# Patient Record
Sex: FEMALE | Race: WHITE | NOT HISPANIC OR LATINO | Employment: OTHER | URBAN - METROPOLITAN AREA
[De-identification: names, ages, dates, MRNs, and addresses within clinical notes are randomized per-mention and may not be internally consistent; named-entity substitution may affect disease eponyms.]

---

## 2017-02-16 ENCOUNTER — ALLSCRIPTS OFFICE VISIT (OUTPATIENT)
Dept: OTHER | Facility: OTHER | Age: 42
End: 2017-02-16

## 2017-03-25 ENCOUNTER — HOSPITAL ENCOUNTER (OUTPATIENT)
Facility: HOSPITAL | Age: 42
Setting detail: OBSERVATION
Discharge: HOME | End: 2017-03-27
Attending: EMERGENCY MEDICINE | Admitting: HOSPITALIST
Payer: COMMERCIAL

## 2017-03-25 ENCOUNTER — APPOINTMENT (EMERGENCY)
Dept: CT IMAGING | Facility: HOSPITAL | Age: 42
End: 2017-03-25
Payer: COMMERCIAL

## 2017-03-25 DIAGNOSIS — T48.202A: ICD-10-CM

## 2017-03-25 DIAGNOSIS — R45.850 HOMICIDAL THOUGHTS: ICD-10-CM

## 2017-03-25 DIAGNOSIS — T50.902A SUICIDAL OVERDOSE, INITIAL ENCOUNTER (HCC): ICD-10-CM

## 2017-03-25 DIAGNOSIS — F41.9 ANXIETY: ICD-10-CM

## 2017-03-25 DIAGNOSIS — R45.851 SUICIDAL THOUGHTS: Primary | ICD-10-CM

## 2017-03-25 PROBLEM — S92.309A CLOSED FRACTURE OF TARSAL AND METATARSAL BONES: Status: ACTIVE | Noted: 2017-03-25

## 2017-03-25 PROBLEM — S92.209A CLOSED FRACTURE OF TARSAL AND METATARSAL BONES: Status: ACTIVE | Noted: 2017-03-25

## 2017-03-25 PROBLEM — G40.909 EPILEPSY (HCC): Chronic | Status: ACTIVE | Noted: 2017-03-25

## 2017-03-25 PROBLEM — R40.0 SOMNOLENCE: Status: ACTIVE | Noted: 2017-03-25

## 2017-03-25 PROBLEM — G89.4 CHRONIC PAIN ASSOCIATED WITH SIGNIFICANT PSYCHOSOCIAL DYSFUNCTION: Status: ACTIVE | Noted: 2017-03-25

## 2017-03-25 PROBLEM — Z09 TREATMENT OF HEALED FRACTURE FOLLOW-UP EXAMINATION: Status: ACTIVE | Noted: 2017-03-25

## 2017-03-25 PROBLEM — T48.201A OVERDOSE OF MUSCLE RELAXANT: Status: ACTIVE | Noted: 2017-03-25

## 2017-03-25 PROBLEM — Z86.711 HISTORY OF PULMONARY EMBOLISM: Status: ACTIVE | Noted: 2017-03-25

## 2017-03-25 PROBLEM — S92.919A CLOSED FRACTURE OF PHALANX OF FOOT: Status: ACTIVE | Noted: 2017-03-25

## 2017-03-25 PROBLEM — S22.49XA CLOSED FRACTURE OF MULTIPLE RIBS: Status: ACTIVE | Noted: 2017-03-25

## 2017-03-25 PROBLEM — F17.200 CURRENT SMOKER: Status: ACTIVE | Noted: 2017-03-25

## 2017-03-25 PROBLEM — G57.70 CAUSALGIA OF LOWER LIMB: Status: ACTIVE | Noted: 2017-03-25

## 2017-03-25 PROBLEM — G40.909 EPILEPSY (HCC): Status: ACTIVE | Noted: 2017-03-25

## 2017-03-25 LAB
ALBUMIN SERPL BCP-MCNC: 3.3 G/DL (ref 3.5–5)
ALP SERPL-CCNC: 112 U/L (ref 46–116)
ALT SERPL W P-5'-P-CCNC: 15 U/L (ref 12–78)
AMPHETAMINES SERPL QL SCN: NEGATIVE
ANION GAP SERPL CALCULATED.3IONS-SCNC: 12 MMOL/L (ref 4–13)
APAP SERPL-MCNC: <2 UG/ML (ref 10–30)
AST SERPL W P-5'-P-CCNC: 9 U/L (ref 5–45)
BARBITURATES UR QL: NEGATIVE
BASOPHILS # BLD AUTO: 0.04 THOUSANDS/ΜL (ref 0–0.1)
BASOPHILS NFR BLD AUTO: 1 % (ref 0–1)
BENZODIAZ UR QL: NEGATIVE
BILIRUB SERPL-MCNC: 0.2 MG/DL (ref 0.2–1)
BILIRUB UR QL STRIP: NEGATIVE
BUN SERPL-MCNC: 10 MG/DL (ref 5–25)
CALCIUM SERPL-MCNC: 8.6 MG/DL (ref 8.3–10.1)
CHLORIDE SERPL-SCNC: 108 MMOL/L (ref 100–108)
CLARITY UR: ABNORMAL
CO2 SERPL-SCNC: 26 MMOL/L (ref 21–32)
COCAINE UR QL: NEGATIVE
COLOR UR: YELLOW
CREAT SERPL-MCNC: 0.91 MG/DL (ref 0.6–1.3)
EOSINOPHIL # BLD AUTO: 0.13 THOUSAND/ΜL (ref 0–0.61)
EOSINOPHIL NFR BLD AUTO: 2 % (ref 0–6)
ERYTHROCYTE [DISTWIDTH] IN BLOOD BY AUTOMATED COUNT: 19.2 % (ref 11.6–15.1)
ETHANOL SERPL-MCNC: <3 MG/DL (ref 0–3)
GFR SERPL CREATININE-BSD FRML MDRD: >60 ML/MIN/1.73SQ M
GLUCOSE SERPL-MCNC: 84 MG/DL (ref 65–140)
GLUCOSE UR STRIP-MCNC: NEGATIVE MG/DL
HCT VFR BLD AUTO: 32.9 % (ref 34.8–46.1)
HGB BLD-MCNC: 9.9 G/DL (ref 11.5–15.4)
HGB UR QL STRIP.AUTO: NEGATIVE
KETONES UR STRIP-MCNC: ABNORMAL MG/DL
LEUKOCYTE ESTERASE UR QL STRIP: NEGATIVE
LYMPHOCYTES # BLD AUTO: 2.67 THOUSANDS/ΜL (ref 0.6–4.47)
LYMPHOCYTES NFR BLD AUTO: 37 % (ref 14–44)
MCH RBC QN AUTO: 21.7 PG (ref 26.8–34.3)
MCHC RBC AUTO-ENTMCNC: 30.1 G/DL (ref 31.4–37.4)
MCV RBC AUTO: 72 FL (ref 82–98)
METHADONE UR QL: NEGATIVE
MONOCYTES # BLD AUTO: 0.51 THOUSAND/ΜL (ref 0.17–1.22)
MONOCYTES NFR BLD AUTO: 7 % (ref 4–12)
NEUTROPHILS # BLD AUTO: 3.84 THOUSANDS/ΜL (ref 1.85–7.62)
NEUTS SEG NFR BLD AUTO: 53 % (ref 43–75)
NITRITE UR QL STRIP: NEGATIVE
OPIATES UR QL SCN: POSITIVE
PCP UR QL: NEGATIVE
PH UR STRIP.AUTO: 6 [PH] (ref 4.5–8)
PLATELET # BLD AUTO: 290 THOUSANDS/UL (ref 149–390)
PMV BLD AUTO: 9.9 FL (ref 8.9–12.7)
POTASSIUM SERPL-SCNC: 3.4 MMOL/L (ref 3.5–5.3)
PROT SERPL-MCNC: 6.8 G/DL (ref 6.4–8.2)
PROT UR STRIP-MCNC: NEGATIVE MG/DL
RBC # BLD AUTO: 4.57 MILLION/UL (ref 3.81–5.12)
SALICYLATES SERPL-MCNC: 4.3 MG/DL (ref 3–20)
SODIUM SERPL-SCNC: 146 MMOL/L (ref 136–145)
SP GR UR STRIP.AUTO: 1.02 (ref 1–1.03)
THC UR QL: NEGATIVE
UROBILINOGEN UR QL STRIP.AUTO: 0.2 E.U./DL
WBC # BLD AUTO: 7.19 THOUSAND/UL (ref 4.31–10.16)

## 2017-03-25 PROCEDURE — 80053 COMPREHEN METABOLIC PANEL: CPT | Performed by: PHYSICIAN ASSISTANT

## 2017-03-25 PROCEDURE — 80307 DRUG TEST PRSMV CHEM ANLYZR: CPT | Performed by: PHYSICIAN ASSISTANT

## 2017-03-25 PROCEDURE — 80320 DRUG SCREEN QUANTALCOHOLS: CPT | Performed by: PHYSICIAN ASSISTANT

## 2017-03-25 PROCEDURE — 36415 COLL VENOUS BLD VENIPUNCTURE: CPT | Performed by: PHYSICIAN ASSISTANT

## 2017-03-25 PROCEDURE — 81003 URINALYSIS AUTO W/O SCOPE: CPT | Performed by: EMERGENCY MEDICINE

## 2017-03-25 PROCEDURE — 80329 ANALGESICS NON-OPIOID 1 OR 2: CPT | Performed by: PHYSICIAN ASSISTANT

## 2017-03-25 PROCEDURE — 96361 HYDRATE IV INFUSION ADD-ON: CPT

## 2017-03-25 PROCEDURE — 70450 CT HEAD/BRAIN W/O DYE: CPT

## 2017-03-25 PROCEDURE — 96374 THER/PROPH/DIAG INJ IV PUSH: CPT

## 2017-03-25 PROCEDURE — 85025 COMPLETE CBC W/AUTO DIFF WBC: CPT | Performed by: PHYSICIAN ASSISTANT

## 2017-03-25 PROCEDURE — 99285 EMERGENCY DEPT VISIT HI MDM: CPT

## 2017-03-25 PROCEDURE — 93005 ELECTROCARDIOGRAM TRACING: CPT | Performed by: PHYSICIAN ASSISTANT

## 2017-03-25 RX ORDER — LORAZEPAM 2 MG/ML
INJECTION INTRAMUSCULAR
Status: COMPLETED
Start: 2017-03-25 | End: 2017-03-25

## 2017-03-25 RX ORDER — CARISOPRODOL 350 MG/1
350 TABLET ORAL 4 TIMES DAILY PRN
COMMUNITY
End: 2017-03-27 | Stop reason: HOSPADM

## 2017-03-25 RX ORDER — LORAZEPAM 2 MG/ML
2 INJECTION INTRAMUSCULAR ONCE
Status: COMPLETED | OUTPATIENT
Start: 2017-03-25 | End: 2017-03-25

## 2017-03-25 RX ADMIN — SODIUM CHLORIDE 500 ML: 0.9 INJECTION, SOLUTION INTRAVENOUS at 16:07

## 2017-03-25 RX ADMIN — LORAZEPAM 2 MG: 2 INJECTION, SOLUTION INTRAMUSCULAR; INTRAVENOUS at 16:27

## 2017-03-25 RX ADMIN — LORAZEPAM 2 MG: 2 INJECTION INTRAMUSCULAR at 16:27

## 2017-03-25 RX ADMIN — SODIUM CHLORIDE 500 ML: 0.9 INJECTION, SOLUTION INTRAVENOUS at 18:49

## 2017-03-26 ENCOUNTER — APPOINTMENT (OUTPATIENT)
Dept: RADIOLOGY | Facility: HOSPITAL | Age: 42
End: 2017-03-26
Payer: COMMERCIAL

## 2017-03-26 LAB
ALBUMIN SERPL BCP-MCNC: 2.4 G/DL (ref 3.5–5)
ALP SERPL-CCNC: 90 U/L (ref 46–116)
ALT SERPL W P-5'-P-CCNC: 12 U/L (ref 12–78)
AMMONIA PLAS-SCNC: 13 UMOL/L (ref 11–35)
ANION GAP SERPL CALCULATED.3IONS-SCNC: 10 MMOL/L (ref 4–13)
ANION GAP SERPL CALCULATED.3IONS-SCNC: 8 MMOL/L (ref 4–13)
AST SERPL W P-5'-P-CCNC: 7 U/L (ref 5–45)
BILIRUB SERPL-MCNC: 0.2 MG/DL (ref 0.2–1)
BUN SERPL-MCNC: 8 MG/DL (ref 5–25)
BUN SERPL-MCNC: 9 MG/DL (ref 5–25)
CALCIUM SERPL-MCNC: 7.9 MG/DL (ref 8.3–10.1)
CALCIUM SERPL-MCNC: 8 MG/DL (ref 8.3–10.1)
CHLORIDE SERPL-SCNC: 111 MMOL/L (ref 100–108)
CHLORIDE SERPL-SCNC: 111 MMOL/L (ref 100–108)
CO2 SERPL-SCNC: 24 MMOL/L (ref 21–32)
CO2 SERPL-SCNC: 25 MMOL/L (ref 21–32)
CREAT SERPL-MCNC: 0.72 MG/DL (ref 0.6–1.3)
CREAT SERPL-MCNC: 0.84 MG/DL (ref 0.6–1.3)
ERYTHROCYTE [DISTWIDTH] IN BLOOD BY AUTOMATED COUNT: 19.2 % (ref 11.6–15.1)
FERRITIN SERPL-MCNC: 51 NG/ML (ref 8–388)
GFR SERPL CREATININE-BSD FRML MDRD: >60 ML/MIN/1.73SQ M
GFR SERPL CREATININE-BSD FRML MDRD: >60 ML/MIN/1.73SQ M
GLUCOSE SERPL-MCNC: 86 MG/DL (ref 65–140)
GLUCOSE SERPL-MCNC: 94 MG/DL (ref 65–140)
HCG SERPL QL: NEGATIVE
HCT VFR BLD AUTO: 28.6 % (ref 34.8–46.1)
HCT VFR BLD AUTO: 31 % (ref 34.8–46.1)
HGB BLD-MCNC: 8.7 G/DL (ref 11.5–15.4)
HGB BLD-MCNC: 9 G/DL (ref 11.5–15.4)
IRON SERPL-MCNC: 28 UG/DL (ref 50–170)
MAGNESIUM SERPL-MCNC: 2.3 MG/DL (ref 1.6–2.6)
MCH RBC QN AUTO: 22 PG (ref 26.8–34.3)
MCHC RBC AUTO-ENTMCNC: 30.4 G/DL (ref 31.4–37.4)
MCV RBC AUTO: 72 FL (ref 82–98)
PLATELET # BLD AUTO: 243 THOUSANDS/UL (ref 149–390)
PMV BLD AUTO: 9.7 FL (ref 8.9–12.7)
POTASSIUM SERPL-SCNC: 3.2 MMOL/L (ref 3.5–5.3)
POTASSIUM SERPL-SCNC: 3.4 MMOL/L (ref 3.5–5.3)
PROT SERPL-MCNC: 5.7 G/DL (ref 6.4–8.2)
RBC # BLD AUTO: 3.95 MILLION/UL (ref 3.81–5.12)
RETICS # AUTO: NORMAL 10*3/UL (ref 14097–95744)
RETICS # CALC: 1.01 % (ref 0.37–1.87)
SODIUM SERPL-SCNC: 144 MMOL/L (ref 136–145)
SODIUM SERPL-SCNC: 145 MMOL/L (ref 136–145)
TIBC SERPL-MCNC: 413 UG/DL (ref 250–450)
WBC # BLD AUTO: 5.38 THOUSAND/UL (ref 4.31–10.16)

## 2017-03-26 PROCEDURE — 80048 BASIC METABOLIC PNL TOTAL CA: CPT | Performed by: NURSE PRACTITIONER

## 2017-03-26 PROCEDURE — 80053 COMPREHEN METABOLIC PANEL: CPT | Performed by: NURSE PRACTITIONER

## 2017-03-26 PROCEDURE — 83735 ASSAY OF MAGNESIUM: CPT | Performed by: NURSE PRACTITIONER

## 2017-03-26 PROCEDURE — 71010 HB CHEST X-RAY 1 VIEW FRONTAL (PORTABLE): CPT

## 2017-03-26 PROCEDURE — 84703 CHORIONIC GONADOTROPIN ASSAY: CPT | Performed by: HOSPITALIST

## 2017-03-26 PROCEDURE — 82728 ASSAY OF FERRITIN: CPT | Performed by: HOSPITALIST

## 2017-03-26 PROCEDURE — 85045 AUTOMATED RETICULOCYTE COUNT: CPT | Performed by: HOSPITALIST

## 2017-03-26 PROCEDURE — 93005 ELECTROCARDIOGRAM TRACING: CPT | Performed by: NURSE PRACTITIONER

## 2017-03-26 PROCEDURE — 83550 IRON BINDING TEST: CPT | Performed by: HOSPITALIST

## 2017-03-26 PROCEDURE — 82140 ASSAY OF AMMONIA: CPT | Performed by: HOSPITALIST

## 2017-03-26 PROCEDURE — 85014 HEMATOCRIT: CPT | Performed by: HOSPITALIST

## 2017-03-26 PROCEDURE — 85027 COMPLETE CBC AUTOMATED: CPT | Performed by: NURSE PRACTITIONER

## 2017-03-26 PROCEDURE — 85018 HEMOGLOBIN: CPT | Performed by: HOSPITALIST

## 2017-03-26 PROCEDURE — 83540 ASSAY OF IRON: CPT | Performed by: HOSPITALIST

## 2017-03-26 RX ORDER — POTASSIUM CHLORIDE 14.9 MG/ML
20 INJECTION INTRAVENOUS
Status: DISCONTINUED | OUTPATIENT
Start: 2017-03-26 | End: 2017-03-26

## 2017-03-26 RX ORDER — ZOLPIDEM TARTRATE 5 MG/1
5 TABLET ORAL
Status: DISCONTINUED | OUTPATIENT
Start: 2017-03-26 | End: 2017-03-27 | Stop reason: HOSPADM

## 2017-03-26 RX ORDER — OXYCODONE HYDROCHLORIDE AND ACETAMINOPHEN 5; 325 MG/1; MG/1
1 TABLET ORAL EVERY 6 HOURS PRN
COMMUNITY
End: 2017-06-09

## 2017-03-26 RX ORDER — MORPHINE SULFATE 15 MG/1
15 TABLET, FILM COATED, EXTENDED RELEASE ORAL 2 TIMES DAILY
Status: DISCONTINUED | OUTPATIENT
Start: 2017-03-26 | End: 2017-03-26

## 2017-03-26 RX ORDER — MORPHINE SULFATE 15 MG/1
15 TABLET, FILM COATED, EXTENDED RELEASE ORAL EVERY 12 HOURS SCHEDULED
Status: DISCONTINUED | OUTPATIENT
Start: 2017-03-27 | End: 2017-03-27 | Stop reason: HOSPADM

## 2017-03-26 RX ORDER — OXYCODONE HYDROCHLORIDE AND ACETAMINOPHEN 5; 325 MG/1; MG/1
2 TABLET ORAL EVERY 4 HOURS PRN
Status: DISCONTINUED | OUTPATIENT
Start: 2017-03-26 | End: 2017-03-27 | Stop reason: HOSPADM

## 2017-03-26 RX ORDER — BUSPIRONE HYDROCHLORIDE 10 MG/1
10 TABLET ORAL 2 TIMES DAILY
Status: DISCONTINUED | OUTPATIENT
Start: 2017-03-26 | End: 2017-03-27 | Stop reason: HOSPADM

## 2017-03-26 RX ORDER — NICOTINE 21 MG/24HR
1 PATCH, TRANSDERMAL 24 HOURS TRANSDERMAL DAILY
Status: DISCONTINUED | OUTPATIENT
Start: 2017-03-26 | End: 2017-03-27 | Stop reason: HOSPADM

## 2017-03-26 RX ORDER — DULOXETIN HYDROCHLORIDE 30 MG/1
30 CAPSULE, DELAYED RELEASE ORAL DAILY
Status: DISCONTINUED | OUTPATIENT
Start: 2017-03-26 | End: 2017-03-27

## 2017-03-26 RX ORDER — TEMAZEPAM 15 MG/1
30 CAPSULE ORAL
Status: DISCONTINUED | OUTPATIENT
Start: 2017-03-26 | End: 2017-03-27 | Stop reason: HOSPADM

## 2017-03-26 RX ORDER — ACETAMINOPHEN 325 MG/1
650 TABLET ORAL EVERY 6 HOURS PRN
Status: DISCONTINUED | OUTPATIENT
Start: 2017-03-26 | End: 2017-03-27 | Stop reason: HOSPADM

## 2017-03-26 RX ORDER — HALOPERIDOL 5 MG/ML
5 INJECTION INTRAMUSCULAR ONCE
Status: COMPLETED | OUTPATIENT
Start: 2017-03-26 | End: 2017-03-26

## 2017-03-26 RX ORDER — OXYCODONE HYDROCHLORIDE AND ACETAMINOPHEN 5; 325 MG/1; MG/1
1 TABLET ORAL EVERY 6 HOURS PRN
Status: DISCONTINUED | OUTPATIENT
Start: 2017-03-26 | End: 2017-03-26

## 2017-03-26 RX ORDER — GABAPENTIN 400 MG/1
800 CAPSULE ORAL 3 TIMES DAILY
Status: DISCONTINUED | OUTPATIENT
Start: 2017-03-26 | End: 2017-03-26

## 2017-03-26 RX ORDER — SENNOSIDES 8.6 MG
1 TABLET ORAL DAILY
Status: DISCONTINUED | OUTPATIENT
Start: 2017-03-26 | End: 2017-03-26

## 2017-03-26 RX ORDER — ROPINIROLE 2 MG/1
4 TABLET, FILM COATED ORAL
Status: DISCONTINUED | OUTPATIENT
Start: 2017-03-26 | End: 2017-03-26

## 2017-03-26 RX ORDER — MORPHINE SULFATE 15 MG/1
15 TABLET, FILM COATED, EXTENDED RELEASE ORAL ONCE
Status: COMPLETED | OUTPATIENT
Start: 2017-03-26 | End: 2017-03-26

## 2017-03-26 RX ORDER — ROPINIROLE 2 MG/1
8 TABLET, FILM COATED ORAL
Status: DISCONTINUED | OUTPATIENT
Start: 2017-03-26 | End: 2017-03-27 | Stop reason: HOSPADM

## 2017-03-26 RX ORDER — TEMAZEPAM 15 MG/1
30 CAPSULE ORAL
Status: DISCONTINUED | OUTPATIENT
Start: 2017-03-26 | End: 2017-03-26

## 2017-03-26 RX ORDER — NALOXONE HYDROCHLORIDE 1 MG/ML
0.4 INJECTION INTRAMUSCULAR; INTRAVENOUS; SUBCUTANEOUS ONCE
Status: DISCONTINUED | OUTPATIENT
Start: 2017-03-26 | End: 2017-03-26

## 2017-03-26 RX ORDER — DEXTROSE, SODIUM CHLORIDE, AND POTASSIUM CHLORIDE 5; .9; .15 G/100ML; G/100ML; G/100ML
100 INJECTION INTRAVENOUS CONTINUOUS
Status: DISCONTINUED | OUTPATIENT
Start: 2017-03-26 | End: 2017-03-26

## 2017-03-26 RX ORDER — MORPHINE SULFATE 15 MG/1
15 TABLET ORAL
Status: ON HOLD | COMMUNITY
End: 2017-03-26 | Stop reason: SDUPTHER

## 2017-03-26 RX ORDER — LORAZEPAM 2 MG/ML
2 INJECTION INTRAMUSCULAR EVERY 4 HOURS PRN
Status: DISCONTINUED | OUTPATIENT
Start: 2017-03-26 | End: 2017-03-26

## 2017-03-26 RX ORDER — OXYCODONE HYDROCHLORIDE AND ACETAMINOPHEN 5; 325 MG/1; MG/1
1 TABLET ORAL EVERY 4 HOURS PRN
Status: DISCONTINUED | OUTPATIENT
Start: 2017-03-26 | End: 2017-03-26

## 2017-03-26 RX ORDER — DOCUSATE SODIUM 100 MG/1
100 CAPSULE, LIQUID FILLED ORAL 2 TIMES DAILY
Status: DISCONTINUED | OUTPATIENT
Start: 2017-03-26 | End: 2017-03-26

## 2017-03-26 RX ORDER — POTASSIUM CHLORIDE 20 MEQ/1
40 TABLET, EXTENDED RELEASE ORAL ONCE
Status: COMPLETED | OUTPATIENT
Start: 2017-03-26 | End: 2017-03-26

## 2017-03-26 RX ORDER — HYDROXYZINE HYDROCHLORIDE 25 MG/1
50 TABLET, FILM COATED ORAL
Status: DISCONTINUED | OUTPATIENT
Start: 2017-03-26 | End: 2017-03-27 | Stop reason: HOSPADM

## 2017-03-26 RX ORDER — OXYCODONE HYDROCHLORIDE AND ACETAMINOPHEN 5; 325 MG/1; MG/1
1 TABLET ORAL ONCE
Status: COMPLETED | OUTPATIENT
Start: 2017-03-26 | End: 2017-03-26

## 2017-03-26 RX ORDER — MORPHINE SULFATE 15 MG/1
15 TABLET, FILM COATED, EXTENDED RELEASE ORAL 2 TIMES DAILY
COMMUNITY
End: 2017-06-09

## 2017-03-26 RX ORDER — LEVETIRACETAM 500 MG/1
1 TABLET ORAL 2 TIMES DAILY
COMMUNITY
Start: 2016-09-30 | End: 2017-06-09

## 2017-03-26 RX ORDER — GABAPENTIN 300 MG/1
900 CAPSULE ORAL 3 TIMES DAILY
Status: DISCONTINUED | OUTPATIENT
Start: 2017-03-26 | End: 2017-03-27 | Stop reason: HOSPADM

## 2017-03-26 RX ADMIN — MORPHINE SULFATE 15 MG: 15 TABLET, EXTENDED RELEASE ORAL at 01:17

## 2017-03-26 RX ADMIN — POTASSIUM CHLORIDE 40 MEQ: 1500 TABLET, EXTENDED RELEASE ORAL at 14:58

## 2017-03-26 RX ADMIN — MORPHINE SULFATE 15 MG: 15 TABLET, EXTENDED RELEASE ORAL at 20:23

## 2017-03-26 RX ADMIN — LORAZEPAM 2 MG: 2 INJECTION, SOLUTION INTRAMUSCULAR; INTRAVENOUS at 01:15

## 2017-03-26 RX ADMIN — GABAPENTIN 800 MG: 400 CAPSULE ORAL at 01:17

## 2017-03-26 RX ADMIN — OXYCODONE HYDROCHLORIDE AND ACETAMINOPHEN 1 TABLET: 5; 325 TABLET ORAL at 01:15

## 2017-03-26 RX ADMIN — ENOXAPARIN SODIUM 40 MG: 40 INJECTION SUBCUTANEOUS at 09:16

## 2017-03-26 RX ADMIN — NICOTINE 1 PATCH: 21 PATCH, EXTENDED RELEASE TRANSDERMAL at 09:15

## 2017-03-26 RX ADMIN — HALOPERIDOL LACTATE 5 MG: 5 INJECTION, SOLUTION INTRAMUSCULAR at 02:09

## 2017-03-26 RX ADMIN — OXYCODONE HYDROCHLORIDE AND ACETAMINOPHEN 1 TABLET: 5; 325 TABLET ORAL at 20:23

## 2017-03-27 VITALS
RESPIRATION RATE: 18 BRPM | TEMPERATURE: 97.4 F | OXYGEN SATURATION: 100 % | HEIGHT: 65 IN | HEART RATE: 76 BPM | BODY MASS INDEX: 36.29 KG/M2 | WEIGHT: 217.81 LBS | DIASTOLIC BLOOD PRESSURE: 53 MMHG | SYSTOLIC BLOOD PRESSURE: 101 MMHG

## 2017-03-27 PROBLEM — Z86.711 HISTORY OF PULMONARY EMBOLISM: Status: RESOLVED | Noted: 2017-03-25 | Resolved: 2017-03-27

## 2017-03-27 PROBLEM — R40.0 SOMNOLENCE: Status: RESOLVED | Noted: 2017-03-25 | Resolved: 2017-03-27

## 2017-03-27 PROBLEM — T48.201A OVERDOSE OF MUSCLE RELAXANT: Status: RESOLVED | Noted: 2017-03-25 | Resolved: 2017-03-27

## 2017-03-27 PROBLEM — S92.209A CLOSED FRACTURE OF TARSAL AND METATARSAL BONES: Status: RESOLVED | Noted: 2017-03-25 | Resolved: 2017-03-27

## 2017-03-27 PROBLEM — Z09 TREATMENT OF HEALED FRACTURE FOLLOW-UP EXAMINATION: Status: RESOLVED | Noted: 2017-03-25 | Resolved: 2017-03-27

## 2017-03-27 PROBLEM — S92.309A CLOSED FRACTURE OF TARSAL AND METATARSAL BONES: Status: RESOLVED | Noted: 2017-03-25 | Resolved: 2017-03-27

## 2017-03-27 PROBLEM — D64.9 ANEMIA: Status: ACTIVE | Noted: 2017-03-27

## 2017-03-27 PROBLEM — S92.919A CLOSED FRACTURE OF PHALANX OF FOOT: Status: RESOLVED | Noted: 2017-03-25 | Resolved: 2017-03-27

## 2017-03-27 PROBLEM — S22.49XA CLOSED FRACTURE OF MULTIPLE RIBS: Status: RESOLVED | Noted: 2017-03-25 | Resolved: 2017-03-27

## 2017-03-27 PROBLEM — G57.70 CAUSALGIA OF LOWER LIMB: Status: RESOLVED | Noted: 2017-03-25 | Resolved: 2017-03-27

## 2017-03-27 LAB
ALBUMIN SERPL BCP-MCNC: 2.5 G/DL (ref 3.5–5)
ALP SERPL-CCNC: 100 U/L (ref 46–116)
ALT SERPL W P-5'-P-CCNC: 12 U/L (ref 12–78)
ANION GAP SERPL CALCULATED.3IONS-SCNC: 9 MMOL/L (ref 4–13)
AST SERPL W P-5'-P-CCNC: 10 U/L (ref 5–45)
ATRIAL RATE: 106 BPM
ATRIAL RATE: 67 BPM
BILIRUB SERPL-MCNC: 0.2 MG/DL (ref 0.2–1)
BUN SERPL-MCNC: 10 MG/DL (ref 5–25)
CALCIUM SERPL-MCNC: 8.2 MG/DL (ref 8.3–10.1)
CHLORIDE SERPL-SCNC: 110 MMOL/L (ref 100–108)
CO2 SERPL-SCNC: 26 MMOL/L (ref 21–32)
CREAT SERPL-MCNC: 0.76 MG/DL (ref 0.6–1.3)
ERYTHROCYTE [DISTWIDTH] IN BLOOD BY AUTOMATED COUNT: 19.4 % (ref 11.6–15.1)
GFR SERPL CREATININE-BSD FRML MDRD: >60 ML/MIN/1.73SQ M
GLUCOSE P FAST SERPL-MCNC: 81 MG/DL (ref 65–99)
GLUCOSE SERPL-MCNC: 81 MG/DL (ref 65–140)
HCT VFR BLD AUTO: 30.7 % (ref 34.8–46.1)
HGB BLD-MCNC: 9 G/DL (ref 11.5–15.4)
MCH RBC QN AUTO: 21.4 PG (ref 26.8–34.3)
MCHC RBC AUTO-ENTMCNC: 29.3 G/DL (ref 31.4–37.4)
MCV RBC AUTO: 73 FL (ref 82–98)
P AXIS: 43 DEGREES
P AXIS: 56 DEGREES
PLATELET # BLD AUTO: 241 THOUSANDS/UL (ref 149–390)
PMV BLD AUTO: 10 FL (ref 8.9–12.7)
POTASSIUM SERPL-SCNC: 4 MMOL/L (ref 3.5–5.3)
PR INTERVAL: 148 MS
PR INTERVAL: 166 MS
PROT SERPL-MCNC: 6.1 G/DL (ref 6.4–8.2)
QRS AXIS: 49 DEGREES
QRS AXIS: 65 DEGREES
QRSD INTERVAL: 84 MS
QRSD INTERVAL: 86 MS
QT INTERVAL: 352 MS
QT INTERVAL: 410 MS
QTC INTERVAL: 433 MS
QTC INTERVAL: 467 MS
RBC # BLD AUTO: 4.21 MILLION/UL (ref 3.81–5.12)
SODIUM SERPL-SCNC: 145 MMOL/L (ref 136–145)
T WAVE AXIS: 35 DEGREES
T WAVE AXIS: 58 DEGREES
VENTRICULAR RATE: 106 BPM
VENTRICULAR RATE: 67 BPM
WBC # BLD AUTO: 5.31 THOUSAND/UL (ref 4.31–10.16)

## 2017-03-27 PROCEDURE — 80053 COMPREHEN METABOLIC PANEL: CPT | Performed by: HOSPITALIST

## 2017-03-27 PROCEDURE — 85027 COMPLETE CBC AUTOMATED: CPT | Performed by: HOSPITALIST

## 2017-03-27 RX ORDER — VENLAFAXINE HYDROCHLORIDE 37.5 MG/1
37.5 CAPSULE, EXTENDED RELEASE ORAL DAILY
Qty: 30 CAPSULE | Refills: 0 | Status: SHIPPED | OUTPATIENT
Start: 2017-03-27 | End: 2017-06-09

## 2017-03-27 RX ORDER — VENLAFAXINE HYDROCHLORIDE 37.5 MG/1
37.5 CAPSULE, EXTENDED RELEASE ORAL DAILY
Status: DISCONTINUED | OUTPATIENT
Start: 2017-03-27 | End: 2017-03-27 | Stop reason: HOSPADM

## 2017-03-27 RX ADMIN — OXYCODONE HYDROCHLORIDE AND ACETAMINOPHEN 2 TABLET: 5; 325 TABLET ORAL at 10:04

## 2017-03-27 RX ADMIN — HYDROXYZINE HYDROCHLORIDE 50 MG: 25 TABLET, FILM COATED ORAL at 00:06

## 2017-03-27 RX ADMIN — DULOXETINE HYDROCHLORIDE 30 MG: 30 CAPSULE, DELAYED RELEASE ORAL at 09:51

## 2017-03-27 RX ADMIN — GABAPENTIN 900 MG: 300 CAPSULE ORAL at 09:50

## 2017-03-27 RX ADMIN — ZOLPIDEM TARTRATE 5 MG: 5 TABLET, FILM COATED ORAL at 00:06

## 2017-03-27 RX ADMIN — OXYCODONE HYDROCHLORIDE AND ACETAMINOPHEN 2 TABLET: 5; 325 TABLET ORAL at 00:49

## 2017-03-27 RX ADMIN — DULOXETINE HYDROCHLORIDE 30 MG: 30 CAPSULE, DELAYED RELEASE ORAL at 00:06

## 2017-03-27 RX ADMIN — NICOTINE 1 PATCH: 21 PATCH, EXTENDED RELEASE TRANSDERMAL at 09:52

## 2017-03-27 RX ADMIN — BUSPIRONE HYDROCHLORIDE 10 MG: 10 TABLET ORAL at 09:50

## 2017-03-27 RX ADMIN — RIVAROXABAN 20 MG: 20 TABLET, FILM COATED ORAL at 09:50

## 2017-03-27 RX ADMIN — TEMAZEPAM 30 MG: 15 CAPSULE ORAL at 00:06

## 2017-03-27 RX ADMIN — VENLAFAXINE HYDROCHLORIDE 37.5 MG: 37.5 CAPSULE, EXTENDED RELEASE ORAL at 14:33

## 2017-03-27 RX ADMIN — GABAPENTIN 900 MG: 300 CAPSULE ORAL at 00:06

## 2017-03-27 RX ADMIN — BUSPIRONE HYDROCHLORIDE 10 MG: 10 TABLET ORAL at 01:28

## 2017-03-27 RX ADMIN — MORPHINE SULFATE 15 MG: 15 TABLET, EXTENDED RELEASE ORAL at 09:50

## 2017-04-03 ENCOUNTER — HOSPITAL ENCOUNTER (EMERGENCY)
Facility: HOSPITAL | Age: 42
Discharge: HOME | End: 2017-04-03
Attending: EMERGENCY MEDICINE | Admitting: EMERGENCY MEDICINE
Payer: COMMERCIAL

## 2017-04-03 ENCOUNTER — APPOINTMENT (EMERGENCY)
Dept: CT IMAGING | Facility: HOSPITAL | Age: 42
End: 2017-04-03
Payer: COMMERCIAL

## 2017-04-03 VITALS
SYSTOLIC BLOOD PRESSURE: 118 MMHG | HEIGHT: 65 IN | RESPIRATION RATE: 18 BRPM | BODY MASS INDEX: 33.32 KG/M2 | HEART RATE: 69 BPM | DIASTOLIC BLOOD PRESSURE: 75 MMHG | OXYGEN SATURATION: 100 % | WEIGHT: 200 LBS

## 2017-04-03 DIAGNOSIS — R11.10 VOMITING: ICD-10-CM

## 2017-04-03 DIAGNOSIS — R19.7 DIARRHEA: ICD-10-CM

## 2017-04-03 DIAGNOSIS — R10.9 ABDOMINAL PAIN: Primary | ICD-10-CM

## 2017-04-03 LAB
ALBUMIN SERPL BCP-MCNC: 3.3 G/DL (ref 3.5–5)
ALP SERPL-CCNC: 140 U/L (ref 46–116)
ALT SERPL W P-5'-P-CCNC: 18 U/L (ref 12–78)
ANION GAP SERPL CALCULATED.3IONS-SCNC: 11 MMOL/L (ref 4–13)
APTT PPP: 26 SECONDS (ref 24–36)
AST SERPL W P-5'-P-CCNC: 9 U/L (ref 5–45)
BASOPHILS # BLD AUTO: 0.04 THOUSANDS/ΜL (ref 0–0.1)
BASOPHILS NFR BLD AUTO: 1 % (ref 0–1)
BILIRUB SERPL-MCNC: 0.2 MG/DL (ref 0.2–1)
BUN SERPL-MCNC: 6 MG/DL (ref 5–25)
CALCIUM SERPL-MCNC: 9 MG/DL (ref 8.3–10.1)
CHLORIDE SERPL-SCNC: 105 MMOL/L (ref 100–108)
CO2 SERPL-SCNC: 26 MMOL/L (ref 21–32)
CREAT SERPL-MCNC: 0.84 MG/DL (ref 0.6–1.3)
EOSINOPHIL # BLD AUTO: 0.18 THOUSAND/ΜL (ref 0–0.61)
EOSINOPHIL NFR BLD AUTO: 2 % (ref 0–6)
ERYTHROCYTE [DISTWIDTH] IN BLOOD BY AUTOMATED COUNT: 21 % (ref 11.6–15.1)
GFR SERPL CREATININE-BSD FRML MDRD: >60 ML/MIN/1.73SQ M
GLUCOSE SERPL-MCNC: 105 MG/DL (ref 65–140)
HCT VFR BLD AUTO: 36 % (ref 34.8–46.1)
HGB BLD-MCNC: 10.7 G/DL (ref 11.5–15.4)
INR PPP: 0.97 (ref 0.86–1.16)
LIPASE SERPL-CCNC: 154 U/L (ref 73–393)
LYMPHOCYTES # BLD AUTO: 2.16 THOUSANDS/ΜL (ref 0.6–4.47)
LYMPHOCYTES NFR BLD AUTO: 25 % (ref 14–44)
MCH RBC QN AUTO: 21.7 PG (ref 26.8–34.3)
MCHC RBC AUTO-ENTMCNC: 29.7 G/DL (ref 31.4–37.4)
MCV RBC AUTO: 73 FL (ref 82–98)
MONOCYTES # BLD AUTO: 0.57 THOUSAND/ΜL (ref 0.17–1.22)
MONOCYTES NFR BLD AUTO: 7 % (ref 4–12)
NEUTROPHILS # BLD AUTO: 5.64 THOUSANDS/ΜL (ref 1.85–7.62)
NEUTS SEG NFR BLD AUTO: 65 % (ref 43–75)
PLATELET # BLD AUTO: 366 THOUSANDS/UL (ref 149–390)
PMV BLD AUTO: 10 FL (ref 8.9–12.7)
POTASSIUM SERPL-SCNC: 3.5 MMOL/L (ref 3.5–5.3)
PROT SERPL-MCNC: 7.6 G/DL (ref 6.4–8.2)
PROTHROMBIN TIME: 12.8 SECONDS (ref 12–14.3)
RBC # BLD AUTO: 4.94 MILLION/UL (ref 3.81–5.12)
SODIUM SERPL-SCNC: 142 MMOL/L (ref 136–145)
WBC # BLD AUTO: 8.59 THOUSAND/UL (ref 4.31–10.16)

## 2017-04-03 PROCEDURE — 80053 COMPREHEN METABOLIC PANEL: CPT | Performed by: EMERGENCY MEDICINE

## 2017-04-03 PROCEDURE — 96361 HYDRATE IV INFUSION ADD-ON: CPT

## 2017-04-03 PROCEDURE — 85610 PROTHROMBIN TIME: CPT | Performed by: EMERGENCY MEDICINE

## 2017-04-03 PROCEDURE — 83690 ASSAY OF LIPASE: CPT | Performed by: EMERGENCY MEDICINE

## 2017-04-03 PROCEDURE — 85025 COMPLETE CBC W/AUTO DIFF WBC: CPT | Performed by: EMERGENCY MEDICINE

## 2017-04-03 PROCEDURE — 36415 COLL VENOUS BLD VENIPUNCTURE: CPT | Performed by: EMERGENCY MEDICINE

## 2017-04-03 PROCEDURE — 85730 THROMBOPLASTIN TIME PARTIAL: CPT | Performed by: EMERGENCY MEDICINE

## 2017-04-03 PROCEDURE — 96375 TX/PRO/DX INJ NEW DRUG ADDON: CPT

## 2017-04-03 PROCEDURE — 99284 EMERGENCY DEPT VISIT MOD MDM: CPT

## 2017-04-03 PROCEDURE — 74176 CT ABD & PELVIS W/O CONTRAST: CPT

## 2017-04-03 PROCEDURE — 96374 THER/PROPH/DIAG INJ IV PUSH: CPT

## 2017-04-03 RX ORDER — PROMETHAZINE HYDROCHLORIDE 25 MG/ML
12.5 INJECTION, SOLUTION INTRAMUSCULAR; INTRAVENOUS ONCE
Status: COMPLETED | OUTPATIENT
Start: 2017-04-03 | End: 2017-04-03

## 2017-04-03 RX ORDER — ONDANSETRON 2 MG/ML
4 INJECTION INTRAMUSCULAR; INTRAVENOUS ONCE
Status: COMPLETED | OUTPATIENT
Start: 2017-04-03 | End: 2017-04-03

## 2017-04-03 RX ADMIN — ONDANSETRON 4 MG: 2 INJECTION INTRAMUSCULAR; INTRAVENOUS at 02:37

## 2017-04-03 RX ADMIN — PROMETHAZINE HYDROCHLORIDE 12.5 MG: 25 INJECTION INTRAMUSCULAR; INTRAVENOUS at 03:17

## 2017-04-03 RX ADMIN — SODIUM CHLORIDE 1000 ML: 0.9 INJECTION, SOLUTION INTRAVENOUS at 02:38

## 2017-05-18 ENCOUNTER — ALLSCRIPTS OFFICE VISIT (OUTPATIENT)
Dept: OTHER | Facility: OTHER | Age: 42
End: 2017-05-18

## 2017-06-09 ENCOUNTER — HOSPITAL ENCOUNTER (EMERGENCY)
Facility: HOSPITAL | Age: 42
Discharge: HOME | End: 2017-06-10
Admitting: EMERGENCY MEDICINE
Payer: COMMERCIAL

## 2017-06-09 DIAGNOSIS — K02.9 INFECTED DENTAL CARIES: Primary | ICD-10-CM

## 2017-06-09 DIAGNOSIS — K04.7 INFECTED DENTAL CARIES: Primary | ICD-10-CM

## 2017-06-09 RX ORDER — BACLOFEN 20 MG/1
20 TABLET ORAL 2 TIMES DAILY
COMMUNITY

## 2017-06-09 RX ORDER — BUSPIRONE HYDROCHLORIDE 15 MG/1
15 TABLET ORAL 3 TIMES DAILY
COMMUNITY

## 2017-06-10 VITALS
BODY MASS INDEX: 36.65 KG/M2 | WEIGHT: 220 LBS | RESPIRATION RATE: 20 BRPM | OXYGEN SATURATION: 99 % | TEMPERATURE: 98.9 F | SYSTOLIC BLOOD PRESSURE: 107 MMHG | HEIGHT: 65 IN | HEART RATE: 66 BPM | DIASTOLIC BLOOD PRESSURE: 58 MMHG

## 2017-06-10 PROBLEM — K02.9 INFECTED DENTAL CARIES: Status: ACTIVE | Noted: 2017-06-10

## 2017-06-10 PROBLEM — K04.7 INFECTED DENTAL CARIES: Status: ACTIVE | Noted: 2017-06-10

## 2017-06-10 PROCEDURE — 99282 EMERGENCY DEPT VISIT SF MDM: CPT

## 2017-06-10 RX ORDER — PENICILLIN V POTASSIUM 500 MG/1
500 TABLET ORAL 4 TIMES DAILY
Qty: 40 TABLET | Refills: 0 | Status: SHIPPED | OUTPATIENT
Start: 2017-06-10 | End: 2017-06-15

## 2017-06-10 RX ORDER — OXYCODONE HYDROCHLORIDE AND ACETAMINOPHEN 5; 325 MG/1; MG/1
1 TABLET ORAL ONCE
Status: COMPLETED | OUTPATIENT
Start: 2017-06-10 | End: 2017-06-10

## 2017-06-10 RX ORDER — OXYCODONE HYDROCHLORIDE AND ACETAMINOPHEN 5; 325 MG/1; MG/1
1 TABLET ORAL EVERY 4 HOURS PRN
Qty: 12 TABLET | Refills: 0 | Status: SHIPPED | OUTPATIENT
Start: 2017-06-10 | End: 2017-06-15

## 2017-06-10 RX ORDER — PENICILLIN V POTASSIUM 250 MG/1
500 TABLET ORAL ONCE
Status: COMPLETED | OUTPATIENT
Start: 2017-06-10 | End: 2017-06-10

## 2017-06-10 RX ADMIN — PENICILLIN V POTASIUM 500 MG: 250 TABLET ORAL at 00:40

## 2017-06-10 RX ADMIN — OXYCODONE HYDROCHLORIDE AND ACETAMINOPHEN 1 TABLET: 5; 325 TABLET ORAL at 00:39

## 2017-06-15 ENCOUNTER — HOSPITAL ENCOUNTER (EMERGENCY)
Facility: HOSPITAL | Age: 42
Discharge: HOME | End: 2017-06-15
Admitting: EMERGENCY MEDICINE
Payer: COMMERCIAL

## 2017-06-15 VITALS
WEIGHT: 217 LBS | TEMPERATURE: 98.6 F | HEIGHT: 65 IN | HEART RATE: 67 BPM | BODY MASS INDEX: 36.15 KG/M2 | DIASTOLIC BLOOD PRESSURE: 66 MMHG | SYSTOLIC BLOOD PRESSURE: 115 MMHG | RESPIRATION RATE: 20 BRPM | OXYGEN SATURATION: 99 %

## 2017-06-15 DIAGNOSIS — K08.89 PAIN, DENTAL: Primary | ICD-10-CM

## 2017-06-15 PROCEDURE — 99283 EMERGENCY DEPT VISIT LOW MDM: CPT

## 2017-06-15 RX ORDER — MIRTAZAPINE 30 MG/1
8 TABLET, FILM COATED ORAL
COMMUNITY
End: 2018-05-29 | Stop reason: ALTCHOICE

## 2017-06-15 RX ORDER — HYDROCODONE BITARTRATE AND ACETAMINOPHEN 5; 325 MG/1; MG/1
1 TABLET ORAL ONCE
Status: COMPLETED | OUTPATIENT
Start: 2017-06-15 | End: 2017-06-15

## 2017-06-15 RX ADMIN — HYDROCODONE BITARTRATE AND ACETAMINOPHEN 1 TABLET: 5; 325 TABLET ORAL at 20:07

## 2018-01-14 VITALS
SYSTOLIC BLOOD PRESSURE: 138 MMHG | HEART RATE: 88 BPM | RESPIRATION RATE: 16 BRPM | WEIGHT: 223.13 LBS | OXYGEN SATURATION: 98 % | BODY MASS INDEX: 37.18 KG/M2 | HEIGHT: 65 IN | DIASTOLIC BLOOD PRESSURE: 80 MMHG

## 2018-01-15 VITALS
SYSTOLIC BLOOD PRESSURE: 128 MMHG | HEART RATE: 88 BPM | WEIGHT: 216 LBS | DIASTOLIC BLOOD PRESSURE: 68 MMHG | RESPIRATION RATE: 16 BRPM | BODY MASS INDEX: 35.99 KG/M2 | HEIGHT: 65 IN

## 2018-01-15 NOTE — RESULT NOTES
Verified Results  * MRI BRAIN SEIZURE WO AND W CONTRAST [id number] 11:42AM Shan Ivan Order Number: [id number]   Performing Comments: 48 yo woman with history of epilepsy and prior CT scan reportedly showing a right basal ganglia hypodenisty  Please do MRI brain to evaluate for source of seizure or prior infarction    - Patient Instructions: To schedule this    appointment, please contact Central Scheduling at [phone]  Test Name Result Flag Reference   MRI BRAIN SEIZURE WO AND W CONTRAST (Report)     This is a summary report  The complete report is available in the patient's medical record  If you cannot access the medical record, please contact the sending organization for a detailed fax or copy  MRI BRAIN - WITH AND WITHOUT CONTRAST     INDICATION: Seizure disorder  COMPARISON: CT 8/4/2016     TECHNIQUE: Sagittal 3D SPGR, axial T2, axial FLAIR, axial T1, axial Marion, coronal T2 and FLAIR  Post-contrast axial T1 , Sagittal 3D SPGR with coronal recons  11 mL of Gadavist was injected intravenously without immediate consequence  IMAGE QUALITY:  Intermittent motion related artifact  FINDINGS:     BRAIN PARENCHYMA: There is no discrete mass, mass effect or midline shift  No abnormal white matter signal identified  Brainstem and cerebellum demonstrate normal signal  Diffusion imaging is unremarkable  Symmetric hippocampal formations with regard to size and signal       Postcontrast imaging of the brain demonstrates no abnormal enhancement  VENTRICLES: Normal      SELLA AND PITUITARY GLAND: Normal      ORBITS: Normal      PARANASAL SINUSES: Normal      VASCULATURE: Evaluation of the major intracranial vasculature demonstrates appropriate flow voids  CALVARIUM AND SKULL BASE: Normal      EXTRACRANIAL SOFT TISSUES: Normal        IMPRESSION:     Normal enhanced MR of the brain, with special attention to the temporal lobes  No temporal lobe pathology       Intermittent motion related artifact  Workstation performed: [id number]     Signed by:    Abel Figueroa MD   9/22/16

## 2018-03-06 ENCOUNTER — APPOINTMENT (INPATIENT)
Dept: RADIOLOGY | Facility: HOSPITAL | Age: 43
DRG: 134 | End: 2018-03-06
Payer: COMMERCIAL

## 2018-03-06 ENCOUNTER — HOSPITAL ENCOUNTER (INPATIENT)
Facility: HOSPITAL | Age: 43
LOS: 4 days | Discharge: LEFT BEFORE DISCHARGE | DRG: 134 | End: 2018-03-10
Attending: EMERGENCY MEDICINE | Admitting: HOSPITALIST
Payer: COMMERCIAL

## 2018-03-06 ENCOUNTER — APPOINTMENT (EMERGENCY)
Dept: CT IMAGING | Facility: HOSPITAL | Age: 43
DRG: 134 | End: 2018-03-06
Payer: COMMERCIAL

## 2018-03-06 ENCOUNTER — APPOINTMENT (EMERGENCY)
Dept: RADIOLOGY | Facility: HOSPITAL | Age: 43
DRG: 134 | End: 2018-03-06
Payer: COMMERCIAL

## 2018-03-06 ENCOUNTER — APPOINTMENT (INPATIENT)
Dept: NON INVASIVE DIAGNOSTICS | Facility: HOSPITAL | Age: 43
DRG: 134 | End: 2018-03-06
Payer: COMMERCIAL

## 2018-03-06 DIAGNOSIS — F41.9 ANXIETY: ICD-10-CM

## 2018-03-06 DIAGNOSIS — I26.99 PULMONARY EMBOLISM (HCC): Primary | ICD-10-CM

## 2018-03-06 DIAGNOSIS — J44.1 CHRONIC OBSTRUCTIVE PULMONARY DISEASE WITH ACUTE EXACERBATION (HCC): ICD-10-CM

## 2018-03-06 DIAGNOSIS — I26.99 OTHER ACUTE PULMONARY EMBOLISM WITHOUT ACUTE COR PULMONALE (HCC): ICD-10-CM

## 2018-03-06 PROBLEM — J96.01 ACUTE RESPIRATORY FAILURE WITH HYPOXIA (HCC): Status: ACTIVE | Noted: 2018-03-06

## 2018-03-06 LAB
ALBUMIN SERPL BCP-MCNC: 2.5 G/DL (ref 3.5–5)
ALP SERPL-CCNC: 137 U/L (ref 46–116)
ALT SERPL W P-5'-P-CCNC: 19 U/L (ref 12–78)
ANION GAP SERPL CALCULATED.3IONS-SCNC: 13 MMOL/L (ref 4–13)
APTT PPP: 125 SECONDS (ref 23–35)
APTT PPP: 28 SECONDS (ref 23–35)
APTT PPP: 33 SECONDS (ref 23–35)
APTT PPP: 55 SECONDS (ref 23–35)
AST SERPL W P-5'-P-CCNC: 18 U/L (ref 5–45)
ATRIAL RATE: 109 BPM
BASOPHILS # BLD AUTO: 0.04 THOUSANDS/ΜL (ref 0–0.1)
BASOPHILS NFR BLD AUTO: 0 % (ref 0–1)
BILIRUB SERPL-MCNC: 0.4 MG/DL (ref 0.2–1)
BUN SERPL-MCNC: 9 MG/DL (ref 5–25)
CALCIUM SERPL-MCNC: 7.7 MG/DL (ref 8.3–10.1)
CHLORIDE SERPL-SCNC: 101 MMOL/L (ref 100–108)
CO2 SERPL-SCNC: 24 MMOL/L (ref 21–32)
CREAT SERPL-MCNC: 0.76 MG/DL (ref 0.6–1.3)
EOSINOPHIL # BLD AUTO: 0.13 THOUSAND/ΜL (ref 0–0.61)
EOSINOPHIL NFR BLD AUTO: 1 % (ref 0–6)
ERYTHROCYTE [DISTWIDTH] IN BLOOD BY AUTOMATED COUNT: 18.8 % (ref 11.6–15.1)
GFR SERPL CREATININE-BSD FRML MDRD: 98 ML/MIN/1.73SQ M
GLUCOSE SERPL-MCNC: 102 MG/DL (ref 65–140)
HCT VFR BLD AUTO: 33.6 % (ref 34.8–46.1)
HGB BLD-MCNC: 10.3 G/DL (ref 11.5–15.4)
INR PPP: 1.04 (ref 0.86–1.16)
LYMPHOCYTES # BLD AUTO: 3.06 THOUSANDS/ΜL (ref 0.6–4.47)
LYMPHOCYTES NFR BLD AUTO: 15 % (ref 14–44)
MCH RBC QN AUTO: 22.5 PG (ref 26.8–34.3)
MCHC RBC AUTO-ENTMCNC: 30.7 G/DL (ref 31.4–37.4)
MCV RBC AUTO: 74 FL (ref 82–98)
MONOCYTES # BLD AUTO: 1.18 THOUSAND/ΜL (ref 0.17–1.22)
MONOCYTES NFR BLD AUTO: 6 % (ref 4–12)
NEUTROPHILS # BLD AUTO: 16.39 THOUSANDS/ΜL (ref 1.85–7.62)
NEUTS SEG NFR BLD AUTO: 78 % (ref 43–75)
P AXIS: 61 DEGREES
PLATELET # BLD AUTO: 341 THOUSANDS/UL (ref 149–390)
PMV BLD AUTO: 9.3 FL (ref 8.9–12.7)
POTASSIUM SERPL-SCNC: 3.6 MMOL/L (ref 3.5–5.3)
PR INTERVAL: 126 MS
PROT SERPL-MCNC: 6.6 G/DL (ref 6.4–8.2)
PROTHROMBIN TIME: 13.4 SECONDS (ref 12.1–14.4)
QRS AXIS: 44 DEGREES
QRSD INTERVAL: 82 MS
QT INTERVAL: 310 MS
QTC INTERVAL: 417 MS
RBC # BLD AUTO: 4.57 MILLION/UL (ref 3.81–5.12)
SODIUM SERPL-SCNC: 138 MMOL/L (ref 136–145)
T WAVE AXIS: 59 DEGREES
TROPONIN I SERPL-MCNC: <0.02 NG/ML
VENTRICULAR RATE: 109 BPM
WBC # BLD AUTO: 20.8 THOUSAND/UL (ref 4.31–10.16)

## 2018-03-06 PROCEDURE — 99285 EMERGENCY DEPT VISIT HI MDM: CPT

## 2018-03-06 PROCEDURE — 36415 COLL VENOUS BLD VENIPUNCTURE: CPT | Performed by: EMERGENCY MEDICINE

## 2018-03-06 PROCEDURE — 85730 THROMBOPLASTIN TIME PARTIAL: CPT | Performed by: EMERGENCY MEDICINE

## 2018-03-06 PROCEDURE — 93005 ELECTROCARDIOGRAM TRACING: CPT | Performed by: EMERGENCY MEDICINE

## 2018-03-06 PROCEDURE — 93306 TTE W/DOPPLER COMPLETE: CPT | Performed by: INTERNAL MEDICINE

## 2018-03-06 PROCEDURE — 85025 COMPLETE CBC W/AUTO DIFF WBC: CPT | Performed by: EMERGENCY MEDICINE

## 2018-03-06 PROCEDURE — 84484 ASSAY OF TROPONIN QUANT: CPT | Performed by: EMERGENCY MEDICINE

## 2018-03-06 PROCEDURE — 94660 CPAP INITIATION&MGMT: CPT

## 2018-03-06 PROCEDURE — 85730 THROMBOPLASTIN TIME PARTIAL: CPT | Performed by: HOSPITALIST

## 2018-03-06 PROCEDURE — 94640 AIRWAY INHALATION TREATMENT: CPT

## 2018-03-06 PROCEDURE — 99255 IP/OBS CONSLTJ NEW/EST HI 80: CPT | Performed by: INTERNAL MEDICINE

## 2018-03-06 PROCEDURE — 80053 COMPREHEN METABOLIC PANEL: CPT | Performed by: EMERGENCY MEDICINE

## 2018-03-06 PROCEDURE — 99223 1ST HOSP IP/OBS HIGH 75: CPT | Performed by: HOSPITALIST

## 2018-03-06 PROCEDURE — 87798 DETECT AGENT NOS DNA AMP: CPT | Performed by: INTERNAL MEDICINE

## 2018-03-06 PROCEDURE — 96374 THER/PROPH/DIAG INJ IV PUSH: CPT

## 2018-03-06 PROCEDURE — 93306 TTE W/DOPPLER COMPLETE: CPT

## 2018-03-06 PROCEDURE — 96375 TX/PRO/DX INJ NEW DRUG ADDON: CPT

## 2018-03-06 PROCEDURE — 71045 X-RAY EXAM CHEST 1 VIEW: CPT

## 2018-03-06 PROCEDURE — 94760 N-INVAS EAR/PLS OXIMETRY 1: CPT

## 2018-03-06 PROCEDURE — 85610 PROTHROMBIN TIME: CPT | Performed by: EMERGENCY MEDICINE

## 2018-03-06 PROCEDURE — 71275 CT ANGIOGRAPHY CHEST: CPT

## 2018-03-06 RX ORDER — QUETIAPINE FUMARATE 25 MG/1
50 TABLET, FILM COATED ORAL
Status: DISCONTINUED | OUTPATIENT
Start: 2018-03-06 | End: 2018-03-10 | Stop reason: HOSPADM

## 2018-03-06 RX ORDER — HEPARIN SODIUM 1000 [USP'U]/ML
7200 INJECTION, SOLUTION INTRAVENOUS; SUBCUTANEOUS AS NEEDED
Status: DISCONTINUED | OUTPATIENT
Start: 2018-03-06 | End: 2018-03-07

## 2018-03-06 RX ORDER — ALBUTEROL SULFATE 2.5 MG/3ML
2.5 SOLUTION RESPIRATORY (INHALATION) EVERY 4 HOURS PRN
Status: DISCONTINUED | OUTPATIENT
Start: 2018-03-06 | End: 2018-03-10 | Stop reason: HOSPADM

## 2018-03-06 RX ORDER — HEPARIN SODIUM 1000 [USP'U]/ML
7200 INJECTION, SOLUTION INTRAVENOUS; SUBCUTANEOUS ONCE
Status: COMPLETED | OUTPATIENT
Start: 2018-03-06 | End: 2018-03-06

## 2018-03-06 RX ORDER — BACLOFEN 10 MG/1
20 TABLET ORAL 2 TIMES DAILY
Status: DISCONTINUED | OUTPATIENT
Start: 2018-03-06 | End: 2018-03-10 | Stop reason: HOSPADM

## 2018-03-06 RX ORDER — HEPARIN SODIUM 1000 [USP'U]/ML
3600 INJECTION, SOLUTION INTRAVENOUS; SUBCUTANEOUS AS NEEDED
Status: DISCONTINUED | OUTPATIENT
Start: 2018-03-06 | End: 2018-03-07

## 2018-03-06 RX ORDER — PANTOPRAZOLE SODIUM 40 MG/1
40 TABLET, DELAYED RELEASE ORAL DAILY
Status: DISCONTINUED | OUTPATIENT
Start: 2018-03-06 | End: 2018-03-10 | Stop reason: HOSPADM

## 2018-03-06 RX ORDER — DULOXETIN HYDROCHLORIDE 30 MG/1
30 CAPSULE, DELAYED RELEASE ORAL DAILY
Status: DISCONTINUED | OUTPATIENT
Start: 2018-03-06 | End: 2018-03-07

## 2018-03-06 RX ORDER — GABAPENTIN 400 MG/1
800 CAPSULE ORAL 4 TIMES DAILY
Status: DISCONTINUED | OUTPATIENT
Start: 2018-03-06 | End: 2018-03-10 | Stop reason: HOSPADM

## 2018-03-06 RX ORDER — DOCUSATE SODIUM 100 MG/1
100 CAPSULE, LIQUID FILLED ORAL 2 TIMES DAILY
Status: DISCONTINUED | OUTPATIENT
Start: 2018-03-06 | End: 2018-03-10 | Stop reason: HOSPADM

## 2018-03-06 RX ORDER — QUETIAPINE FUMARATE 50 MG/1
50 TABLET, FILM COATED ORAL
COMMUNITY
End: 2018-05-29 | Stop reason: ALTCHOICE

## 2018-03-06 RX ORDER — VENLAFAXINE 37.5 MG/1
150 TABLET ORAL DAILY
Status: DISCONTINUED | OUTPATIENT
Start: 2018-03-06 | End: 2018-03-06 | Stop reason: SDUPTHER

## 2018-03-06 RX ORDER — ROPINIROLE 2 MG/1
8 TABLET, FILM COATED ORAL
Status: DISCONTINUED | OUTPATIENT
Start: 2018-03-06 | End: 2018-03-10 | Stop reason: HOSPADM

## 2018-03-06 RX ORDER — HEPARIN SODIUM 10000 [USP'U]/100ML
3-30 INJECTION, SOLUTION INTRAVENOUS
Status: DISCONTINUED | OUTPATIENT
Start: 2018-03-06 | End: 2018-03-07

## 2018-03-06 RX ORDER — HYDROXYZINE HYDROCHLORIDE 25 MG/1
50 TABLET, FILM COATED ORAL
Status: DISCONTINUED | OUTPATIENT
Start: 2018-03-06 | End: 2018-03-07

## 2018-03-06 RX ORDER — FUROSEMIDE 10 MG/ML
40 INJECTION INTRAMUSCULAR; INTRAVENOUS ONCE
Status: COMPLETED | OUTPATIENT
Start: 2018-03-06 | End: 2018-03-06

## 2018-03-06 RX ORDER — HYDROXYZINE PAMOATE 50 MG/1
1 CAPSULE ORAL 3 TIMES DAILY
COMMUNITY
Start: 2016-09-01

## 2018-03-06 RX ORDER — METHYLPREDNISOLONE SODIUM SUCCINATE 40 MG/ML
40 INJECTION, POWDER, LYOPHILIZED, FOR SOLUTION INTRAMUSCULAR; INTRAVENOUS EVERY 6 HOURS SCHEDULED
Status: DISCONTINUED | OUTPATIENT
Start: 2018-03-06 | End: 2018-03-07

## 2018-03-06 RX ORDER — VENLAFAXINE 37.5 MG/1
37.5 TABLET ORAL DAILY
COMMUNITY
End: 2018-05-29 | Stop reason: ALTCHOICE

## 2018-03-06 RX ORDER — CALCIUM CARBONATE 200(500)MG
1000 TABLET,CHEWABLE ORAL DAILY PRN
Status: DISCONTINUED | OUTPATIENT
Start: 2018-03-06 | End: 2018-03-10 | Stop reason: HOSPADM

## 2018-03-06 RX ORDER — TEMAZEPAM 15 MG/1
30 CAPSULE ORAL
Status: DISCONTINUED | OUTPATIENT
Start: 2018-03-06 | End: 2018-03-10 | Stop reason: HOSPADM

## 2018-03-06 RX ORDER — GABAPENTIN 800 MG/1
800 TABLET ORAL 3 TIMES DAILY
Status: DISCONTINUED | OUTPATIENT
Start: 2018-03-06 | End: 2018-03-06

## 2018-03-06 RX ORDER — MIRTAZAPINE 15 MG/1
30 TABLET, FILM COATED ORAL
Status: DISCONTINUED | OUTPATIENT
Start: 2018-03-06 | End: 2018-03-10 | Stop reason: HOSPADM

## 2018-03-06 RX ORDER — NICOTINE 21 MG/24HR
1 PATCH, TRANSDERMAL 24 HOURS TRANSDERMAL DAILY
Status: DISCONTINUED | OUTPATIENT
Start: 2018-03-06 | End: 2018-03-10 | Stop reason: HOSPADM

## 2018-03-06 RX ORDER — BUSPIRONE HYDROCHLORIDE 15 MG/1
15 TABLET ORAL 3 TIMES DAILY
Status: DISCONTINUED | OUTPATIENT
Start: 2018-03-06 | End: 2018-03-07

## 2018-03-06 RX ORDER — GABAPENTIN 800 MG/1
1 TABLET ORAL 4 TIMES DAILY
COMMUNITY

## 2018-03-06 RX ORDER — LOPERAMIDE HYDROCHLORIDE 2 MG/1
2 CAPSULE ORAL EVERY 4 HOURS PRN
Status: DISCONTINUED | OUTPATIENT
Start: 2018-03-06 | End: 2018-03-10 | Stop reason: HOSPADM

## 2018-03-06 RX ORDER — PANTOPRAZOLE SODIUM 40 MG/1
40 TABLET, DELAYED RELEASE ORAL DAILY
COMMUNITY
End: 2018-05-29 | Stop reason: ALTCHOICE

## 2018-03-06 RX ORDER — VENLAFAXINE HYDROCHLORIDE 150 MG/1
150 CAPSULE, EXTENDED RELEASE ORAL DAILY
Status: DISCONTINUED | OUTPATIENT
Start: 2018-03-06 | End: 2018-03-07

## 2018-03-06 RX ORDER — DULOXETIN HYDROCHLORIDE 30 MG/1
1 CAPSULE, DELAYED RELEASE ORAL DAILY
COMMUNITY
Start: 2014-01-16 | End: 2018-05-29 | Stop reason: ALTCHOICE

## 2018-03-06 RX ORDER — TEMAZEPAM 30 MG/1
30 CAPSULE ORAL
COMMUNITY
End: 2018-05-29 | Stop reason: ALTCHOICE

## 2018-03-06 RX ORDER — SODIUM CHLORIDE FOR INHALATION 0.9 %
3 VIAL, NEBULIZER (ML) INHALATION EVERY 4 HOURS PRN
Status: DISCONTINUED | OUTPATIENT
Start: 2018-03-06 | End: 2018-03-10 | Stop reason: HOSPADM

## 2018-03-06 RX ADMIN — HEPARIN SODIUM 7200 UNITS: 1000 INJECTION, SOLUTION INTRAVENOUS; SUBCUTANEOUS at 08:19

## 2018-03-06 RX ADMIN — LEVOFLOXACIN 750 MG: 500 TABLET, FILM COATED ORAL at 12:58

## 2018-03-06 RX ADMIN — BACLOFEN 20 MG: 10 TABLET ORAL at 17:37

## 2018-03-06 RX ADMIN — ROPINIROLE 8 MG: 2 TABLET, FILM COATED ORAL at 22:23

## 2018-03-06 RX ADMIN — BACLOFEN 20 MG: 10 TABLET ORAL at 12:58

## 2018-03-06 RX ADMIN — METHYLPREDNISOLONE SODIUM SUCCINATE 40 MG: 40 INJECTION, POWDER, FOR SOLUTION INTRAMUSCULAR; INTRAVENOUS at 17:37

## 2018-03-06 RX ADMIN — HEPARIN SODIUM AND DEXTROSE 18 UNITS/KG/HR: 10000; 5 INJECTION INTRAVENOUS at 08:19

## 2018-03-06 RX ADMIN — BUSPIRONE HYDROCHLORIDE 15 MG: 15 TABLET ORAL at 21:48

## 2018-03-06 RX ADMIN — METHYLPREDNISOLONE SODIUM SUCCINATE 40 MG: 40 INJECTION, POWDER, FOR SOLUTION INTRAMUSCULAR; INTRAVENOUS at 12:58

## 2018-03-06 RX ADMIN — GABAPENTIN 800 MG: 400 CAPSULE ORAL at 17:37

## 2018-03-06 RX ADMIN — DOCUSATE SODIUM 100 MG: 100 CAPSULE, LIQUID FILLED ORAL at 17:37

## 2018-03-06 RX ADMIN — PANTOPRAZOLE SODIUM 40 MG: 40 TABLET, DELAYED RELEASE ORAL at 12:58

## 2018-03-06 RX ADMIN — ALBUTEROL SULFATE 2.5 MG: 2.5 SOLUTION RESPIRATORY (INHALATION) at 14:30

## 2018-03-06 RX ADMIN — MIRTAZAPINE 30 MG: 15 TABLET, FILM COATED ORAL at 21:49

## 2018-03-06 RX ADMIN — VENLAFAXINE HYDROCHLORIDE 150 MG: 150 CAPSULE, EXTENDED RELEASE ORAL at 12:59

## 2018-03-06 RX ADMIN — DOCUSATE SODIUM 100 MG: 100 CAPSULE, LIQUID FILLED ORAL at 12:57

## 2018-03-06 RX ADMIN — IOHEXOL 85 ML: 350 INJECTION, SOLUTION INTRAVENOUS at 07:29

## 2018-03-06 RX ADMIN — NICOTINE 1 PATCH: 14 PATCH, EXTENDED RELEASE TRANSDERMAL at 12:58

## 2018-03-06 RX ADMIN — QUETIAPINE FUMARATE 50 MG: 25 TABLET ORAL at 21:49

## 2018-03-06 RX ADMIN — FUROSEMIDE 40 MG: 10 INJECTION, SOLUTION INTRAMUSCULAR; INTRAVENOUS at 15:52

## 2018-03-06 RX ADMIN — GABAPENTIN 800 MG: 400 CAPSULE ORAL at 21:49

## 2018-03-06 RX ADMIN — HYDROXYZINE HYDROCHLORIDE 50 MG: 25 TABLET, FILM COATED ORAL at 21:49

## 2018-03-06 RX ADMIN — DULOXETINE HYDROCHLORIDE 30 MG: 30 CAPSULE, DELAYED RELEASE ORAL at 12:57

## 2018-03-06 RX ADMIN — BUSPIRONE HYDROCHLORIDE 15 MG: 15 TABLET ORAL at 12:57

## 2018-03-06 NOTE — H&P
[address] [facility]    H&P- Precious Diez 1975, 38 y o  female MRN: [id number]    Unit/Bed#: [address] [place] [address] Encounter: [id number]    Primary Care Provider: Evelyne Lopez MD   Date and time admitted to hospital: 3/6/2018  6:10 AM      * Acute pulmonary embolism [facility])   Assessment & Plan    CT:  Acute right lower lobe segmental and subsegmental pulmonary emboli  No evidence of RV strain  Heparin drip  Patient reportedly stopped her NOAC because she did not want to take so many medications  Start Eliquis tomorrow        Acute respiratory failure with hypoxia ([facility] )   Assessment & Plan    Likely secondary to pulmonary embolism and COPD exacerbation  3 L NC O2  Patient complains shortness of breath x2 days, chronic smoker  Patient checked O2 sat at home and was at 72% yesterday on Ra, 85% in ED, now 93% with nasal cannula  Solu-Medrol 40 mg IV q 6h  Levaquin 750 mg p o  Albuterol inhaler p r n  Chronic obstructive pulmonary disease with acute exacerbation [facility])   Assessment & Plan    Patient complains shortness of breath x2 days, chronic smoker  Patient checked O2 sat at home and was at 72% yesterday, 85% in ED, 93% on 3 L nasal cannula  Solu-Medrol 40 mg IV q 6h  Levaquin 750 mg p o  Albuterol inhaler p r n  Current smoker   Assessment & Plan    Smoking cessation discussed  Dictator placement therapy        Epilepsy ([facility] )   Assessment & Plan    No seizure activity noted, continue home meds        Anxiety   Assessment & Plan    Continue home meds          VTE Prophylaxis: Heparin Drip  / sequential compression device   Code Status:  Full  POLST: There is no POLST form on file for this patient (pre-hospital)  Discussion with family:   at bedside    Anticipated Length of Stay:  Patient will be admitted on an Inpatient basis with an anticipated length of stay of  greater than 2 midnights     Justification for Hospital Stay:  Acute pulmonary embolism, acute respiratory failure with hypoxia, COPD with acute exacerbation    Chief Complaint:   Shortness of breath x2 days    History of Present Illness:    Ana Church is a 38 y o  female the past medical history for the pulmonary embolism, smoking who presents with shortness of breath x2 days  Patient was reportedly short of breath yesterday while walking on the sidewalk  She reportedly checked her O2 sat at home with her daughters pulse oximeter and was found to be 72% on room air  She then proceeded to take a a 16 hour car ride home from [place] to [place]  Patient reportedly went to the ED in [place] due to lower extremity swelling, however she stated that she would not receive any treatment at that time  While in the ED she was found to be hypoxic with an O2 sat of 76% on room air  A nasal cannula was placed to 3 L which brought her O2 sat up to 93%  A CT scan showed an acute right lower lobe segmental and subsegmental pulmonary emboli  The patient was reportedly on Xarelto and she stopped it on her own accord last year because she did want to be taking so many medications      Summary of recommendations:    PE:  Heparin drip started in the ED, switch to Eliquis tomorrow  COPD exacerbation:  3 Medrol 40 mg IV q 6, Levaquin 750 mg p o , 3 L nasal cannula O2      Review of Systems:    Review of Systems   Constitutional: Negative for activity change, chills, diaphoresis, fatigue and fever  HENT: Negative for congestion, drooling, mouth sores, nosebleeds, sore throat and trouble swallowing  Eyes: Negative for photophobia and visual disturbance  Respiratory: Positive for cough, shortness of breath and wheezing  Negative for apnea, choking, chest tightness and stridor  Cardiovascular: Positive for leg swelling  Negative for chest pain and palpitations  Gastrointestinal: Negative for abdominal distention, abdominal pain, anal bleeding, constipation, diarrhea, nausea and vomiting     Endocrine: Negative for polydipsia and polyphagia  Genitourinary: Negative for dysuria, flank pain and hematuria  Musculoskeletal: Negative for back pain, gait problem and joint swelling  Skin: Negative for color change, pallor, rash and wound  Neurological: Positive for headaches  Negative for dizziness, tremors, seizures, syncope, facial asymmetry, weakness, light-headedness and numbness  All other systems reviewed and are negative  Past Medical and Surgical History:     Past Medical History:   Diagnosis Date    Arthritis 10/23/2015    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    Chronic pain     Closed dislocation of tarsometatarsal joint 2/3/2015    Closed fracture of multiple ribs 3/25/2017    Closed fracture of phalanx of foot 3/25/2017    Closed fracture of tarsal and metatarsal bones 3/25/2017    Complex regional pain syndrome type 1 of lower extremity 4/12/2016    Depression     History of pulmonary embolism 3/25/2017    Iron deficiency anemia     Pulmonary embolism (HCC)     Seizures ([facility] )     Treatment of healed fracture follow-up examination 3/25/2017       Past Surgical History:   Procedure Laterality Date    FOOT SURGERY      GASTRECTOMY  2006    GASTRIC BYPASS      HERNIA REPAIR      HYSTERECTOMY         Meds/Allergies:    Prior to Admission medications    Medication Sig Start Date End Date Taking?  Authorizing Provider   DULoxetine (CYMBALTA) 30 mg delayed release capsule Take 1 capsule by mouth daily 1/16/14  Yes Historical Provider, MD   hydrOXYzine pamoate (VISTARIL) 50 mg capsule Take 1 capsule by mouth 3 (three) times a day 9/1/16  Yes Historical Provider, MD   pantoprazole (PROTONIX) 40 mg tablet Take 40 mg by mouth daily   Yes Historical Provider, MD   baclofen 20 mg tablet Take 20 mg by mouth 2 (two) times a day    Historical Provider, MD   busPIRone (BUSPAR) 15 mg tablet Take 15 mg by mouth 3 (three) times a day    Historical Provider, MD   gabapentin (NEURONTIN) 800 mg tablet Take 1 tablet by mouth 3 (three) times a day    Historical Provider, MD   mirtazapine (REMERON) 30 mg tablet Take 30 mg by mouth daily at bedtime    Historical Provider, MD   QUEtiapine (SEROquel) 50 mg tablet Take 50 mg by mouth daily at bedtime    Historical Provider, MD   rOPINIRole (REQUIP) 4 mg tablet Take 8 mg by mouth daily at bedtime      Historical Provider, MD   temazepam (RESTORIL) 30 mg capsule Take 30 mg by mouth daily at bedtime as needed    Historical Provider, MD   venlafaxine (EFFEXOR) 37 5 mg tablet Take 150 mg by mouth daily    Historical Provider, MD WORKMAN have reviewed home medications with patient personally  Allergies: Allergies   Allergen Reactions    Asa [Aspirin] Swelling    Nsaids      Due to gastric bypass surgery    Ceclor [Cefaclor] Rash       Social History:     Marital Status: Single   Occupation:  Noncontributory  Patient Pre-hospital Living Situation:    Patient Pre-hospital Level of Mobility:  Full  Patient Pre-hospital Diet Restrictions: Nnone  Substance Use History:   History   Alcohol Use No     History   Smoking Status    Current Every Day Smoker    Packs/day: 0 50    Years: 0 00   Smokeless Tobacco    Never Used     History   Drug Use No       Family History:    History reviewed  No pertinent family history  Physical Exam:     Vitals:   Blood Pressure: 125/57 (03/06/18 0921)  Pulse: (!) 108 (03/06/18 0921)  Temperature: 99 1 °F (37 3 °C) (03/06/18 0921)  Temp Source: Oral (03/06/18 0921)  Respirations: 14 (03/06/18 0921)  Height: 5' 6" (167 6 cm) (03/06/18 0921)  Weight - Scale: 93 4 kg (205 lb 14 6 oz) (03/06/18 0921)  SpO2: 93 % (03/06/18 0928)    Physical Exam   Constitutional: She is oriented to person, place, and time  Vital signs are normal  She appears well-developed and well-nourished  Non-toxic appearance  No distress  HENT:   Head: Normocephalic and atraumatic     Mouth/Throat: Oropharynx is clear and moist and mucous membranes are normal  Abnormal dentition  No oropharyngeal exudate  Eyes: Conjunctivae are normal  Pupils are equal, round, and reactive to light  Right eye exhibits no discharge  Left eye exhibits no discharge  No scleral icterus  Neck: No JVD present  No tracheal deviation and no erythema present  Cardiovascular: Normal rate, regular rhythm, normal heart sounds, intact distal pulses and normal pulses  Exam reveals no gallop and no friction rub  No murmur heard  Pulmonary/Chest: No accessory muscle usage or stridor  Tachypnea noted  She is in respiratory distress  She has no decreased breath sounds  She has wheezes in the right lower field and the left lower field  She has no rales  Abdominal: Soft  Bowel sounds are normal  She exhibits no distension and no mass  There is no tenderness  There is no rebound  Musculoskeletal: She exhibits no edema, tenderness or deformity  B/L lower extremity swelling with no edema up to the knees, warm to the touch   Neurological: She is alert and oriented to person, place, and time  GCS eye subscore is 4  GCS verbal subscore is 5  GCS motor subscore is 6  Skin: Skin is warm and dry  No rash noted  She is not diaphoretic  No erythema  No pallor  Psychiatric: She has a normal mood and affect  Her behavior is normal        Additional Data:     Lab Results: I have personally reviewed pertinent reports          Results from last 7 days  Lab Units 03/06/18  0620   WBC Thousand/uL 20 80*   HEMOGLOBIN g/dL 10 3*   HEMATOCRIT % 33 6*   PLATELETS Thousands/uL 341   NEUTROS PCT % 78*   LYMPHS PCT % 15   MONOS PCT % 6   EOS PCT % 1       Results from last 7 days  Lab Units 03/06/18  0620   SODIUM mmol/L 138   POTASSIUM mmol/L 3 6   CHLORIDE mmol/L 101   CO2 mmol/L 24   BUN mg/dL 9   CREATININE mg/dL 0 76   CALCIUM mg/dL 7 7*   TOTAL PROTEIN g/dL 6 6   BILIRUBIN TOTAL mg/dL 0 40   ALK PHOS U/L 137*   ALT U/L 19   AST U/L 18   GLUCOSE RANDOM mg/dL 102       Results from last 7 days  Lab Units 03/06/18  0620   INR  1 04       Imaging: I have personally reviewed pertinent reports  CTA ED chest PE study   Final Result by Isela Bowles DO (03/06 8568)      Acute right lower lobe segmental and subsegmental pulmonary emboli  Measured RV/LV ratio is within normal limits at less than 0 9  Diffuse groundglass opacity in the lungs with mosaic attenuation, appearance of which is nonspecific with differential considerations including hypersensitivity pneumonitis, acute interstitial pneumonitis (AIP), sequela of small airway disease, early    alveolar edema or potentially atypical infection in an immunocompromised patient (if relevant)  Follow-up chest films advised  I personally discussed this study with ADAM PORTILLO on 3/6/2018 at 7:38 AM                Workstation performed: [id number]             EKG, Pathology, and Other Studies Reviewed on Admission:   · EKG:  Sinus tachycardia   Vent  rate 109 BPM   MD interval 126 ms   QRS duration 82 ms   QT/QTc 310/417 ms   P-R-T axes 61 44 59   /68 mmHg    Allscripts / Epic Records Reviewed: Yes     ** Please Note: This note has been constructed using a voice recognition system   **

## 2018-03-06 NOTE — CONSULTS
Pulmonary Consultation   Tio Kohler 38 y o  female MRN: [id number]  Unit/Bed#: -02 Encounter: [id number]      Reason for consultation: Acute hypoxic respiratory failure, PE    Requesting physician: Dr Jan Moreno    Impressions:   1  Acute hypoxic respiratory failure 2-4   2  Bilateral ground glass opacities on CT - possible pulmonary edema, viral or atypical infection, ARDS or inflammatory pneumonitis such as AIP, hypersensitivity pneumonitis, RB- ILD  3  RLL PE no RV strain on Echo, however ? Possible CTEPH given previous history of PE on Xarelto which she stopped 1 year ago   4  Current everyday smoker/ likely COPD    Recommendations:  1  Due to respiratory distress and given history of COPD will switch to BiPAP 12/8  I explained that there is a high likelihood that she could end up intubated  She understands and is agreeable to it if necessary  2  Agree with Lasix 40mg IV Now  3  Stat echocardiogram to rule out RV strain or CTEPH  4  Continue heparin gtt, Follow PTT goal 60-90  5  Check sputum culture,  Continue Levaquin 750 mg for presumed atypical infection/CAP  6  Follow Daily CXR  7  Continue Solumedrol 40mg IV q6  8  Start xopenex and Atrovent every 6 hrs  9  Minimize sedative medications as possible    History of Present Illness   HPI:  Tio Kohler is a 38 y o  female with PMHx of PE on Xarelto and tobacco abuse with presumed COPD comes who comes in with acute hypoxic respiratory failure  She states that she had been down in [place]  She had been doing fine from a respiratory standpoint  In [place] she did not have any respiratory exposures that she knows of  The approximately 2 weeks ago she developed swelling of the R lower extremity  She had a previous PE and was on chronic Xarelto that she decided to stop on her own  The swelling grew progressively worse and she went to the ER to be evaluated    She did not receive an ultrasound or any treatment  Approximately 1 week ago she developed dyspnea on exertion which grew progressively worse over the past week  She denied fever, chills, night sweats, cough, recent URI, or sick contacts  Then approximately 3 days ago the dyspnea grew significantly worse all of a sudden  She admitted to chest tightness, lightheadedness and dizziness  She states that even on her way to the hospital she even had episodes of hallucinations  Review of systems:  ROS:   Review of Systems   Constitutional: Positive for chills and fatigue  Negative for fever  HENT: Negative for congestion, nosebleeds, sinus pressure and sore throat  Respiratory: Negative for chest tightness and shortness of breath  Cardiovascular: Negative for chest pain and palpitations  Gastrointestinal: Negative for abdominal distention, blood in stool and vomiting  Endocrine: Negative for cold intolerance and polyphagia  Genitourinary: Negative for decreased urine volume, difficulty urinating, menstrual problem and vaginal pain  Musculoskeletal: Positive for back pain  Foot pain   Skin: Negative  Allergic/Immunologic: Negative for environmental allergies and immunocompromised state  Neurological: Negative for dizziness and seizures  Psychiatric/Behavioral: Negative for agitation, dysphoric mood and suicidal ideas         Historical Information   Past Medical History:   Diagnosis Date    Arthritis 10/23/2015    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    Chronic pain     Closed dislocation of tarsometatarsal joint 2/3/2015    Closed fracture of multiple ribs 3/25/2017    Closed fracture of phalanx of foot 3/25/2017    Closed fracture of tarsal and metatarsal bones 3/25/2017    Complex regional pain syndrome type 1 of lower extremity 4/12/2016    Depression     History of pulmonary embolism 3/25/2017    Iron deficiency anemia     Pulmonary embolism (HCC)     Seizures ([facility] [address] )     Treatment of healed fracture follow-up examination 3/25/2017     Past Surgical History:   Procedure Laterality Date    FOOT SURGERY      GASTRECTOMY  2006    GASTRIC BYPASS      HERNIA REPAIR      HYSTERECTOMY       Family History   Problem Relation Age of Onset    Heart disease Mother     Cancer Mother     Lung disease Maternal Grandmother        Occupational History: on disability, homemaker    Social History     Social History    Marital status: Single     Spouse name: N/A    Number of children: N/A    Years of education: N/A     Social History Main Topics    Smoking status: Current Every Day Smoker     Packs/day: 0 50     Years: 25 00    Smokeless tobacco: Never Used    Alcohol use No    Drug use: No    Sexual activity: Not Asked     Other Topics Concern    None     Social History Narrative    None         Meds/Allergies   Current Facility-Administered Medications   Medication Dose Route Frequency    albuterol inhalation solution 2 5 mg  2 5 mg Nebulization Q4H PRN    And    sodium chloride 0 9 % inhalation solution 3 mL  3 mL Nebulization Q4H PRN    baclofen tablet 20 mg  20 mg Oral BID    busPIRone (BUSPAR) tablet 15 mg  15 mg Oral TID    calcium carbonate (TUMS) chewable tablet 1,000 mg  1,000 mg Oral Daily PRN    docusate sodium (COLACE) capsule 100 mg  100 mg Oral BID    DULoxetine (CYMBALTA) delayed release capsule 30 mg  30 mg Oral Daily    gabapentin (NEURONTIN) capsule 800 mg  800 mg Oral 4x Daily    heparin (porcine) 25,000 units in 250 mL infusion (premix)  3-30 Units/kg/hr (Order-Specific) Intravenous Titrated    heparin (porcine) injection 3,600 Units  3,600 Units Intravenous PRN    heparin (porcine) injection 7,200 Units  7,200 Units Intravenous PRN    hydrOXYzine HCL (ATARAX) tablet 50 mg  50 mg Oral HS    iohexol (OMNIPAQUE) 350 MG/ML injection (MULTI-DOSE) 100 mL  100 mL Intravenous Once in imaging    levofloxacin (LEVAQUIN) tablet 750 mg  750 mg Oral Q24H    loperamide (IMODIUM) capsule 2 mg  2 mg Oral Q4H PRN    methylPREDNISolone sodium succinate (Solu-MEDROL) injection 40 mg  40 mg Intravenous Q6H [address]    mirtazapine (REMERON) tablet 30 mg  30 mg Oral HS    nicotine (NICODERM CQ) 14 mg/24hr TD 24 hr patch 1 patch  1 patch Transdermal Daily    pantoprazole (PROTONIX) EC tablet 40 mg  40 mg Oral Daily    QUEtiapine (SEROquel) tablet 50 mg  50 mg Oral HS    rOPINIRole (REQUIP) tablet 8 mg  8 mg Oral HS    temazepam (RESTORIL) capsule 30 mg  30 mg Oral HS PRN    venlafaxine (EFFEXOR-XR) 24 hr capsule 150 mg  150 mg Oral Daily     Prescriptions Prior to Admission   Medication    DULoxetine (CYMBALTA) 30 mg delayed release capsule    hydrOXYzine pamoate (VISTARIL) 50 mg capsule    pantoprazole (PROTONIX) 40 mg tablet    baclofen 20 mg tablet    busPIRone (BUSPAR) 15 mg tablet    gabapentin (NEURONTIN) 800 mg tablet    mirtazapine (REMERON) 30 mg tablet    QUEtiapine (SEROquel) 50 mg tablet    rOPINIRole (REQUIP) 4 mg tablet    temazepam (RESTORIL) 30 mg capsule    venlafaxine (EFFEXOR) 37 5 mg tablet     Allergies   Allergen Reactions    Asa [Aspirin] Swelling    Nsaids      Due to gastric bypass surgery    Ceclor [Cefaclor] Rash       Home medications:  Prior to Admission medications    Medication Sig Start Date End Date Taking?  Authorizing Provider   DULoxetine (CYMBALTA) 30 mg delayed release capsule Take 1 capsule by mouth daily 1/16/14  Yes Historical Provider, MD   hydrOXYzine pamoate (VISTARIL) 50 mg capsule Take 1 capsule by mouth 3 (three) times a day 9/1/16  Yes Historical Provider, MD   pantoprazole (PROTONIX) 40 mg tablet Take 40 mg by mouth daily   Yes Historical Provider, MD   baclofen 20 mg tablet Take 20 mg by mouth 2 (two) times a day    Historical Provider, MD   busPIRone (BUSPAR) 15 mg tablet Take 15 mg by mouth 3 (three) times a day    Historical Provider, MD   gabapentin (NEURONTIN) 800 mg tablet Take 1 tablet by mouth 4 (four) times a day Historical Provider, MD   mirtazapine (REMERON) 30 mg tablet Take 30 mg by mouth daily at bedtime    Historical Provider, MD   QUEtiapine (SEROquel) 50 mg tablet Take 50 mg by mouth daily at bedtime    Historical Provider, MD   rOPINIRole (REQUIP) 4 mg tablet Take 8 mg by mouth daily at bedtime      Historical Provider, MD   temazepam (RESTORIL) 30 mg capsule Take 30 mg by mouth daily at bedtime as needed    Historical Provider, MD   venlafaxine (EFFEXOR) 37 5 mg tablet Take 37 5 mg by mouth daily      Historical Provider, MD       Vitals: Tmax Temp (24hrs), Av 3 °F (37 4 °C), Min:99 1 °F (37 3 °C), Max:99 5 °F (37 5 °C)    Blood pressure 125/57, pulse (!) 108, temperature 99 1 °F (37 3 °C), temperature source Oral, resp  rate 14, height 5' 6" (1 676 m), weight 93 4 kg (205 lb 14 6 oz), SpO2 95 %  , Nonrebreather, Body mass index is 33 23 kg/m²  No intake or output data in the 24 hours ending 18 1608    Physical Exam  General: Awake alert and oriented x 3, conversant without conversational dyspnea, NAD, normal affect, mild somnolence  HEENT:  PERRL, Sclera noninjected, nonicteric OU, Nares patent, no nasal flaring, no nasal drainage, Mucous membranes, moist, no oral lesions, normal dentition  NECK: Trachea midline, no accessory muscle use, no stridor, no cervical or supraclavicular adenopathy, JVP not elevated  CARDIAC: Reg, single s1/S2, no m/r/g  PULM: crackles bilaterally, diffusely  No rhonchi or rales  CHEST: No gross deformities, equal chest expansion on inspiration bilaterally  ABD: Normoactive bowel sounds, soft nontender, nondistended, no rebound, no rigidity, no guarding  EXT: No cyanosis, no clubbing,1+ lower extremity edema, normal capillary refill  SKIN:  No rashes, no lesions  NEURO: no focal neurologic deficits, AAOx3, moving all extremities appropriately    Labs: I have personally reviewed pertinent lab results      Results from last 7 days  Lab Units 18  0620   WBC Thousand/uL 20 80*   HEMOGLOBIN g/dL 10 3*   HEMATOCRIT % 33 6*   PLATELETS Thousands/uL 341   NEUTROS PCT % 78*   MONOS PCT % 6        Results from last 7 days  Lab Units 03/06/18  0620   SODIUM mmol/L 138   POTASSIUM mmol/L 3 6   CHLORIDE mmol/L 101   CO2 mmol/L 24   BUN mg/dL 9   CREATININE mg/dL 0 76   CALCIUM mg/dL 7 7*   TOTAL PROTEIN g/dL 6 6   BILIRUBIN TOTAL mg/dL 0 40   ALK PHOS U/L 137*   ALT U/L 19   AST U/L 18   GLUCOSE RANDOM mg/dL 102                Results from last 7 days  Lab Units 03/06/18  1424 03/06/18  0810 03/06/18  0620   INR   --   --  1 04   PTT seconds 125* 28 33           0  Lab Value Date/Time   TROPONINI <0 02 03/06/2018 3366       Micro:  No results found for: Kirti Kraft, Antoni Spence, SPUTUMCULTUR    Imaging and other studies: I have personally reviewed pertinent reports  CTA chest - IMPRESSION:  Acute right lower lobe segmental and subsegmental pulmonary emboli  Measured RV/LV ratio is within normal limits at less than 0 9        Diffuse groundglass opacity in the lungs with mosaic attenuation, appearance of which is nonspecific with differential considerations including hypersensitivity pneumonitis, acute interstitial pneumonitis (AIP), sequela of small airway disease, early   alveolar edema or potentially atypical infection in an immunocompromised patient (if relevant)  Follow-up chest films advised      Pulmonary function testing: None    EKG, Pathology, and Other Studies: I have personally reviewed pertinent reports      Stat Echo pending    Code Status: Level 1 - Full Code      DO [facility]

## 2018-03-06 NOTE — PROGRESS NOTES
Pt sob at rest  o2 sats on 3l 85%  [employer] PA  Notified, sat incrase to 4l 89%  Orders to follow  [facility]

## 2018-03-06 NOTE — ASSESSMENT & PLAN NOTE
CT:  Acute right lower lobe segmental and subsegmental pulmonary emboli     No evidence of RV strain  Heparin drip  Patient reportedly stopped her NOAC because she did not want to take so many [employer] tomorrow

## 2018-03-06 NOTE — PROGRESS NOTES
Came to reassess the patient twice  Originally she was still on nonrebreather and with mild dyspnea  Saturation was 97 with nonrebreather on  She then took nonrebreather off and saturation would be from 89 - 90%  She stated at that point that she was feeling better from a breathing standpoint  However, due to some mild work of breathing and tachycardia, I elected to use BiPAP to support respiratory status  I came to see her again on BiPAP 12/8, respiratory rate was 18, she was breathing comfortably, saturation was 100%  Pulling TV of 450 - 500  She stated that she was feeling much better  The plan for tonight is to continue BiPAP, attempt trial off BiPAP later tonight on nasal cannula  Then will use BiPAP nocturnally  Repeat CXR and ABG in AM then trial off again tomorrow if doing well  Also discussed echocardiogram   I explained even if there is evidence of pulmonary hypertension, the clot size does not explain that  I would imagine it would be related to alveolar opacities and/or CTEPH  I do not feel that thrombolysis would be indicated unless she develops hemodynamic instability  Plan was discussed with patient and Dr Chico Maza

## 2018-03-06 NOTE — ED PROVIDER NOTES
History  Chief Complaint   Patient presents with    Shortness of Breath     Pt presents to ED with SOB  Pt felt SOB for a day  Pt did not report to ER sooner because she was traveling from [place]   states her sats on the way home were at 85%  Chest pain at 7  Hx of PE  History provided by:  Patient   used: No        Patient is a 68-year-old female presenting to emergency department chest pain shortness of breath  So again sure breath yesterday  Will driving from [place] but not want stop  No cough  No fevers or chills  No nausea or vomiting  Feels like previous blood clots  She has had multiple blood clots in the past   Was on blood thinner year ago, stopped taking it  MDM  Concern for PE, will do cardiac workup with CT PE study, likely admission to hospital as patient is hypoxic at baseline at this time      Prior to Admission Medications   Prescriptions Last Dose Informant Patient Reported? Taking?    DULoxetine (CYMBALTA) 30 mg delayed release capsule   Yes Yes   Sig: Take 1 capsule by mouth daily   QUEtiapine (SEROquel) 50 mg tablet   Yes No   Sig: Take 50 mg by mouth daily at bedtime   baclofen 20 mg tablet   Yes No   Sig: Take 20 mg by mouth 2 (two) times a day   busPIRone (BUSPAR) 15 mg tablet   Yes No   Sig: Take 15 mg by mouth 3 (three) times a day   gabapentin (NEURONTIN) 800 mg tablet   Yes No   Sig: Take 1 tablet by mouth 4 (four) times a day     hydrOXYzine pamoate (VISTARIL) 50 mg capsule   Yes Yes   Sig: Take 1 capsule by mouth 3 (three) times a day   mirtazapine (REMERON) 30 mg tablet   Yes No   Sig: Take 30 mg by mouth daily at bedtime   pantoprazole (PROTONIX) 40 mg tablet   Yes Yes   Sig: Take 40 mg by mouth daily   rOPINIRole (REQUIP) 4 mg tablet   Yes No   Sig: Take 8 mg by mouth daily at bedtime     temazepam (RESTORIL) 30 mg capsule   Yes No   Sig: Take 30 mg by mouth daily at bedtime as needed   venlafaxine (EFFEXOR) 37 5 mg tablet   Yes No Sig: Take 37 5 mg by mouth daily        Facility-Administered Medications: None       Past Medical History:   Diagnosis Date    Arthritis 10/23/2015    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    Chronic pain     Closed dislocation of tarsometatarsal joint 2/3/2015    Closed fracture of multiple ribs 3/25/2017    Closed fracture of phalanx of foot 3/25/2017    Closed fracture of tarsal and metatarsal bones 3/25/2017    Complex regional pain syndrome type 1 of lower extremity 4/12/2016    Depression     History of pulmonary embolism 3/25/2017    Iron deficiency anemia     Pulmonary embolism (HCC)     Seizures ([facility] [address] )     Treatment of healed fracture follow-up examination 3/25/2017       Past Surgical History:   Procedure Laterality Date    FOOT SURGERY      GASTRECTOMY  2006    GASTRIC BYPASS      HERNIA REPAIR      HYSTERECTOMY         History reviewed  No pertinent family history  I have reviewed and agree with the history as documented  Social History   Substance Use Topics    Smoking status: Current Every Day Smoker     Packs/day: 0 50     Years: 0 00    Smokeless tobacco: Never Used    Alcohol use No        Review of Systems   Constitutional: Negative for chills, diaphoresis and fever  HENT: Negative for congestion and sore throat  Respiratory: Positive for shortness of breath  Negative for cough, wheezing and stridor  Cardiovascular: Positive for chest pain  Negative for palpitations and leg swelling  Gastrointestinal: Negative for abdominal pain, blood in stool, diarrhea, nausea and vomiting  Genitourinary: Negative for dysuria, frequency and urgency  Musculoskeletal: Negative for neck stiffness  Skin: Negative for pallor and rash  Neurological: Negative for dizziness, syncope, weakness, light-headedness and headaches  All other systems reviewed and are negative        Physical Exam  ED Triage Vitals   Temperature Pulse Respirations Blood Pressure SpO2   03/06/18 0616 03/06/18 0615 03/06/18 0616 03/06/18 0615 03/06/18 0615   99 5 °F (37 5 °C) (!) 116 (!) 24 125/68 (!) 76 %      Temp Source Heart Rate Source Patient Position - Orthostatic VS BP Location FiO2 (%)   03/06/18 0616 03/06/18 0616 03/06/18 0616 03/06/18 0616 03/06/18 0928   Temporal Monitor Sitting Left arm 95      Pain Score       03/06/18 0616       7           Orthostatic Vital Signs  Vitals:    03/06/18 0630 03/06/18 0645 03/06/18 0700 03/06/18 0921   BP: 119/63 118/65 125/65 125/57   Pulse: 102 100 102 (!) 108   Patient Position - Orthostatic VS: Lying Lying  Lying       Physical Exam   Constitutional: She is oriented to person, place, and time  She appears well-developed and well-nourished  HENT:   Head: Normocephalic and atraumatic  Eyes: Pupils are equal, round, and reactive to light  Neck: Neck supple  Cardiovascular: Regular rhythm, normal heart sounds and intact distal pulses  Tachycardic   Pulmonary/Chest: Effort normal and breath sounds normal  No respiratory distress  Abdominal: Soft  Bowel sounds are normal  There is no tenderness  Musculoskeletal: Normal range of motion  She exhibits no edema or tenderness  Neurological: She is alert and oriented to person, place, and time  Skin: Skin is warm and dry  Capillary refill takes less than 2 seconds  No rash noted  No erythema  Vitals reviewed        ED Medications  Medications   iohexol (OMNIPAQUE) 350 MG/ML injection (MULTI-DOSE) 100 mL (not administered)   heparin (porcine) 25,000 units in 250 mL infusion (premix) (18 Units/kg/hr × 90 kg (Order-Specific) Intravenous Continue to Inpatient Floor 3/6/18 0932)   heparin (porcine) injection 7,200 Units (not administered)   heparin (porcine) injection 3,600 Units (not administered)   baclofen tablet 20 mg (not administered)   busPIRone (BUSPAR) tablet 15 mg (not administered)   DULoxetine (CYMBALTA) delayed release capsule 30 mg (not administered)   hydrOXYzine HCL (ATARAX) tablet 50 mg (not administered)   mirtazapine (REMERON) tablet 30 mg (not administered)   pantoprazole (PROTONIX) EC tablet 40 mg (not administered)   QUEtiapine (SEROquel) tablet 50 mg (not administered)   rOPINIRole (REQUIP) tablet 8 mg (not administered)   temazepam (RESTORIL) capsule 30 mg (not administered)   docusate sodium (COLACE) capsule 100 mg (not administered)   calcium carbonate (TUMS) chewable tablet 1,000 mg (not administered)   nicotine (NICODERM CQ) 14 mg/24hr TD 24 hr patch 1 patch (not administered)   albuterol inhalation solution 2 5 mg (not administered)     And   sodium chloride 0 9 % inhalation solution 3 mL (not administered)   methylPREDNISolone sodium succinate (Solu-MEDROL) injection 40 mg (not administered)   levofloxacin (LEVAQUIN) tablet 750 mg (not administered)   loperamide (IMODIUM) capsule 2 mg (not administered)   gabapentin (NEURONTIN) capsule 800 mg (not administered)   venlafaxine (EFFEXOR-XR) 24 hr capsule 150 mg (not administered)   iohexol (OMNIPAQUE) 350 MG/ML injection (MULTI-DOSE) 85 mL (85 mL Intravenous Given 3/6/18 0729)   heparin (porcine) injection 7,200 Units (7,200 Units Intravenous Given 3/6/18 0819)       Diagnostic Studies  Results Reviewed     Procedure Component Value Units Date/Time    APTT [[id number]]  (Normal) Collected:  03/06/18 0810    Lab Status:  Final result Specimen:  Blood from Arm, Right Updated:  03/06/18 0850     PTT 28 seconds     Narrative:          Therapeutic Heparin Range = 60-90 seconds    Troponin I [[id number]]  (Normal) Collected:  03/06/18 0620    Lab Status:  Final result Specimen:  Blood from Arm, Right Updated:  03/06/18 0656     Troponin I <0 02 ng/mL     Narrative:         [employer] Chemistry analyzer 99% cutoff is > 0 04 ng/mL in network labs    o cTnI 99% cutoff is useful only when applied to patients in the clinical setting of myocardial ischemia  o cTnI 99% cutoff should be interpreted in the context of clinical history, ECG findings and possibly cardiac imaging to establish correct diagnosis  o cTnI 99% cutoff may be suggestive but clearly not indicative of a coronary event without the clinical setting of myocardial ischemia  Comprehensive metabolic panel [[id number]]  (Abnormal) Collected:  03/06/18 0620    Lab Status:  Final result Specimen:  Blood from Line, Venous Updated:  03/06/18 0020     Sodium 138 mmol/L      Potassium 3 6 mmol/L      Chloride 101 mmol/L      CO2 24 mmol/L      Anion Gap 13 mmol/L      BUN 9 mg/dL      Creatinine 0 76 mg/dL      Glucose 102 mg/dL      Calcium 7 7 (L) mg/dL      AST 18 U/L      ALT 19 U/L      Alkaline Phosphatase 137 (H) U/L      Total Protein 6 6 g/dL      Albumin 2 5 (L) g/dL      Total Bilirubin 0 40 mg/dL      eGFR 98 ml/min/1 73sq m     Narrative:         National Kidney Disease Education Program recommendations are as follows:  GFR calculation is accurate only with a steady state creatinine  Chronic Kidney disease less than 60 ml/min/1 73 sq  meters  Kidney failure less than 15 ml/min/1 73 sq  meters  APTT [[id number]]  (Normal) Collected:  03/06/18 0620    Lab Status:  Final result Specimen:  Blood from Arm, Right Updated:  03/06/18 0651     PTT 33 seconds     Narrative:          Therapeutic Heparin Range = 60-90 seconds    Protime-INR [[id number]]  (Normal) Collected:  03/06/18 0620    Lab Status:  Final result Specimen:  Blood from Arm, Right Updated:  03/06/18 0651     Protime 13 4 seconds      INR 1 04    CBC and differential [[id number]]  (Abnormal) Collected:  03/06/18 0620    Lab Status:  Final result Specimen:  Blood from Line, Venous Updated:  03/06/18 0642     WBC 20 80 (H) Thousand/uL      RBC 4 57 Million/uL      Hemoglobin 10 3 (L) g/dL      Hematocrit 33 6 (L) %      MCV 74 (L) fL      MCH 22 5 (L) pg      MCHC 30 7 (L) g/dL      RDW 18 8 (H) %      MPV 9 3 fL      Platelets 759 Thousands/uL      Neutrophils Relative 78 (H) %      Lymphocytes Relative 15 %      Monocytes Relative 6 % Eosinophils Relative 1 %      Basophils Relative 0 %      Neutrophils Absolute 16 39 (H) Thousands/µL      Lymphocytes Absolute 3 06 Thousands/µL      Monocytes Absolute 1 18 Thousand/µL      Eosinophils Absolute 0 13 Thousand/µL      Basophils Absolute 0 04 Thousands/µL                  CTA ED chest PE study   Final Result by Ruy Cabrera DO (03/06 1708)      Acute right lower lobe segmental and subsegmental pulmonary emboli  Measured RV/LV ratio is within normal limits at less than 0 9  Diffuse groundglass opacity in the lungs with mosaic attenuation, appearance of which is nonspecific with differential considerations including hypersensitivity pneumonitis, acute interstitial pneumonitis (AIP), sequela of small airway disease, early    alveolar edema or potentially atypical infection in an immunocompromised patient (if relevant)  Follow-up chest films advised  I personally discussed this study with ADAM PORTILLO on 3/6/2018 at 7:38 AM                Workstation performed: [id number]                    Procedures  Procedures       Phone Contacts  ED Phone Contact    ED Course  ED Course as of Mar 06 1246   Tue Mar 06, 2018   0716  ECG shows rate of 109, sinus, normal axis, normal QRS, no significant ST or T-wave changes,  normal intervals, independently interpreted by me    0808  Patient admitting doctor 10 minutes ago  Waiting for call back  MDM  CritCare Time    Disposition  Final diagnoses:   Pulmonary embolism ([address] )     Time reflects when diagnosis was documented in both MDM as applicable and the Disposition within this note     Time User Action Codes Description Comment    3/6/2018  8:24 AM Adam Portillo Add [I26 99] Pulmonary embolism [facility])       ED Disposition     ED Disposition Condition Comment    Admit  Case was discussed with medicine and the patient's admission status was agreed to be Admission Status: inpatient status to the service of Dr Dang Parr   Follow-up Information    None       Current Discharge Medication List      CONTINUE these medications which have NOT CHANGED    Details   DULoxetine (CYMBALTA) 30 mg delayed release capsule Take 1 capsule by mouth daily      hydrOXYzine pamoate (VISTARIL) 50 mg capsule Take 1 capsule by mouth 3 (three) times a day      pantoprazole (PROTONIX) 40 mg tablet Take 40 mg by mouth daily      baclofen 20 mg tablet Take 20 mg by mouth 2 (two) times a day      busPIRone (BUSPAR) 15 mg tablet Take 15 mg by mouth 3 (three) times a day      gabapentin (NEURONTIN) 800 mg tablet Take 1 tablet by mouth 4 (four) times a day        mirtazapine (REMERON) 30 mg tablet Take 30 mg by mouth daily at bedtime      QUEtiapine (SEROquel) 50 mg tablet Take 50 mg by mouth daily at bedtime      rOPINIRole (REQUIP) 4 mg tablet Take 8 mg by mouth daily at bedtime        temazepam (RESTORIL) 30 mg capsule Take 30 mg by mouth daily at bedtime as needed      venlafaxine (EFFEXOR) 37 5 mg tablet Take 37 5 mg by mouth daily             No discharge procedures on file      ED Provider  Electronically Signed by           Carol Gutiérrez MD  03/06/18 4375

## 2018-03-06 NOTE — ASSESSMENT & PLAN NOTE
Likely secondary to pulmonary embolism and COPD exacerbation  3 L NC O2  Patient complains shortness of breath x2 days, chronic smoker  Patient checked O2 sat at home and was at 72% yesterday on Ra, 85% in ED, now 93% with nasal cannula  Solu-Medrol 40 mg IV q 6h  Levaquin 750 mg p o  Albuterol inhaler p r n

## 2018-03-06 NOTE — CASE MANAGEMENT
Initial Clinical Review    Admission: Date/Time/Statement: 3/6/18 @ 0825     Orders Placed This Encounter   Procedures    Inpatient Admission (expected length of stay for this patient is greater than two midnights)     Standing Status:   Standing     Number of Occurrences:   1     Order Specific Question:   Admitting Physician     Answer:   Brice Song [[id number]]     Order Specific Question:   Level of Care     Answer:   Med Surg [16]     Order Specific Question:   Estimated length of stay     Answer:   More than 2 Midnights     Order Specific Question:   Certification     Answer:   I certify that inpatient services are medically necessary for this patient for a duration of greater than two midnights  See H&P and MD Progress Notes for additional information about the patient's course of treatment  ED: Date/Time/Mode of Arrival:   ED Arrival Information     Expected Arrival Acuity Means of Arrival Escorted By Service Admission Type    - 3/6/2018 06:03 Emergent [address]  [facility]    Arrival Complaint    SHORTNESS OF BREATH          Chief Complaint:   Chief Complaint   Patient presents with    Shortness of Breath     Pt presents to ED with SOB  Pt felt SOB for a day  Pt did not report to ER sooner because she was traveling from [place]   states her sats on the way home were at 85%  Chest pain at 7  Hx of PE  History of Illness: 38 y o  female the past medical history for the pulmonary embolism, smoking who presents with shortness of breath x2 days  Patient was reportedly short of breath yesterday while walking on the sidewalk  She reportedly checked her O2 sat at home with her daughters pulse oximeter and was found to be 72% on room air  She then proceeded to take a a 16 hour car ride home from [place] to [place]    Patient reportedly went to the ED in [place] due to lower extremity swelling, however she stated that she would not receive any treatment at that time  While in the ED she was found to be hypoxic with an O2 sat of 76% on room air  A nasal cannula was placed to 3 L which brought her O2 sat up to 93%  A CT scan showed an acute right lower lobe segmental and subsegmental pulmonary emboli  The patient was reportedly on Xarelto and she stopped it on her own accord last year because she did want to be taking so many medications      ED Vital Signs:   ED Triage Vitals   Temperature Pulse Respirations Blood Pressure SpO2   03/06/18 0616 03/06/18 0615 03/06/18 0616 03/06/18 0615 03/06/18 0615   99 5 °F (37 5 °C) (!) 116 (!) 24 125/68 (!) 76 %      Temp Source Heart Rate Source Patient Position - Orthostatic VS BP Location FiO2 (%)   03/06/18 0616 03/06/18 0616 03/06/18 0616 03/06/18 0616 03/06/18 0928   Temporal Monitor Sitting Left arm 95      Pain Score       03/06/18 0616       7        Wt Readings from Last 1 Encounters:   03/06/18 93 4 kg (205 lb 14 6 oz)       Vital Signs (abnormal):   03/06/18 0921  99 1 °F (37 3 °C)   108  14  125/57  93 %  Nasal cannula  Lying   03/06/18 0700  --  102   26  125/65  98 %  --  --   03/06/18 0645  --  100   24  118/65  97 %  Nasal cannula  Lying   03/06/18 0630  --  102   24  119/63  96 %  Nasal cannula  Lying   03/06/18 0622  --  102  22  125/68  93 %  None (Room air)  Sitting   03/06/18 0618  --  --  --  --  --  Nasal cannula  --   03/06/18 0616  99 5 °F (37 5 °C)   112   24  125/68   85 %  Nasal cannula  Sitting   03/06/18 0615  --   116  --  125/68   76 %  None (Room air)  --       Abnormal Labs/Diagnostic Test Results: ca 7 7--alk phos 137--alb 2 5  Wbc 20 80--hgb 10 3/33 6  Cta of chest-  Impression:      Acute right lower lobe segmental and subsegmental pulmonary emboli   Measured RV/LV ratio is within normal limits at less than 0 9       Diffuse groundglass opacity in the lungs with mosaic attenuation, appearance of which is nonspecific with differential considerations including hypersensitivity pneumonitis, acute interstitial pneumonitis (AIP), sequela of small airway disease, early   alveolar edema or potentially atypical infection in an immunocompromised patient (if relevant)   Follow-up chest films advised  ED Treatment:   Medication Administration from 03/06/2018 0603 to 03/06/2018 0915       Date/Time Order Dose Route Action Action by Comments     03/06/2018 0792 iohexol (OMNIPAQUE) 350 MG/ML injection (MULTI-DOSE) 85 mL 85 mL Intravenous Given Fabienne Garcia      03/06/2018 0819 heparin (porcine) injection 7,200 Units 7,200 Units Intravenous Given Cam Maxwell RN      03/06/2018 6780 heparin (porcine) 25,000 units in 250 mL infusion (premix) 18 Units/kg/hr Intravenous New Bag Cam Maxwell RN           Past Medical/Surgical History:    Active Ambulatory Problems     Diagnosis Date Noted    Anxiety 03/25/2017    Chronic pain associated with significant psychosocial dysfunction 03/25/2017    Current smoker 03/25/2017    Epilepsy ([facility] [address] ) 03/25/2017    Suicidal overdose ([facility] [address] ) 03/25/2017    Anemia 03/27/2017    Infected dental caries 06/10/2017     Resolved Ambulatory Problems     Diagnosis Date Noted    Treatment of healed fracture follow-up examination 03/25/2017    Closed fracture of tarsal and metatarsal bones 03/25/2017    Complex regional pain syndrome type 1 of lower extremity 04/12/2016    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    Closed dislocation of tarsometatarsal joint 02/03/2015    Closed fracture of multiple ribs 03/25/2017    Closed fracture of phalanx of foot 03/25/2017    History of pulmonary embolism 03/25/2017    Arthritis 10/23/2015    Somnolence 03/25/2017    Overdose of muscle relaxant 03/25/2017     Past Medical History:   Diagnosis Date    Arthritis 10/23/2015    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    Chronic pain     Closed dislocation of tarsometatarsal joint 2/3/2015    Closed fracture of multiple ribs 3/25/2017    Closed fracture of phalanx of foot 3/25/2017    Closed fracture of tarsal and metatarsal bones 3/25/2017    Complex regional pain syndrome type 1 of lower extremity 4/12/2016    Depression     History of pulmonary embolism 3/25/2017    Iron deficiency anemia     Pulmonary embolism (HCC)     Seizures (HCC)     Treatment of healed fracture follow-up examination 3/25/2017       Admitting Diagnosis: Pulmonary embolism ([facility] [address] ) [I26 99]  Shortness of breath [R06 02]    Age/Sex: 38 y o  female    Assessment/Plan:   * Acute pulmonary embolism (HCC)   Assessment & Plan     CT:  Acute right lower lobe segmental and subsegmental pulmonary emboli  No evidence of RV strain  Heparin drip  Patient reportedly stopped her NOAC because she did not want to take so many [place] Eliquis tomorrow          Acute respiratory failure with hypoxia ([facility] )   Assessment & Plan     Likely secondary to pulmonary embolism and COPD exacerbation  3 L NC O2  Patient complains shortness of breath x2 days, chronic smoker  Patient checked O2 sat at home and was at 72% yesterday on Ra, 85% in ED, now 93% with nasal cannula  Solu-Medrol 40 mg IV q 6h  Levaquin 750 mg p o  Albuterol inhaler p r n           Chronic obstructive pulmonary disease with acute exacerbation (HCC)   Assessment & Plan     Patient complains shortness of breath x2 days, chronic smoker  Patient checked O2 sat at home and was at 72% yesterday, 85% in ED, 93% on 3 L nasal cannula  Solu-Medrol 40 mg IV q 6h  Levaquin 750 mg p o    Albuterol inhaler p r n           Current smoker   Assessment & Plan     Smoking cessation discussed  Dictator placement therapy          Epilepsy ([facility] )   Assessment & Plan     No seizure activity noted, continue home meds          Anxiety   Assessment & Plan     Continue home meds             VTE Prophylaxis: Heparin Drip  / sequential compression device   Code Status:  Full  POLST: There is no POLST form on file for this patient (pre-hospital)  Discussion with family:  at bedside     Anticipated Length of Stay:  Patient will be admitted on an Inpatient basis with an anticipated length of stay of  greater than 2 midnights           Admission Orders:  Scheduled Meds:   Current Facility-Administered Medications:  albuterol 2 5 mg Nebulization Q4H PRN Paolaield Lesches, PA-C    And        sodium chloride 3 mL Nebulization Q4H PRN Paola Lesches, PA-C    baclofen 20 mg Oral BID Troy Lesches, PA-C    busPIRone 15 mg Oral TID Troy Lesches, PA-C    calcium carbonate 1,000 mg Oral Daily PRN Troy Lesches, PA-C    docusate sodium 100 mg Oral BID Troy Lesches, PA-C    DULoxetine 30 mg Oral Daily Troy Lesadan, PA-C    gabapentin 800 mg Oral TID Troy Lesches, PA-C    heparin (porcine) 3-30 Units/kg/hr (Order-Specific) Intravenous Titrated Xiang Gates MD Last Rate: 18 Units/kg/hr (03/06/18 0819)   heparin (porcine) 3,600 Units Intravenous PRN Verónica Major MD    heparin (porcine) 7,200 Units Intravenous PRN Verónica Major MD    hydrOXYzine HCL 50 mg Oral HS Troy Lesches, PA-C    iohexol 100 mL Intravenous Once in imaging Xiang Gates MD    levofloxacin 750 mg Oral Q24H Troy Lesches, PA-C    methylPREDNISolone sodium succinate 40 mg Intravenous Q6H [address] Paola Lesches, PA-C    mirtazapine 30 mg Oral HS Troy Lesches, PA-C    nicotine 1 patch Transdermal Daily Troy Lesches, PA-C    pantoprazole 40 mg Oral Daily Troy Lesches, PA-C    QUEtiapine 50 mg Oral HS Troy Lesches, PA-C    rOPINIRole 8 mg Oral HS Troy Lesches, PA-C    temazepam 30 mg Oral HS PRN Troy Lesches, PA-C    venlafaxine 150 mg Oral Daily Troy Lesches, PA-C      Continuous Infusions:   heparin (porcine) 3-30 Units/kg/hr (Order-Specific) Last Rate: 18 Units/kg/hr (03/06/18 0819)     PRN Meds:   albuterol **AND** sodium chloride    calcium carbonate    heparin (porcine)    heparin (porcine)    iohexol    temazepam     venodynes  Nasal 02 3l   telm  Bariatric maintiance diet

## 2018-03-06 NOTE — ASSESSMENT & PLAN NOTE
Patient complains shortness of breath x2 days, chronic smoker  Patient checked O2 sat at home and was at 72% yesterday, 85% in ED, 93% on 3 L nasal cannula  Solu-Medrol 40 mg IV q 6h  Levaquin 750 mg p o  Albuterol inhaler p r n

## 2018-03-07 ENCOUNTER — APPOINTMENT (INPATIENT)
Dept: RADIOLOGY | Facility: HOSPITAL | Age: 43
DRG: 134 | End: 2018-03-07
Payer: COMMERCIAL

## 2018-03-07 LAB
ALBUMIN SERPL BCP-MCNC: 2.7 G/DL (ref 3.5–5)
ALP SERPL-CCNC: 169 U/L (ref 46–116)
ALT SERPL W P-5'-P-CCNC: 17 U/L (ref 12–78)
ANION GAP SERPL CALCULATED.3IONS-SCNC: 13 MMOL/L (ref 4–13)
ANISOCYTOSIS BLD QL SMEAR: PRESENT
APTT PPP: 53 SECONDS (ref 23–35)
APTT PPP: 87 SECONDS (ref 23–35)
AST SERPL W P-5'-P-CCNC: 12 U/L (ref 5–45)
BASOPHILS # BLD MANUAL: 0 THOUSAND/UL (ref 0–0.1)
BASOPHILS NFR MAR MANUAL: 0 % (ref 0–1)
BILIRUB SERPL-MCNC: 0.4 MG/DL (ref 0.2–1)
BUN SERPL-MCNC: 8 MG/DL (ref 5–25)
CALCIUM SERPL-MCNC: 9.6 MG/DL (ref 8.3–10.1)
CHLORIDE SERPL-SCNC: 101 MMOL/L (ref 100–108)
CO2 SERPL-SCNC: 26 MMOL/L (ref 21–32)
CREAT SERPL-MCNC: 0.69 MG/DL (ref 0.6–1.3)
DACRYOCYTES BLD QL SMEAR: PRESENT
EOSINOPHIL # BLD MANUAL: 0 THOUSAND/UL (ref 0–0.4)
EOSINOPHIL NFR BLD MANUAL: 0 % (ref 0–6)
ERYTHROCYTE [DISTWIDTH] IN BLOOD BY AUTOMATED COUNT: 19 % (ref 11.6–15.1)
FLUAV AG SPEC QL: NORMAL
FLUBV AG SPEC QL: NORMAL
GFR SERPL CREATININE-BSD FRML MDRD: 109 ML/MIN/1.73SQ M
GLUCOSE SERPL-MCNC: 121 MG/DL (ref 65–140)
HCT VFR BLD AUTO: 37.2 % (ref 34.8–46.1)
HGB BLD-MCNC: 11.7 G/DL (ref 11.5–15.4)
HYPERCHROMIA BLD QL SMEAR: PRESENT
LYMPHOCYTES # BLD AUTO: 1.62 THOUSAND/UL (ref 0.6–4.47)
LYMPHOCYTES # BLD AUTO: 13 % (ref 14–44)
MCH RBC QN AUTO: 22.9 PG (ref 26.8–34.3)
MCHC RBC AUTO-ENTMCNC: 31.5 G/DL (ref 31.4–37.4)
MCV RBC AUTO: 73 FL (ref 82–98)
MICROCYTES BLD QL AUTO: PRESENT
MONOCYTES # BLD AUTO: 0.12 THOUSAND/UL (ref 0–1.22)
MONOCYTES NFR BLD: 1 % (ref 4–12)
NEUTROPHILS # BLD MANUAL: 10.71 THOUSAND/UL (ref 1.85–7.62)
NEUTS SEG NFR BLD AUTO: 86 % (ref 43–75)
PLATELET # BLD AUTO: 377 THOUSANDS/UL (ref 149–390)
PLATELET BLD QL SMEAR: ADEQUATE
PMV BLD AUTO: 9.3 FL (ref 8.9–12.7)
POLYCHROMASIA BLD QL SMEAR: PRESENT
POTASSIUM SERPL-SCNC: 2.9 MMOL/L (ref 3.5–5.3)
PROT SERPL-MCNC: 7.9 G/DL (ref 6.4–8.2)
RBC # BLD AUTO: 5.11 MILLION/UL (ref 3.81–5.12)
RBC MORPH BLD: PRESENT
RSV B RNA SPEC QL NAA+PROBE: NORMAL
SODIUM SERPL-SCNC: 140 MMOL/L (ref 136–145)
TARGETS BLD QL SMEAR: PRESENT
TOTAL CELLS COUNTED SPEC: 100
WBC # BLD AUTO: 12.45 THOUSAND/UL (ref 4.31–10.16)

## 2018-03-07 PROCEDURE — 85027 COMPLETE CBC AUTOMATED: CPT | Performed by: HOSPITALIST

## 2018-03-07 PROCEDURE — 94660 CPAP INITIATION&MGMT: CPT

## 2018-03-07 PROCEDURE — 99232 SBSQ HOSP IP/OBS MODERATE 35: CPT | Performed by: INTERNAL MEDICINE

## 2018-03-07 PROCEDURE — 71045 X-RAY EXAM CHEST 1 VIEW: CPT

## 2018-03-07 PROCEDURE — 80053 COMPREHEN METABOLIC PANEL: CPT | Performed by: HOSPITALIST

## 2018-03-07 PROCEDURE — 85730 THROMBOPLASTIN TIME PARTIAL: CPT | Performed by: NURSE PRACTITIONER

## 2018-03-07 PROCEDURE — 85007 BL SMEAR W/DIFF WBC COUNT: CPT | Performed by: HOSPITALIST

## 2018-03-07 PROCEDURE — 94760 N-INVAS EAR/PLS OXIMETRY 1: CPT

## 2018-03-07 PROCEDURE — 99233 SBSQ HOSP IP/OBS HIGH 50: CPT | Performed by: HOSPITALIST

## 2018-03-07 PROCEDURE — 99254 IP/OBS CNSLTJ NEW/EST MOD 60: CPT | Performed by: PSYCHIATRY & NEUROLOGY

## 2018-03-07 RX ORDER — POTASSIUM CHLORIDE 20 MEQ/1
40 TABLET, EXTENDED RELEASE ORAL EVERY 4 HOURS
Status: COMPLETED | OUTPATIENT
Start: 2018-03-07 | End: 2018-03-07

## 2018-03-07 RX ORDER — HYDROXYZINE HYDROCHLORIDE 25 MG/1
50 TABLET, FILM COATED ORAL EVERY 6 HOURS PRN
Status: DISCONTINUED | OUTPATIENT
Start: 2018-03-07 | End: 2018-03-10 | Stop reason: HOSPADM

## 2018-03-07 RX ORDER — METHYLPREDNISOLONE SODIUM SUCCINATE 40 MG/ML
40 INJECTION, POWDER, LYOPHILIZED, FOR SOLUTION INTRAMUSCULAR; INTRAVENOUS EVERY 8 HOURS SCHEDULED
Status: DISCONTINUED | OUTPATIENT
Start: 2018-03-07 | End: 2018-03-09

## 2018-03-07 RX ORDER — LORAZEPAM 2 MG/ML
0.5 INJECTION INTRAMUSCULAR EVERY 6 HOURS PRN
Status: DISCONTINUED | OUTPATIENT
Start: 2018-03-07 | End: 2018-03-09

## 2018-03-07 RX ORDER — LORAZEPAM 2 MG/ML
0.5 INJECTION INTRAMUSCULAR EVERY 8 HOURS PRN
Status: DISCONTINUED | OUTPATIENT
Start: 2018-03-07 | End: 2018-03-07

## 2018-03-07 RX ADMIN — LORAZEPAM 0.5 MG: 2 INJECTION INTRAMUSCULAR; INTRAVENOUS at 20:24

## 2018-03-07 RX ADMIN — BACLOFEN 20 MG: 10 TABLET ORAL at 09:40

## 2018-03-07 RX ADMIN — BUSPIRONE HYDROCHLORIDE 15 MG: 15 TABLET ORAL at 10:06

## 2018-03-07 RX ADMIN — GABAPENTIN 800 MG: 400 CAPSULE ORAL at 23:01

## 2018-03-07 RX ADMIN — METHYLPREDNISOLONE SODIUM SUCCINATE 40 MG: 40 INJECTION, POWDER, FOR SOLUTION INTRAMUSCULAR; INTRAVENOUS at 00:58

## 2018-03-07 RX ADMIN — POTASSIUM CHLORIDE 40 MEQ: 1500 TABLET, EXTENDED RELEASE ORAL at 12:33

## 2018-03-07 RX ADMIN — DULOXETINE HYDROCHLORIDE 30 MG: 30 CAPSULE, DELAYED RELEASE ORAL at 09:40

## 2018-03-07 RX ADMIN — MIRTAZAPINE 30 MG: 15 TABLET, FILM COATED ORAL at 23:01

## 2018-03-07 RX ADMIN — APIXABAN 10 MG: 5 TABLET, FILM COATED ORAL at 09:40

## 2018-03-07 RX ADMIN — LEVOFLOXACIN 750 MG: 500 TABLET, FILM COATED ORAL at 12:30

## 2018-03-07 RX ADMIN — GABAPENTIN 800 MG: 400 CAPSULE ORAL at 09:40

## 2018-03-07 RX ADMIN — HEPARIN SODIUM 3600 UNITS: 1000 INJECTION, SOLUTION INTRAVENOUS; SUBCUTANEOUS at 01:05

## 2018-03-07 RX ADMIN — METHYLPREDNISOLONE SODIUM SUCCINATE 40 MG: 40 INJECTION, POWDER, FOR SOLUTION INTRAMUSCULAR; INTRAVENOUS at 17:19

## 2018-03-07 RX ADMIN — GABAPENTIN 800 MG: 400 CAPSULE ORAL at 12:30

## 2018-03-07 RX ADMIN — POTASSIUM CHLORIDE 40 MEQ: 1500 TABLET, EXTENDED RELEASE ORAL at 15:19

## 2018-03-07 RX ADMIN — QUETIAPINE FUMARATE 50 MG: 25 TABLET ORAL at 23:01

## 2018-03-07 RX ADMIN — BACLOFEN 20 MG: 10 TABLET ORAL at 17:19

## 2018-03-07 RX ADMIN — LOPERAMIDE HYDROCHLORIDE 2 MG: 2 CAPSULE ORAL at 15:31

## 2018-03-07 RX ADMIN — METHYLPREDNISOLONE SODIUM SUCCINATE 40 MG: 40 INJECTION, POWDER, FOR SOLUTION INTRAMUSCULAR; INTRAVENOUS at 05:51

## 2018-03-07 RX ADMIN — APIXABAN 10 MG: 5 TABLET, FILM COATED ORAL at 17:19

## 2018-03-07 RX ADMIN — METHYLPREDNISOLONE SODIUM SUCCINATE 40 MG: 40 INJECTION, POWDER, FOR SOLUTION INTRAMUSCULAR; INTRAVENOUS at 23:01

## 2018-03-07 RX ADMIN — NICOTINE 1 PATCH: 14 PATCH, EXTENDED RELEASE TRANSDERMAL at 09:40

## 2018-03-07 RX ADMIN — METHYLPREDNISOLONE SODIUM SUCCINATE 40 MG: 40 INJECTION, POWDER, FOR SOLUTION INTRAMUSCULAR; INTRAVENOUS at 12:30

## 2018-03-07 RX ADMIN — DOCUSATE SODIUM 100 MG: 100 CAPSULE, LIQUID FILLED ORAL at 17:19

## 2018-03-07 RX ADMIN — LOPERAMIDE HYDROCHLORIDE 2 MG: 2 CAPSULE ORAL at 09:38

## 2018-03-07 RX ADMIN — HEPARIN SODIUM AND DEXTROSE 17 UNITS/KG/HR: 10000; 5 INJECTION INTRAVENOUS at 05:51

## 2018-03-07 RX ADMIN — PANTOPRAZOLE SODIUM 40 MG: 40 TABLET, DELAYED RELEASE ORAL at 06:02

## 2018-03-07 RX ADMIN — VENLAFAXINE HYDROCHLORIDE 150 MG: 150 CAPSULE, EXTENDED RELEASE ORAL at 10:06

## 2018-03-07 RX ADMIN — GABAPENTIN 800 MG: 400 CAPSULE ORAL at 17:19

## 2018-03-07 NOTE — ASSESSMENT & PLAN NOTE
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

## 2018-03-07 NOTE — CONSULTS
Consultation - [address] 38 y o  female MRN: [id number]  Unit/Bed#: -02 Encounter: [id number]        Assessment/Plan         Assessment:  Mood disorder     Plan:   1  Discontinue Cymbalta, Effexor, and Buspar due to increased risk of bleeding with concurrently taking Eliquis  2  Continue Gabapentin 800mg QID and make Atarax 50mg q6h PRN for anxiety  Additionally add Ativan   5mg q6h PRN IV for severe anxiety  3  Continue Remeron 30mg HS for insomnia/depression  4  Continue Seroquel 50mg HS for insomnia  5  Patient does not meet criteria for inpatient admission  6  Patient should be referred to outpatient psychiatrist     Risks, benefits and possible side effects of Medications:   Risks, benefits, and possible side effects of medications explained to patient and patient verbalizes understanding        Chief Complaint: "I'm anxious and depressed"        History of Present Illness     Physician Requesting Consult: Kiel Rowell MD  Reason for Consult / Principal Problem: "Anxiety"     Patient currently admitted on ICU for acute respiratory failure due to pulmonary embolism  Patient does not have current outpatient psychiatrist and is being managed by her PCP for depression and anxiety  States she was not entirely compliant with her medications and take her antidepressants not daily  Reports she is unsure what medications she was taking  Was on 3 anti-anxiolytics and still reports excessive anxiety  Reports it as more situational and due to relationship  Reports panic attacks and racing thoughts  Does not appear overly anxious during interview  Patient also reports depressive symptoms of poor sleep, lack of appetite, lack of energy, anhedonia, and denied suicidal ideation    Denied psychosis and delusional material   Does not endorse criteria for madeleine        Psychosocial Stressors: health and relationship      Consults     Psychiatric Review Of Systems:  sleep: yes  appetite changes: yes  weight changes: yes  energy/anergy: yes  interest/pleasure/anhedonia: yes  somatic symptoms: no  anxiety/panic: yes  madeleine: no  guilty/hopeless: no  self injurious behavior/risky behavior: no        Historical Information     Past Psychiatric History:   2 prior inpatient psychiatric conditions  Currently in treatment with PCP  Past Suicide attempts: denied  Past Violent behavior: denied  Past Psychiatric medication trial: multiple and unknown     Substance Abuse History:  denied  Use of Alcohol: denied    Smoking history: 1/2 pack per day  Use of Caffeine: denies use     Family Psychiatric History:   Son - ADHD     Social History  Education: 10th grade  Learning Disabilities: no  Marital history: , PFA with   Living arrangement, social support: Poor living arrangement    Occupational History: disabled  Functioning Relationships: good relationship with 4 kids  Other Pertinent History: None     Traumatic History:   Abuse: denied  Other Traumatic Events: denied     Medical History        Past Medical History:   Diagnosis Date    Arthritis 10/23/2015    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    Chronic pain      Closed dislocation of tarsometatarsal joint 2/3/2015    Closed fracture of multiple ribs 3/25/2017    Closed fracture of phalanx of foot 3/25/2017    Closed fracture of tarsal and metatarsal bones 3/25/2017    Complex regional pain syndrome type 1 of lower extremity 4/12/2016    Depression      History of pulmonary embolism 3/25/2017    Iron deficiency anemia      Pulmonary embolism (HCC)      [facility]ures [facility] )      Treatment of healed fracture follow-up examination 3/25/2017            Medical Review Of Systems:  Review of Systems        Meds/Allergies     all current active meds have been reviewed and current meds:   Current Medications          Current Facility-Administered Medications   Medication Dose Route Frequency    albuterol inhalation solution 2 5 mg 2 5 mg Nebulization Q4H PRN     And    sodium chloride 0 9 % inhalation solution 3 mL  3 mL Nebulization Q4H PRN    apixaban (ELIQUIS) tablet 10 mg  10 mg Oral BID    baclofen tablet 20 mg  20 mg Oral BID    calcium carbonate (TUMS) chewable tablet 1,000 mg  1,000 mg Oral Daily PRN    docusate sodium (COLACE) capsule 100 mg  100 mg Oral BID    gabapentin (NEURONTIN) capsule 800 mg  800 mg Oral 4x Daily    hydrOXYzine HCL (ATARAX) tablet 50 mg  50 mg Oral Q6H PRN    iohexol (OMNIPAQUE) 350 MG/ML injection (MULTI-DOSE) 100 mL  100 mL Intravenous Once in imaging    levofloxacin (LEVAQUIN) tablet 750 mg  750 mg Oral Q24H    loperamide (IMODIUM) capsule 2 mg  2 mg Oral Q4H PRN    LORazepam (ATIVAN) 2 mg/mL injection 0 5 mg  0 5 mg Intravenous Q6H PRN    methylPREDNISolone sodium succinate (Solu-MEDROL) injection 40 mg  40 mg Intravenous Q6H [facility]    mirtazapine (REMERON) tablet 30 mg  30 mg Oral HS    nicotine (NICODERM CQ) 14 mg/24hr TD 24 hr patch 1 patch  1 patch Transdermal Daily    pantoprazole (PROTONIX) EC tablet 40 mg  40 mg Oral Daily    potassium chloride (K-DUR,KLOR-CON) CR tablet 40 mEq  40 mEq Oral Q4H    QUEtiapine (SEROquel) tablet 50 mg  50 mg Oral HS    rOPINIRole (REQUIP) tablet 8 mg  8 mg Oral HS    temazepam (RESTORIL) capsule 30 mg  30 mg Oral HS PRN               Allergies   Allergen Reactions    Asa [Aspirin] Swelling    Nsaids         Due to gastric bypass surgery    Ceclor [Cefaclor] Rash            Objective      Vital signs in last 24 hours:  Temp:  [97 5 °F (36 4 °C)-98 8 °F (37 1 °C)] 98 4 °F (36 9 °C)  HR:  [] 103  Resp:  [16-29] 20  BP: (108-143)/(59-77) 143/70  FiO2 (%):  [50] 50        Intake/Output Summary (Last 24 hours) at 03/07/18 1252  Last data filed at 03/07/18 0551    Gross per 24 hour   Intake           315 62 ml   Output             2050 ml   Net         -1734 38 ml         Mental Status Evaluation:  Appearance:  in hospital attire   Behavior:  guarded Speech:  soft   Mood:  anxious and depressed   Affect:  constricted   Language: appropriate   Thought Process:  goal directed and linear   Thought Content:  normal  Denied delusions/obsessions   Perceptual Disturbances: None   Risk Potential: Denied SI/HI   Potential for aggression: No   Sensorium:  person, place and time/date   Cognition:  grossly intact   Consciousness:  alert and awake    Attention: attention span and concentration were age appropriate   Intellect: normal   Fund of Knowledge: awareness of current events: yes   Insight:  partial   Judgment: fair   Gait/Station: did not assess   Motor Activity: no abnormal movements      Lab Results: reviewed  Imaging Studies: reviewed  EKG, Pathology, and Other Studies: reviewed     Code Status: Level 1 - Full Code     Cheng Benjamin PA-C

## 2018-03-07 NOTE — SOCIAL WORK
Met with patient  Discussed role of care manager  Patient lives in a 2sh with mother, brother and kids  Patient informed her fiance(Francisco: [phone]) and family are support for her  Pt is independent prior to admission but has cane and wheelchair if needed  Pt goes to [facility] for her pharmacy  Pt has not had any home care in past  Will follow   MONSE Chau

## 2018-03-07 NOTE — PROGRESS NOTES
Progress Note - Romayne Flack 1975, 38 y o  female MRN: [id number]    Unit/Bed#: -02 Encounter: [id number]    Primary Care Provider: Velasquez Sandoval MD   Date and time admitted to hospital: 3/6/2018  6:10 AM        Acute respiratory failure with hypoxia [facility])   Assessment & Plan    -Multifactorial and due to pulmonary embolism,  COPD exacerbation, and possibly acute interstitial pneumonitis  -cont Solu-Medrol/Levaquin   -appreciate pulmonary  -repeat CXR today  -received one dose of 40 mg of IV Lasix on 3/6/2018 for acute pulmonary edema thought to be due to 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  Currently no indication for further Lasix  -unclear if patient has chronic hypoxemic respiratory failure at this time  She was not on oxygen prior to admission but it is in the realm of possibility that she may require oxygen on discharge due to underlying COPD  Chronic obstructive pulmonary disease with acute exacerbation (HCC)   Assessment & Plan    -cont Solu-Medrol/ Levaquin  -now saturating well on 2 L nasal cannular oxygen        Epilepsy ([facility] )   Assessment & Plan    No seizure activity noted, continue home meds        Current smoker   Assessment & Plan    -patient counseled for 5 minutes on smoking cessation  -continue nicotine patch        Anxiety   Assessment & Plan    -continue Seroquel, Effexor, BuSpar, Atarax, Remeron        * Acute pulmonary embolism (HCC)   Assessment & Plan    -CT:  Acute right lower lobe segmental and subsegmental pulmonary emboli   No evidence of RV strain based on RV/LV ratio   -stop heparin drip and start Eliquis  -Patient reportedly stopped her NOAC because she did not want to take so many medications approximately 1 year ago  -appreciate Pulmonary          VTE Pharmacologic Prophylaxis:   Pharmacologic: Apixaban (Eliquis)  Mechanical VTE Prophylaxis in Place: Yes    Patient Centered Rounds: I have performed bedside rounds with nursing staff today  Education and Discussions with Family / Patient:  Patient    Time Spent for Care: 20 minutes  More than 50% of total time spent on counseling and coordination of care as described above  Current Length of Stay: 1 day(s)    Current Patient Status: Inpatient   Certification Statement: The patient will continue to require additional inpatient hospital stay due to Acute hypoxemic respiratory failure    Discharge Plan: 2-3 days    Code Status: Level 1 - Full Code      Subjective:   SOB significantly improved  Objective:     Vitals:   Temp (24hrs), Av 2 °F (36 8 °C), Min:97 5 °F (36 4 °C), Max:99 1 °F (37 3 °C)    HR:  [] 84  Resp:  [14-29] 29  BP: (108-127)/(57-77) 121/68  SpO2:  [82 %-100 %] 100 %  Body mass index is 32 88 kg/m²  Input and Output Summary (last 24 hours):        Intake/Output Summary (Last 24 hours) at 18 0909  Last data filed at 18 0551   Gross per 24 hour   Intake           315 62 ml   Output             2050 ml   Net         -1734 38 ml       Physical Exam:     Physical Exam  Gen: NAD, AAOx3, well developed, well nourished  Eyes: EOMI, PERRLA, no scleral icterus  ENMT:  Oropharynx clear of erythema or exudates, no nasal discharge, no otic discharge, moist mucous membranes  Neck:  Supple  Lymph:  No anterior or posterior cervical or supraclavicular lymphadenopathy  Cardiovascular:  Regular rate and rhythm, normal S1-S2, no murmurs, rubs, or gallops  Lungs:  Clear to auscultation bilaterally, no wheezes, or rales, or rhonchi  Abdomen:  Positive bowel sounds, soft, nontender, nondistended, no palpable organomegaly   Skin:  Intact, no obvious lesions or rashes, no edema  Neuro: Cranial nerves 2-12 are intact, non-focal, 5/5 strength in all 4 extremities      Additional Data:     Labs:      Results from last 7 days  Lab Units 18  0620   WBC Thousand/uL 20 80*   HEMOGLOBIN g/dL 10 3*   HEMATOCRIT % 33 6*   PLATELETS Thousands/uL 341 NEUTROS PCT % 78*   LYMPHS PCT % 15   MONOS PCT % 6   EOS PCT % 1       Results from last 7 days  Lab Units 03/06/18  0620   SODIUM mmol/L 138   POTASSIUM mmol/L 3 6   CHLORIDE mmol/L 101   CO2 mmol/L 24   BUN mg/dL 9   CREATININE mg/dL 0 76   CALCIUM mg/dL 7 7*   TOTAL PROTEIN g/dL 6 6   BILIRUBIN TOTAL mg/dL 0 40   ALK PHOS U/L 137*   ALT U/L 19   AST U/L 18   GLUCOSE RANDOM mg/dL 102       Results from last 7 days  Lab Units 03/06/18  0620   INR  1 04       * I Have Reviewed All Lab Data Listed Above  * Additional Pertinent Lab Tests Reviewed:  [address] [address] Admission Reviewed    Recent Cultures (last 7 days):       Results from last 7 days  Lab Units 03/06/18  1604   INFLUENZA A PCR  None Detected   INFLUENZA B PCR  None Detected   RSV PCR  None Detected       Last 24 Hours Medication List:     Current Facility-Administered Medications:  albuterol 2 5 mg Nebulization Q4H PRN [facility], PA-C   And       sodium chloride 3 mL Nebulization Q4H PRN [facility], PA-C   apixaban 10 mg Oral BID Geri Elias MD   baclofen 20 mg Oral BID [facility], PA-C   busPIRone 15 mg Oral TID Karma Million, PA-C   calcium carbonate 1,000 mg Oral Daily PRN Karma Million, PA-C   docusate sodium 100 mg Oral BID Karma Million, PA-C   DULoxetine 30 mg Oral Daily Karma Million, PA-C   gabapentin 800 mg Oral 4x Daily [facility], PA-C   hydrOXYzine HCL 50 mg Oral HS Karma Million, PA-C   iohexol 100 mL Intravenous Once in imaging Selam Garcia MD   levofloxacin 750 mg Oral Q24H Karma Million, PA-C   loperamide 2 mg Oral Q4H PRN Karma Million, PA-C   methylPREDNISolone sodium succinate 40 mg Intravenous Q6H [address] Karma Million, PA-C   mirtazapine 30 mg Oral HS Karma Million, PA-C   nicotine 1 patch Transdermal Daily Karma Million, PA-C   pantoprazole 40 mg Oral Daily Karma Million, PA-C   QUEtiapine 50 mg Oral HS [place] ARIANNA Esquivel   rOPINIRole 8 mg Oral HS [place] ARIANNA Esquivel   temazepam 30 mg Oral HS PRN [place] ARIANNA Esquivel   venlafaxine 150 mg Oral Daily [place] ARIANNA Esquivel        Today, Patient Was Seen By: Deisy Lawson MD    ** Please Note: Dictation voice to text software may have been used in the creation of this document   **

## 2018-03-07 NOTE — PROGRESS NOTES
Consultation - [address] 38 y o  female MRN: [id number]  Unit/Bed#: -02 Encounter: [id number]    Assessment/Plan     Assessment:  Mood disorder    Plan:   1  Discontinue Cymbalta, Effexor, and Buspar due to increased risk of bleeding with concurrently taking Eliquis  2  Continue Gabapentin 800mg QID and make Atarax 50mg q6h PRN for anxiety  Additionally add Ativan   5mg q6h PRN IV for severe anxiety  3  Continue Remeron 30mg HS for insomnia/depression  4  Continue Seroquel 50mg HS for insomnia  5  Patient does not meet criteria for inpatient admission  6  Patient should be referred to outpatient psychiatrist    Risks, benefits and possible side effects of Medications:   Risks, benefits, and possible side effects of medications explained to patient and patient verbalizes understanding  Chief Complaint: "I'm anxious and depressed"    History of Present Illness   Physician Requesting Consult: Gato Wright MD  Reason for Consult / Principal Problem: "Anxiety"    Patient currently admitted on ICU for acute respiratory failure due to pulmonary embolism  Patient does not have current outpatient psychiatrist and is being managed by her PCP for depression and anxiety  States she was not entirely compliant with her medications and take her antidepressants not daily  Reports she is unsure what medications she was taking  Was on 3 anti-anxiolytics and still reports excessive anxiety  Reports it as more situational and due to relationship  Reports panic attacks and racing thoughts  Does not appear overly anxious during interview  Patient also reports depressive symptoms of poor sleep, lack of appetite, lack of energy, anhedonia, and denied suicidal ideation  Denied psychosis and delusional material   Does not endorse criteria for madeleine  Psychosocial Stressors: health and relationship      Consults    Psychiatric Review Of Systems:  sleep: yes  appetite changes: yes  weight changes: yes  energy/anergy: yes  interest/pleasure/anhedonia: yes  somatic symptoms: no  anxiety/panic: yes  madeleine: no  guilty/hopeless: no  self injurious behavior/risky behavior: no    Historical Information   Past Psychiatric History:   2 prior inpatient psychiatric conditions  Currently in treatment with PCP  Past Suicide attempts: denied  Past Violent behavior: denied  Past Psychiatric medication trial: multiple and unknown    Substance Abuse History:  denied  Use of Alcohol: denied    Smoking history: 1/2 pack per day  Use of Caffeine: denies use    Family Psychiatric History:   Son - ADHD    Social History  Education: 10th grade  Learning Disabilities: no  Marital history: , PFA with   Living arrangement, social support: Poor living arrangement    Occupational History: disabled  Functioning Relationships: good relationship with 4 kids  Other Pertinent History: None    Traumatic History:   Abuse: denied  Other Traumatic Events: denied    Past Medical History:   Diagnosis Date    Arthritis 10/23/2015    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    Chronic pain     Closed dislocation of tarsometatarsal joint 2/3/2015    Closed fracture of multiple ribs 3/25/2017    Closed fracture of phalanx of foot 3/25/2017    Closed fracture of tarsal and metatarsal bones 3/25/2017    Complex regional pain syndrome type 1 of lower extremity 4/12/2016    Depression     History of pulmonary embolism 3/25/2017    Iron deficiency anemia     Pulmonary embolism (HCC)     Se[facility] )     Treatment of healed fracture follow-up examination 3/25/2017       Medical Review Of Systems:  Review of Systems    Meds/Allergies   all current active meds have been reviewed and current meds:   Current Facility-Administered Medications   Medication Dose Route Frequency    albuterol inhalation solution 2 5 mg  2 5 mg Nebulization Q4H PRN    And    sodium chloride 0 9 % inhalation solution 3 mL  3 mL Nebulization Q4H PRN    apixaban (ELIQUIS) tablet 10 mg  10 mg Oral BID    baclofen tablet 20 mg  20 mg Oral BID    calcium carbonate (TUMS) chewable tablet 1,000 mg  1,000 mg Oral Daily PRN    docusate sodium (COLACE) capsule 100 mg  100 mg Oral BID    gabapentin (NEURONTIN) capsule 800 mg  800 mg Oral 4x Daily    hydrOXYzine HCL (ATARAX) tablet 50 mg  50 mg Oral Q6H PRN    iohexol (OMNIPAQUE) 350 MG/ML injection (MULTI-DOSE) 100 mL  100 mL Intravenous Once in imaging    levofloxacin (LEVAQUIN) tablet 750 mg  750 mg Oral Q24H    loperamide (IMODIUM) capsule 2 mg  2 mg Oral Q4H PRN    LORazepam (ATIVAN) 2 mg/mL injection 0 5 mg  0 5 mg Intravenous Q6H PRN    methylPREDNISolone sodium succinate (Solu-MEDROL) injection 40 mg  40 mg Intravenous Q6H [address]    mirtazapine (REMERON) tablet 30 mg  30 mg Oral HS    nicotine (NICODERM CQ) 14 mg/24hr TD 24 hr patch 1 patch  1 patch Transdermal Daily    pantoprazole (PROTONIX) EC tablet 40 mg  40 mg Oral Daily    potassium chloride (K-DUR,KLOR-CON) CR tablet 40 mEq  40 mEq Oral Q4H    QUEtiapine (SEROquel) tablet 50 mg  50 mg Oral HS    rOPINIRole (REQUIP) tablet 8 mg  8 mg Oral HS    temazepam (RESTORIL) capsule 30 mg  30 mg Oral HS PRN     Allergies   Allergen Reactions    Asa [Aspirin] Swelling    Nsaids      Due to gastric bypass surgery    Ceclor [Cefaclor] Rash       Objective   Vital signs in last 24 hours:  Temp:  [97 5 °F (36 4 °C)-98 8 °F (37 1 °C)] 98 4 °F (36 9 °C)  HR:  [] 103  Resp:  [16-29] 20  BP: (108-143)/(59-77) 143/70  FiO2 (%):  [50] 50      Intake/Output Summary (Last 24 hours) at 03/07/18 1252  Last data filed at 03/07/18 0551   Gross per 24 hour   Intake           315 62 ml   Output             2050 ml   Net         -1734 38 ml       Mental Status Evaluation:  Appearance:  in hospital attire   Behavior:  guarded   Speech:  soft   Mood:  anxious and depressed   Affect:  constricted   Language: appropriate   Thought Process:  goal directed and linear   Thought Content:  normal  Denied delusions/obsessions   Perceptual Disturbances: None   Risk Potential: Denied SI/HI   Potential for aggression: No   Sensorium:  person, place and time/date   Cognition:  grossly intact   Consciousness:  alert and awake    Attention: attention span and concentration were age appropriate   Intellect: normal   Fund of Knowledge: awareness of current events: yes   Insight:  partial   Judgment: fair   Gait/Station: did not assess   Motor Activity: no abnormal movements     Lab Results: reviewed  Imaging Studies: reviewed  EKG, Pathology, and Other Studies: reviewed    Code Status: Level 1 - Full Code    Cris Hoskins PA-C

## 2018-03-07 NOTE — PROGRESS NOTES
Pt  Found by staff half naked in the bed, almost skilled nursing hanging off the siderail with legs wide open and bipap mask off and incontinent of stools  Pt  Drowsy, arousable but drifts off to sleep easily  This RN with assistance of other nursing staff in room to clean the patient  Pt's  found sleeping on three regular chair put together and unresponsive when this RN requested for him to wait in waiting room so nurses can clean/wash pt  Up  Pt  Cursed " fuck off"  To fellow RN upon repeated request to give privacy to patient   became apparently severely aggressive with wife as well upon wife's request to leave the room so she can get cleaned in privacy   stated to pt " You fuck off too  Am not going anywhere"  Nursing supervisor and security made aware  Security in room and escorted  out of room  Discovered a big medical silver steel? Box in room full of pt's medication bottles, and bunch of various kind of knives  Nursing supervisor and security notified  Knives deposited with security  Pt  Aware  Med's box given to pharmacy  Manager Chantell Christianson aware

## 2018-03-07 NOTE — ASSESSMENT & PLAN NOTE
-CT:  Acute right lower lobe segmental and subsegmental pulmonary emboli   No evidence of RV strain based on RV/LV ratio   -stop heparin drip and start Eliquis  -Patient reportedly stopped her NOAC because she did not want to take so many medications approximately 1 year ago  -appreciate Pulmonary

## 2018-03-07 NOTE — PLAN OF CARE
DISCHARGE PLANNING     Discharge to home or other facility with appropriate resources Progressing        Knowledge Deficit     Patient/family/caregiver demonstrates understanding of disease process, treatment plan, medications, and discharge instructions Progressing        PAIN - ADULT     Verbalizes/displays adequate comfort level or baseline comfort level Progressing        Potential for Falls     Patient will remain free of falls Progressing        Prexisting or High Potential for Compromised Skin Integrity     Skin integrity is maintained or improved Progressing        RESPIRATORY - ADULT     Achieves optimal ventilation and oxygenation Progressing        SAFETY ADULT     Maintain or return to baseline ADL function Progressing     Maintain or return mobility status to optimal level Progressing

## 2018-03-08 LAB
ANION GAP SERPL CALCULATED.3IONS-SCNC: 9 MMOL/L (ref 4–13)
ANISOCYTOSIS BLD QL SMEAR: PRESENT
BASOPHILS # BLD MANUAL: 0 THOUSAND/UL (ref 0–0.1)
BASOPHILS NFR MAR MANUAL: 0 % (ref 0–1)
BUN SERPL-MCNC: 8 MG/DL (ref 5–25)
CALCIUM SERPL-MCNC: 9 MG/DL (ref 8.3–10.1)
CHLORIDE SERPL-SCNC: 107 MMOL/L (ref 100–108)
CO2 SERPL-SCNC: 25 MMOL/L (ref 21–32)
CREAT SERPL-MCNC: 0.58 MG/DL (ref 0.6–1.3)
DACRYOCYTES BLD QL SMEAR: PRESENT
EOSINOPHIL # BLD MANUAL: 0 THOUSAND/UL (ref 0–0.4)
EOSINOPHIL NFR BLD MANUAL: 0 % (ref 0–6)
ERYTHROCYTE [DISTWIDTH] IN BLOOD BY AUTOMATED COUNT: 18.7 % (ref 11.6–15.1)
GFR SERPL CREATININE-BSD FRML MDRD: 116 ML/MIN/1.73SQ M
GLUCOSE SERPL-MCNC: 117 MG/DL (ref 65–140)
HCT VFR BLD AUTO: 33.7 % (ref 34.8–46.1)
HGB BLD-MCNC: 10.3 G/DL (ref 11.5–15.4)
HYPERCHROMIA BLD QL SMEAR: PRESENT
LYMPHOCYTES # BLD AUTO: 0.78 THOUSAND/UL (ref 0.6–4.47)
LYMPHOCYTES # BLD AUTO: 5 % (ref 14–44)
MCH RBC QN AUTO: 22.6 PG (ref 26.8–34.3)
MCHC RBC AUTO-ENTMCNC: 30.6 G/DL (ref 31.4–37.4)
MCV RBC AUTO: 74 FL (ref 82–98)
MICROCYTES BLD QL AUTO: PRESENT
MONOCYTES # BLD AUTO: 0.31 THOUSAND/UL (ref 0–1.22)
MONOCYTES NFR BLD: 2 % (ref 4–12)
NEUTROPHILS # BLD MANUAL: 14.56 THOUSAND/UL (ref 1.85–7.62)
NEUTS SEG NFR BLD AUTO: 93 % (ref 43–75)
PLATELET # BLD AUTO: 381 THOUSANDS/UL (ref 149–390)
PLATELET BLD QL SMEAR: ADEQUATE
PMV BLD AUTO: 9.5 FL (ref 8.9–12.7)
POIKILOCYTOSIS BLD QL SMEAR: PRESENT
POLYCHROMASIA BLD QL SMEAR: PRESENT
POTASSIUM SERPL-SCNC: 4 MMOL/L (ref 3.5–5.3)
RBC # BLD AUTO: 4.55 MILLION/UL (ref 3.81–5.12)
RBC MORPH BLD: PRESENT
SODIUM SERPL-SCNC: 141 MMOL/L (ref 136–145)
TARGETS BLD QL SMEAR: PRESENT
TOTAL CELLS COUNTED SPEC: 100
WBC # BLD AUTO: 15.66 THOUSAND/UL (ref 4.31–10.16)

## 2018-03-08 PROCEDURE — 85007 BL SMEAR W/DIFF WBC COUNT: CPT | Performed by: HOSPITALIST

## 2018-03-08 PROCEDURE — 99232 SBSQ HOSP IP/OBS MODERATE 35: CPT | Performed by: INTERNAL MEDICINE

## 2018-03-08 PROCEDURE — 99233 SBSQ HOSP IP/OBS HIGH 50: CPT | Performed by: HOSPITALIST

## 2018-03-08 PROCEDURE — 85027 COMPLETE CBC AUTOMATED: CPT | Performed by: HOSPITALIST

## 2018-03-08 PROCEDURE — 94664 DEMO&/EVAL PT USE INHALER: CPT

## 2018-03-08 PROCEDURE — 80048 BASIC METABOLIC PNL TOTAL CA: CPT | Performed by: HOSPITALIST

## 2018-03-08 PROCEDURE — 94760 N-INVAS EAR/PLS OXIMETRY 1: CPT

## 2018-03-08 RX ORDER — FUROSEMIDE 10 MG/ML
40 INJECTION INTRAMUSCULAR; INTRAVENOUS ONCE
Status: COMPLETED | OUTPATIENT
Start: 2018-03-08 | End: 2018-03-08

## 2018-03-08 RX ORDER — HYDROCODONE BITARTRATE AND ACETAMINOPHEN 5; 325 MG/1; MG/1
1 TABLET ORAL EVERY 6 HOURS PRN
Status: DISCONTINUED | OUTPATIENT
Start: 2018-03-08 | End: 2018-03-10 | Stop reason: HOSPADM

## 2018-03-08 RX ADMIN — HYDROCODONE BITARTRATE AND ACETAMINOPHEN 1 TABLET: 5; 325 TABLET ORAL at 20:55

## 2018-03-08 RX ADMIN — METHYLPREDNISOLONE SODIUM SUCCINATE 40 MG: 40 INJECTION, POWDER, FOR SOLUTION INTRAMUSCULAR; INTRAVENOUS at 13:30

## 2018-03-08 RX ADMIN — NICOTINE 1 PATCH: 14 PATCH, EXTENDED RELEASE TRANSDERMAL at 09:28

## 2018-03-08 RX ADMIN — LEVOFLOXACIN 750 MG: 500 TABLET, FILM COATED ORAL at 12:20

## 2018-03-08 RX ADMIN — APIXABAN 10 MG: 5 TABLET, FILM COATED ORAL at 18:50

## 2018-03-08 RX ADMIN — METHYLPREDNISOLONE SODIUM SUCCINATE 40 MG: 40 INJECTION, POWDER, FOR SOLUTION INTRAMUSCULAR; INTRAVENOUS at 22:16

## 2018-03-08 RX ADMIN — GABAPENTIN 800 MG: 400 CAPSULE ORAL at 09:28

## 2018-03-08 RX ADMIN — FUROSEMIDE 40 MG: 10 INJECTION, SOLUTION INTRAMUSCULAR; INTRAVENOUS at 12:20

## 2018-03-08 RX ADMIN — BACLOFEN 20 MG: 10 TABLET ORAL at 18:50

## 2018-03-08 RX ADMIN — QUETIAPINE FUMARATE 50 MG: 25 TABLET ORAL at 22:16

## 2018-03-08 RX ADMIN — BACLOFEN 20 MG: 10 TABLET ORAL at 09:28

## 2018-03-08 RX ADMIN — MIRTAZAPINE 30 MG: 15 TABLET, FILM COATED ORAL at 22:16

## 2018-03-08 RX ADMIN — GABAPENTIN 800 MG: 400 CAPSULE ORAL at 12:20

## 2018-03-08 RX ADMIN — GABAPENTIN 800 MG: 400 CAPSULE ORAL at 18:50

## 2018-03-08 RX ADMIN — ROPINIROLE 8 MG: 2 TABLET, FILM COATED ORAL at 22:16

## 2018-03-08 RX ADMIN — GABAPENTIN 800 MG: 400 CAPSULE ORAL at 22:16

## 2018-03-08 RX ADMIN — APIXABAN 10 MG: 5 TABLET, FILM COATED ORAL at 09:28

## 2018-03-08 RX ADMIN — METHYLPREDNISOLONE SODIUM SUCCINATE 40 MG: 40 INJECTION, POWDER, FOR SOLUTION INTRAMUSCULAR; INTRAVENOUS at 05:59

## 2018-03-08 RX ADMIN — LORAZEPAM 0.5 MG: 2 INJECTION INTRAMUSCULAR; INTRAVENOUS at 11:07

## 2018-03-08 RX ADMIN — DOCUSATE SODIUM 100 MG: 100 CAPSULE, LIQUID FILLED ORAL at 09:28

## 2018-03-08 RX ADMIN — PANTOPRAZOLE SODIUM 40 MG: 40 TABLET, DELAYED RELEASE ORAL at 06:00

## 2018-03-08 NOTE — ASSESSMENT & PLAN NOTE
-CT:  Acute right lower lobe segmental and subsegmental pulmonary emboli   No evidence of RV strain based on RV/LV ratio   -continue Eliquis  -Patient reportedly stopped her NOAC because she did not want to take so many medications approximately 1 year ago  -appreciate Pulmonary

## 2018-03-08 NOTE — PLAN OF CARE
Problem: Potential for Falls  Goal: Patient will remain free of falls  INTERVENTIONS:  - Assess patient frequently for physical needs  -  Identify cognitive and physical deficits and behaviors that affect risk of falls    -  [place] fall precautions as indicated by assessment   - Educate patient/family on patient safety including physical limitations  - Instruct patient to call for assistance with activity based on assessment  - Modify environment to reduce risk of injury  - Consider OT/PT consult to assist with strengthening/mobility   Outcome: Progressing      Problem: PAIN - ADULT  Goal: Verbalizes/displays adequate comfort level or baseline comfort level  Interventions:  - Encourage patient to monitor pain and request assistance  - Assess pain using appropriate pain scale  - Administer analgesics based on type and severity of pain and evaluate response  - Implement non-pharmacological measures as appropriate and evaluate response  - Consider cultural and social influences on pain and pain management  - Notify physician/advanced practitioner if interventions unsuccessful or patient reports new pain   Outcome: Progressing      Problem: SAFETY ADULT  Goal: Maintain or return to baseline ADL function  INTERVENTIONS:  -  Assess patient's ability to carry out ADLs; assess patient's baseline for ADL function and identify physical deficits which impact ability to perform ADLs (bathing, care of mouth/teeth, toileting, grooming, dressing, etc )  - Assess/evaluate cause of self-care deficits   - Assess range of motion  - Assess patient's mobility; develop plan if impaired  - Assess patient's need for assistive devices and provide as appropriate  - Encourage maximum independence but intervene and supervise when necessary  ¯ Involve family in performance of ADLs  ¯ Assess for home care needs following discharge   ¯ Request OT consult to assist with ADL evaluation and planning for discharge  ¯ Provide patient education as appropriate   Outcome: Progressing    Goal: Maintain or return mobility status to optimal level  INTERVENTIONS:  - Assess patient's baseline mobility status (ambulation, transfers, stairs, etc )    - Identify cognitive and physical deficits and behaviors that affect mobility  - Identify mobility aids required to assist with transfers and/or ambulation (gait belt, sit-to-stand, lift, walker, cane, etc )  - [place] fall precautions as indicated by assessment  - Record patient progress and toleration of activity level on Mobility SBAR; progress patient to next Phase/Stage  - Instruct patient to call for assistance with activity based on assessment  - Request Rehabilitation consult to assist with strengthening/weightbearing, etc    Outcome: Progressing      Problem: DISCHARGE PLANNING  Goal: Discharge to home or other facility with appropriate resources  INTERVENTIONS:  - Identify barriers to discharge w/patient and caregiver  - Arrange for needed discharge resources and transportation as appropriate  - Identify discharge learning needs (meds, wound care, etc )  - Arrange for interpretive services to assist at discharge as needed  - Refer to Case Management Department for coordinating discharge planning if the patient needs post-hospital services based on physician/advanced practitioner order or complex needs related to functional status, cognitive ability, or social support system   Outcome: Progressing      Problem: Knowledge Deficit  Goal: Patient/family/caregiver demonstrates understanding of disease process, treatment plan, medications, and discharge instructions  Complete learning assessment and assess knowledge base    Interventions:  - Provide teaching at level of understanding  - Provide teaching via preferred learning methods   Outcome: Progressing      Problem: Prexisting or High Potential for Compromised Skin Integrity  Goal: Skin integrity is maintained or improved  INTERVENTIONS:  - Identify patients at risk for skin breakdown  - Assess and monitor skin integrity  - Assess and monitor nutrition and hydration status  - Monitor labs (i e  albumin)  - Assess for incontinence   - Turn and reposition patient  - Assist with mobility/ambulation  - Relieve pressure over bony prominences  - Avoid friction and shearing  - Provide appropriate hygiene as needed including keeping skin clean and dry  - Evaluate need for skin moisturizer/barrier cream  - Collaborate with interdisciplinary team (i e  Nutrition, Rehabilitation, etc )   - Patient/family teaching   Outcome: Progressing      Problem: RESPIRATORY - ADULT  Goal: Achieves optimal ventilation and oxygenation  INTERVENTIONS:  - Assess for changes in respiratory status  - Assess for changes in mentation and behavior  - Position to facilitate oxygenation and minimize respiratory effort  - Oxygen administration by appropriate delivery method based on oxygen saturation (per order) or ABGs  - Initiate smoking cessation education as indicated  - Encourage broncho-pulmonary hygiene including cough, deep breathe, Incentive Spirometry  - Assess the need for suctioning and aspirate as needed  - Assess and instruct to report SOB or any respiratory difficulty  - Respiratory Therapy support as indicated   Outcome: Progressing

## 2018-03-08 NOTE — RESPIRATORY THERAPY NOTE
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    Chronic pain     Closed dislocation of tarsometatarsal joint 2/3/2015    Closed fracture of multiple ribs 3/25/2017    Closed fracture of phalanx of foot 3/25/2017    Closed fracture of tarsal and metatarsal bones 3/25/2017    Complex regional pain syndrome type 1 of lower extremity 4/12/2016    Depression     History of pulmonary embolism 3/25/2017    Iron deficiency anemia     Pulmonary embolism (HCC)     Seizures ([place] [address] )     Treatment of healed fracture follow-up examination 3/25/2017     Social History     Social History    Marital status: Single     Spouse name: N/A    Number of children: N/A    Years of education: N/A     Social History Main Topics    Smoking status: Current Every Day Smoker     Packs/day: 0 50     Years: 25 00    Smokeless tobacco: Never Used    Alcohol use No    Drug use: No    Sexual activity: Not Asked     Other Topics Concern    None     Social History Narrative    None       Subjective         Objective    Physical Exam:   Assessment Type: Assess only  General Appearance: Awake, Alert  Respiratory Pattern: Normal  Chest Assessment: Chest expansion symmetrical  Bilateral Breath Sounds: Clear  Cough: Non-productive    Vitals:  Blood pressure 120/74, pulse 102, temperature 98 °F (36 7 °C), temperature source Oral, resp  rate 18, height 5' 6" (1 676 m), weight 92 7 kg (204 lb 5 9 oz), SpO2 98 %            Imaging and other studies:           Plan    Respiratory Plan: (P) Mild Distress pathway        Resp Comments: bipap s/b

## 2018-03-08 NOTE — PROGRESS NOTES
Progress Note - Radha Cleary 1975, 38 y o  female MRN: [id number]    Unit/Bed#: [address] [place] [address] Encounter: [id number]    Primary Care Provider: Cleveland Stacy MD   Date and time admitted to hospital: 3/6/2018  6:10 AM        Acute respiratory failure with hypoxia [facility])   Assessment & Plan    -Multifactorial and due to pulmonary embolism,  COPD exacerbation, and possibly acute interstitial pneumonitis  -cont Solu-Medrol/Levaquin   -appreciate pulmonary  -repeat CXR, read by me, Near complete resolution of patchy bilateral airspace opacities which likely represented pulmonary edema   Minimal interstitial edema remains  -received one dose of 40 mg of IV Lasix on 3/6/2018 for acute pulmonary edema thought to be due to 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  -will give another dose of 40 mg of IV Lasix now  -unclear if patient has chronic hypoxemic respiratory failure at this time  She was not on oxygen prior to admission but it is in the realm of possibility that she may require oxygen on discharge due to underlying COPD  Chronic obstructive pulmonary disease with acute exacerbation (HCC)   Assessment & Plan    -cont Solu-Medrol/ Levaquin  -now saturating well on 2 L nasal cannular oxygen        Current smoker   Assessment & Plan    -patient counseled for 5 minutes on smoking cessation  -continue nicotine patch        Anxiety   Assessment & Plan    -continue Seroquel  -appreciate psych        * Acute pulmonary embolism (HCC)   Assessment & Plan    -CT:  Acute right lower lobe segmental and subsegmental pulmonary emboli  No evidence of RV strain based on RV/LV ratio   -continue Eliquis  -Patient reportedly stopped her NOAC because she did not want to take so many medications approximately 1 year ago  -appreciate Pulmonary          VTE Pharmacologic Prophylaxis:   Pharmacologic: Eliquis  Mechanical VTE Prophylaxis in Place: Yes    Patient Centered Rounds:  I have performed bedside rounds with nursing staff today  Education and Discussions with Family / Patient:  Patient    Time Spent for Care: 20 minutes  More than 50% of total time spent on counseling and coordination of care as described above  Current Length of Stay: 2 day(s)    Current Patient Status: Inpatient   Certification Statement: The patient will continue to require additional inpatient hospital stay due to Acute hypoxemic respiratory failure    Discharge Plan:  2-3 days    Code Status: Level 1 - Full Code      Subjective:   Patient complains of dyspnea on exertion and palpitations  Objective:     Vitals:   Temp (24hrs), Av 2 °F (36 8 °C), Min:98 °F (36 7 °C), Max:98 4 °F (36 9 °C)    HR:  [] 102  Resp:  [18] 18  BP: (111-123)/(62-74) 120/74  SpO2:  [94 %-98 %] 98 %  Body mass index is 32 99 kg/m²       Input and Output Summary (last 24 hours):     No intake or output data in the 24 hours ending 18 1124    Physical Exam:     Physical Exam  Gen: NAD, AAOx3, well developed, well nourished  Eyes: EOMI, PERRLA, no scleral icterus  ENMT:  Oropharynx clear of erythema or exudates, no nasal discharge, no otic discharge, moist mucous membranes  Neck:  Supple  Lymph:  No anterior or posterior cervical or supraclavicular lymphadenopathy  Cardiovascular:  Tachycardic, regular rhythm, normal S1-S2, no murmurs, rubs, or gallops  Lungs:  Clear to auscultation bilaterally, no wheezes, or rales, or rhonchi  Abdomen:  Positive bowel sounds, soft, nontender, nondistended, no palpable organomegaly   Skin:  Intact, no obvious lesions or rashes, no edema  Neuro: Cranial nerves 2-12 are intact, non-focal, 5/5 strength in all 4 extremities    Additional Data:     Labs:      Results from last 7 days  Lab Units 18  0450  18  0620   WBC Thousand/uL 15 66*  < > 20 80*   HEMOGLOBIN g/dL 10 3*  < > 10 3*   HEMATOCRIT % 33 7*  < > 33 6*   PLATELETS Thousands/uL 381  < > 341   NEUTROS PCT %  --   -- 78*   LYMPHS PCT %  --   --  15   LYMPHO PCT % 5*  < >  --    MONOS PCT %  --   --  6   MONO PCT MAN % 2*  < >  --    EOS PCT %  --   --  1   EOSINO PCT MANUAL % 0  < >  --    < > = values in this interval not displayed  Results from last 7 days  Lab Units 03/08/18  0450 03/07/18  0913   SODIUM mmol/L 141 140   POTASSIUM mmol/L 4 0 2 9*   CHLORIDE mmol/L 107 101   CO2 mmol/L 25 26   BUN mg/dL 8 8   CREATININE mg/dL 0 58* 0 69   CALCIUM mg/dL 9 0 9 6   TOTAL PROTEIN g/dL  --  7 9   BILIRUBIN TOTAL mg/dL  --  0 40   ALK PHOS U/L  --  169*   ALT U/L  --  17   AST U/L  --  12   GLUCOSE RANDOM mg/dL 117 121       Results from last 7 days  Lab Units 03/06/18  0620   INR  1 04       * I Have Reviewed All Lab Data Listed Above  * Additional [facility]ertinent Lab Tests Reviewed:  [facility]    Recent Cultures (last 7 days):       Results from last 7 days  Lab Units 03/06/18  1604   INFLUENZA A PCR  None Detected   INFLUENZA B PCR  None Detected   RSV PCR  None Detected       Last 24 Hours Medication List:     Current Facility-Administered Medications:  albuterol 2 5 mg Nebulization Q4H PRN Caprice Purchase, PA-C   And       sodium chloride 3 mL Nebulization Q4H PRN Caprice Purchase, PA-C   apixaban 10 mg Oral BID Nikky Emery MD   baclofen 20 mg Oral BID Caprice Purchase, PA-C   calcium carbonate 1,000 mg Oral Daily PRN Caprice Purchase, PA-C   docusate sodium 100 mg Oral BID Caprice Purchase, PA-C   furosemide 40 mg Intravenous Once Nikky Emery MD   gabapentin 800 mg Oral 4x Daily Caprice Purchase, PA-C   hydrOXYzine HCL 50 mg Oral Q6H PRN Misha Aures, PA-C   iohexol 100 mL Intravenous Once in imaging Blue Irwin MD   levofloxacin 750 mg Oral Q24H Caprice Purchase, PA-C   loperamide 2 mg Oral Q4H PRN Caprice Purchase, PA-C   LORazepam 0 5 mg Intravenous Q6H PRN Misha Aures, PA-C   methylPREDNISolone sodium succinate 40 mg Intravenous Q8H [address] [place] [address],    mirtazapine 30 mg Oral HS Ian Brenner, ARIANNA   nicotine 1 patch Transdermal Daily [place] Brenner, ARIANNA   pantoprazole 40 mg Oral Daily [place] Brenner, ARIANNA   QUEtiapine 50 mg Oral HS Ian Brenner, ARIANNA   rOPINIRole 8 mg Oral HS [place] Brenner, ARIANNA   temazepam 30 mg Oral HS PRN Ian Brenner, ARIANNA        Today, Patient Was Seen By: Makeda López MD    ** Please Note: Dictation voice to text software may have been used in the creation of this document   **

## 2018-03-08 NOTE — PROGRESS NOTES
Progress Note - Mary Grace Vaughn 38 y o  female MRN: [id number]  Unit/Bed#: [address] [place] [address] Encounter: [id number]      Assessment:  1  Acute hypoxic respiratory failure 2-4   2  Acute pulmonary edema secondary to hyperdynamic LV and PE  3  RLL PE no RV strain on Echo, history of PE on Xarelto which she stopped 1 year ago   4  Current everyday smoker/ likely COPD     Recommendations:  1  Give 1 additional dose of Lasix   2  Continue anticoagulation with Eliquis  3  Would consider D/C of Levaquin tomorrow  4  Wean Solumedrol 40mg IV to q8  5  Continue xopenex and Atrovent every 6 hrs  6  Minimize sedative medications as possible       Subjective:   Patient seen and examined  No new events overnight  She has noted significant clinical improvement after diuresis  Objective:   Vitals: Blood pressure 111/63, pulse 98, temperature 98 4 °F (36 9 °C), temperature source Oral, resp  rate 18, height 5' 6" (1 676 m), weight 92 4 kg (203 lb 11 3 oz), SpO2 95 %  , 2L NC, Body mass index is 32 88 kg/m²  Intake/Output Summary (Last 24 hours) at 03/07/18 1903  Last data filed at 03/07/18 0551   Gross per 24 hour   Intake           315 62 ml   Output              250 ml   Net            65 62 ml       Physical Exam  Gen: Awake, alert, oriented x 3, no acute distress  HEENT: Mucous membranes moist, no oral lesions, no thrush  NECK: No accessory muscle use, JVP not elevated  Cardiac: Regular, single S1, single S2, no murmurs, no rubs, no gallops  Lungs: Improved breath sounds, crackles in bases bilaterally  Abdomen: normoactive bowel sounds, soft nontender, nondistended, no rebound or rigidity, no guarding  Extremities: no cyanosis, no clubbing, no edema    Labs: I have personally reviewed pertinent lab results      Results from last 7 days  Lab Units 03/07/18  0913 03/06/18  0620   WBC Thousand/uL 12 45* 20 80*   HEMOGLOBIN g/dL 11 7 10 3*   HEMATOCRIT % 37 2 33 6*   PLATELETS Thousands/uL 377 341 NEUTROS PCT %  --  78*   MONOS PCT %  --  6   MONO PCT MAN % 1*  --       Results from last 7 days  Lab Units 03/07/18  0913 03/06/18  0620   SODIUM mmol/L 140 138   POTASSIUM mmol/L 2 9* 3 6   CHLORIDE mmol/L 101 101   CO2 mmol/L 26 24   BUN mg/dL 8 9   CREATININE mg/dL 0 69 0 76   CALCIUM mg/dL 9 6 7 7*   TOTAL PROTEIN g/dL 7 9 6 6   BILIRUBIN TOTAL mg/dL 0 40 0 40   ALK PHOS U/L 169* 137*   ALT U/L 17 19   AST U/L 12 18   GLUCOSE RANDOM mg/dL 121 102                Results from last 7 days  Lab Units 03/07/18  0913 03/07/18  0520 03/06/18  2230  03/06/18  0620   INR   --   --   --   --  1 04   PTT seconds 53* 87* 55*  < > 33   < > = values in this interval not displayed          0  Lab Value Date/Time   TROPONINI <0 02 03/06/2018 0620         Meds/Allergies   Current Facility-Administered Medications   Medication Dose Route Frequency    albuterol inhalation solution 2 5 mg  2 5 mg Nebulization Q4H PRN    And    sodium chloride 0 9 % inhalation solution 3 mL  3 mL Nebulization Q4H PRN    apixaban (ELIQUIS) tablet 10 mg  10 mg Oral BID    baclofen tablet 20 mg  20 mg Oral BID    calcium carbonate (TUMS) chewable tablet 1,000 mg  1,000 mg Oral Daily PRN    docusate sodium (COLACE) capsule 100 mg  100 mg Oral BID    gabapentin (NEURONTIN) capsule 800 mg  800 mg Oral 4x Daily    hydrOXYzine HCL (ATARAX) tablet 50 mg  50 mg Oral Q6H PRN    iohexol (OMNIPAQUE) 350 MG/ML injection (MULTI-DOSE) 100 mL  100 mL Intravenous Once in imaging    levofloxacin (LEVAQUIN) tablet 750 mg  750 mg Oral Q24H    loperamide (IMODIUM) capsule 2 mg  2 mg Oral Q4H PRN    LORazepam (ATIVAN) 2 mg/mL injection 0 5 mg  0 5 mg Intravenous Q6H PRN    methylPREDNISolone sodium succinate (Solu-MEDROL) injection 40 mg  40 mg Intravenous Q6H [place]    mirtazapine (REMERON) tablet 30 mg  30 mg Oral HS    nicotine (NICODERM CQ) 14 mg/24hr TD 24 hr patch 1 patch  1 patch Transdermal Daily    pantoprazole (PROTONIX) EC tablet 40 mg  40 mg Oral Daily    QUEtiapine (SEROquel) tablet 50 mg  50 mg Oral HS    rOPINIRole (REQUIP) tablet 8 mg  8 mg Oral HS    temazepam (RESTORIL) capsule 30 mg  30 mg Oral HS PRN     Prescriptions Prior to Admission   Medication    DULoxetine (CYMBALTA) 30 mg delayed release capsule    hydrOXYzine pamoate (VISTARIL) 50 mg capsule    pantoprazole (PROTONIX) 40 mg tablet    baclofen 20 mg tablet    busPIRone (BUSPAR) 15 mg tablet    gabapentin (NEURONTIN) 800 mg tablet    mirtazapine (REMERON) 30 mg tablet    QUEtiapine (SEROquel) 50 mg tablet    rOPINIRole (REQUIP) 4 mg tablet    temazepam (RESTORIL) 30 mg capsule    venlafaxine (EFFEXOR) 37 5 mg tablet         Microbiology:  No results found for: Denise Sharif, SPUTUMCULTUR    Imaging and other studies: I have personally reviewed pertinent reports  CXR -- 3/7/28 - IMPRESSION:  Near complete resolution of patchy bilateral airspace opacities which likely represented pulmonary edema  Minimal interstitial edema remains  Echo - SUMMARY     LEFT VENTRICLE:  Systolic function was hyperdynamic  Ejection fraction was estimated to be 70 %    There were no regional wall motion abnormalities      RIGHT VENTRICLE:  The size was normal   Systolic function was normal      MITRAL VALVE:  There was trace regurgitation      TRICUSPID VALVE:  There was mild regurgitation      DO [facility]

## 2018-03-08 NOTE — PROGRESS NOTES
Progress Note - Pulmonary   Ana Cost 38 y o  female MRN: [id number]  Unit/Bed#: [address] [place] [address] Encounter: [id number]      Assessment:  1   Acute hypoxic respiratory failure 2-4   2   Acute pulmonary edema secondary to hyperdynamic LV and PE  3   RLL PE no RV strain on Echo, history of PE on Xarelto which she stopped 1 year ago   4   Current everyday smoker/ likely COPD     Recommendations:  1   Continue anticoagulation with Eliquis  2   Would D/C Levaquin today  3   Wean Solumedrol 40mg IV to q12 tomorrow   4   Continue xopenex and Atrovent every 6 hrs  5   Minimize sedative medications as possible     Subjective:   Patient seen and examined  No new events overnight  She states that she feels better but still having some dyspnea  Objective:   Vitals: Blood pressure 120/74, pulse 102, temperature 98 °F (36 7 °C), temperature source Oral, resp  rate 18, height 5' 6" (1 676 m), weight 92 7 kg (204 lb 5 9 oz), SpO2 98 % , 2L NC, Body mass index is 32 99 kg/m²  No intake or output data in the 24 hours ending 03/08/18 1129    Physical Exam  Gen: Awake, alert, oriented x 3, no acute distress  HEENT: Mucous membranes moist, no oral lesions, no thrush  NECK: No accessory muscle use, JVP not elevated  Cardiac: Regular, single S1, single S2, no murmurs, no rubs, no gallops  Lungs: Mild crackles in the lungs bilaterally  Abdomen: normoactive bowel sounds, soft nontender, nondistended, no rebound or rigidity, no guarding  Extremities: no cyanosis, no clubbing, no edema    Labs: I have personally reviewed pertinent lab results      Results from last 7 days  Lab Units 03/08/18 0450 03/07/18 0913 03/06/18  0620   WBC Thousand/uL 15 66* 12 45* 20 80*   HEMOGLOBIN g/dL 10 3* 11 7 10 3*   HEMATOCRIT % 33 7* 37 2 33 6*   PLATELETS Thousands/uL 381 377 341   NEUTROS PCT %  --   --  78*   MONOS PCT %  --   --  6   MONO PCT MAN % 2* 1*  --       Results from last 7 days  Lab Units 03/08/18 0450 03/07/18 0913 03/06/18  0620   SODIUM mmol/L 141 140 138   POTASSIUM mmol/L 4 0 2 9* 3 6   CHLORIDE mmol/L 107 101 101   CO2 mmol/L 25 26 24   BUN mg/dL 8 8 9   CREATININE mg/dL 0 58* 0 69 0 76   CALCIUM mg/dL 9 0 9 6 7 7*   TOTAL PROTEIN g/dL  --  7 9 6 6   BILIRUBIN TOTAL mg/dL  --  0 40 0 40   ALK PHOS U/L  --  169* 137*   ALT U/L  --  17 19   AST U/L  --  12 18   GLUCOSE RANDOM mg/dL 117 121 102                Results from last 7 days  Lab Units 03/07/18  0913 03/07/18  0520 03/06/18  2230  03/06/18  0620   INR   --   --   --   --  1 04   PTT seconds 53* 87* 55*  < > 33   < > = values in this interval not displayed          0  Lab Value Date/Time   TROPONINI <0 02 03/06/2018 0620         Meds/Allergies   Current Facility-Administered Medications   Medication Dose Route Frequency    albuterol inhalation solution 2 5 mg  2 5 mg Nebulization Q4H PRN    And    sodium chloride 0 9 % inhalation solution 3 mL  3 mL Nebulization Q4H PRN    apixaban (ELIQUIS) tablet 10 mg  10 mg Oral BID    baclofen tablet 20 mg  20 mg Oral BID    calcium carbonate (TUMS) chewable tablet 1,000 mg  1,000 mg Oral Daily PRN    docusate sodium (COLACE) capsule 100 mg  100 mg Oral BID    furosemide (LASIX) injection 40 mg  40 mg Intravenous Once    gabapentin (NEURONTIN) capsule 800 mg  800 mg Oral 4x Daily    hydrOXYzine HCL (ATARAX) tablet 50 mg  50 mg Oral Q6H PRN    iohexol (OMNIPAQUE) 350 MG/ML injection (MULTI-DOSE) 100 mL  100 mL Intravenous Once in imaging    levofloxacin (LEVAQUIN) tablet 750 mg  750 mg Oral Q24H    loperamide (IMODIUM) capsule 2 mg  2 mg Oral Q4H PRN    LORazepam (ATIVAN) 2 mg/mL injection 0 5 mg  0 5 mg Intravenous Q6H PRN    methylPREDNISolone sodium succinate (Solu-MEDROL) injection 40 mg  40 mg Intravenous Q8H [address]    mirtazapine (REMERON) tablet 30 mg  30 mg Oral HS    nicotine (NICODERM CQ) 14 mg/24hr TD 24 hr patch 1 patch  1 patch Transdermal Daily    pantoprazole (PROTONIX) EC tablet 40 mg  40 mg Oral Daily    QUEtiapine (SEROquel) tablet 50 mg  50 mg Oral HS    rOPINIRole (REQUIP) tablet 8 mg  8 mg Oral HS    temazepam (RESTORIL) capsule 30 mg  30 mg Oral HS PRN     Prescriptions Prior to Admission   Medication    DULoxetine (CYMBALTA) 30 mg delayed release capsule    hydrOXYzine pamoate (VISTARIL) 50 mg capsule    pantoprazole (PROTONIX) 40 mg tablet    baclofen 20 mg tablet    busPIRone (BUSPAR) 15 mg tablet    gabapentin (NEURONTIN) 800 mg tablet    mirtazapine (REMERON) 30 mg tablet    QUEtiapine (SEROquel) 50 mg tablet    rOPINIRole (REQUIP) 4 mg tablet    temazepam (RESTORIL) 30 mg capsule    venlafaxine (EFFEXOR) 37 5 mg tablet         Microbiology:  No results found for: Scherry [place], SPUTUMCULTUR    No new imaging     [place] DO [facility]

## 2018-03-08 NOTE — ASSESSMENT & PLAN NOTE
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

## 2018-03-09 LAB
AMPHETAMINES SERPL QL SCN: NEGATIVE
ANION GAP SERPL CALCULATED.3IONS-SCNC: 9 MMOL/L (ref 4–13)
BARBITURATES UR QL: NEGATIVE
BASOPHILS # BLD AUTO: 0.01 THOUSANDS/ΜL (ref 0–0.1)
BASOPHILS NFR BLD AUTO: 0 % (ref 0–1)
BENZODIAZ UR QL: NEGATIVE
BUN SERPL-MCNC: 13 MG/DL (ref 5–25)
CALCIUM SERPL-MCNC: 8.2 MG/DL (ref 8.3–10.1)
CHLORIDE SERPL-SCNC: 106 MMOL/L (ref 100–108)
CO2 SERPL-SCNC: 27 MMOL/L (ref 21–32)
COCAINE UR QL: NEGATIVE
CREAT SERPL-MCNC: 0.75 MG/DL (ref 0.6–1.3)
EOSINOPHIL # BLD AUTO: 0.01 THOUSAND/ΜL (ref 0–0.61)
EOSINOPHIL NFR BLD AUTO: 0 % (ref 0–6)
ERYTHROCYTE [DISTWIDTH] IN BLOOD BY AUTOMATED COUNT: 18.6 % (ref 11.6–15.1)
GFR SERPL CREATININE-BSD FRML MDRD: 100 ML/MIN/1.73SQ M
GLUCOSE SERPL-MCNC: 114 MG/DL (ref 65–140)
HCT VFR BLD AUTO: 32.3 % (ref 34.8–46.1)
HGB BLD-MCNC: 9.8 G/DL (ref 11.5–15.4)
LYMPHOCYTES # BLD AUTO: 1.32 THOUSANDS/ΜL (ref 0.6–4.47)
LYMPHOCYTES NFR BLD AUTO: 10 % (ref 14–44)
MCH RBC QN AUTO: 22.3 PG (ref 26.8–34.3)
MCHC RBC AUTO-ENTMCNC: 30.3 G/DL (ref 31.4–37.4)
MCV RBC AUTO: 73 FL (ref 82–98)
METHADONE UR QL: NEGATIVE
MONOCYTES # BLD AUTO: 0.47 THOUSAND/ΜL (ref 0.17–1.22)
MONOCYTES NFR BLD AUTO: 3 % (ref 4–12)
NEUTROPHILS # BLD AUTO: 11.97 THOUSANDS/ΜL (ref 1.85–7.62)
NEUTS SEG NFR BLD AUTO: 87 % (ref 43–75)
OPIATES UR QL SCN: POSITIVE
PCP UR QL: NEGATIVE
PLATELET # BLD AUTO: 395 THOUSANDS/UL (ref 149–390)
PMV BLD AUTO: 9.5 FL (ref 8.9–12.7)
POTASSIUM SERPL-SCNC: 3.9 MMOL/L (ref 3.5–5.3)
RBC # BLD AUTO: 4.4 MILLION/UL (ref 3.81–5.12)
SODIUM SERPL-SCNC: 142 MMOL/L (ref 136–145)
THC UR QL: NEGATIVE
WBC # BLD AUTO: 13.78 THOUSAND/UL (ref 4.31–10.16)

## 2018-03-09 PROCEDURE — 80307 DRUG TEST PRSMV CHEM ANLYZR: CPT | Performed by: INTERNAL MEDICINE

## 2018-03-09 PROCEDURE — 80048 BASIC METABOLIC PNL TOTAL CA: CPT | Performed by: HOSPITALIST

## 2018-03-09 PROCEDURE — 99233 SBSQ HOSP IP/OBS HIGH 50: CPT | Performed by: HOSPITALIST

## 2018-03-09 PROCEDURE — 99232 SBSQ HOSP IP/OBS MODERATE 35: CPT | Performed by: INTERNAL MEDICINE

## 2018-03-09 PROCEDURE — 85025 COMPLETE CBC W/AUTO DIFF WBC: CPT | Performed by: HOSPITALIST

## 2018-03-09 RX ORDER — NALOXONE HYDROCHLORIDE 0.4 MG/ML
INJECTION, SOLUTION INTRAMUSCULAR; INTRAVENOUS; SUBCUTANEOUS
Status: COMPLETED
Start: 2018-03-09 | End: 2018-03-09

## 2018-03-09 RX ORDER — METHYLPREDNISOLONE SODIUM SUCCINATE 40 MG/ML
40 INJECTION, POWDER, LYOPHILIZED, FOR SOLUTION INTRAMUSCULAR; INTRAVENOUS EVERY 12 HOURS SCHEDULED
Status: DISCONTINUED | OUTPATIENT
Start: 2018-03-09 | End: 2018-03-10 | Stop reason: HOSPADM

## 2018-03-09 RX ORDER — NALOXONE HYDROCHLORIDE 1 MG/ML
0.4 INJECTION INTRAMUSCULAR; INTRAVENOUS; SUBCUTANEOUS ONCE
Status: DISCONTINUED | OUTPATIENT
Start: 2018-03-09 | End: 2018-03-09

## 2018-03-09 RX ORDER — NALOXONE HYDROCHLORIDE 1 MG/ML
0.04 INJECTION INTRAMUSCULAR; INTRAVENOUS; SUBCUTANEOUS ONCE
Status: DISCONTINUED | OUTPATIENT
Start: 2018-03-09 | End: 2018-03-10 | Stop reason: HOSPADM

## 2018-03-09 RX ADMIN — NALOXONE HYDROCHLORIDE 0.4 MG: 0.4 INJECTION, SOLUTION INTRAMUSCULAR; INTRAVENOUS; SUBCUTANEOUS at 21:32

## 2018-03-09 RX ADMIN — NALOXONE HYDROCHLORIDE 0.4 MG: 0.4 INJECTION, SOLUTION INTRAMUSCULAR; INTRAVENOUS; SUBCUTANEOUS at 22:07

## 2018-03-09 RX ADMIN — BACLOFEN 20 MG: 10 TABLET ORAL at 18:20

## 2018-03-09 RX ADMIN — METHYLPREDNISOLONE SODIUM SUCCINATE 40 MG: 40 INJECTION, POWDER, FOR SOLUTION INTRAMUSCULAR; INTRAVENOUS at 05:00

## 2018-03-09 RX ADMIN — GABAPENTIN 800 MG: 400 CAPSULE ORAL at 10:58

## 2018-03-09 RX ADMIN — APIXABAN 10 MG: 5 TABLET, FILM COATED ORAL at 18:20

## 2018-03-09 RX ADMIN — NICOTINE 1 PATCH: 14 PATCH, EXTENDED RELEASE TRANSDERMAL at 11:00

## 2018-03-09 RX ADMIN — HYDROCODONE BITARTRATE AND ACETAMINOPHEN 1 TABLET: 5; 325 TABLET ORAL at 11:08

## 2018-03-09 RX ADMIN — HYDROXYZINE HYDROCHLORIDE 50 MG: 25 TABLET, FILM COATED ORAL at 15:27

## 2018-03-09 RX ADMIN — GABAPENTIN 800 MG: 400 CAPSULE ORAL at 13:12

## 2018-03-09 RX ADMIN — METHYLPREDNISOLONE SODIUM SUCCINATE 40 MG: 40 INJECTION, POWDER, FOR SOLUTION INTRAMUSCULAR; INTRAVENOUS at 23:30

## 2018-03-09 RX ADMIN — LORAZEPAM 0.5 MG: 2 INJECTION INTRAMUSCULAR; INTRAVENOUS at 18:28

## 2018-03-09 RX ADMIN — BACLOFEN 20 MG: 10 TABLET ORAL at 10:59

## 2018-03-09 RX ADMIN — PANTOPRAZOLE SODIUM 40 MG: 40 TABLET, DELAYED RELEASE ORAL at 07:00

## 2018-03-09 RX ADMIN — APIXABAN 10 MG: 5 TABLET, FILM COATED ORAL at 10:58

## 2018-03-09 RX ADMIN — GABAPENTIN 800 MG: 400 CAPSULE ORAL at 18:20

## 2018-03-09 NOTE — SOCIAL WORK
Spoke with patient about tentative discharge  She now feels that she would benefit from [address] [facility]  Given freedom of choice  [facility]  Referral via [facility] to [facility]

## 2018-03-09 NOTE — ASSESSMENT & PLAN NOTE
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

## 2018-03-09 NOTE — PROGRESS NOTES
Progress Note - Pulmonary   Matt Delaney 38 y o  female MRN: [id number]  Unit/Bed#: [address] [place] [address] Encounter: [id number]      Assessment:  1   Acute hypoxic respiratory failure 2-4   2   Acute pulmonary edema secondary to hyperdynamic LV and PE  3   RLL PE no RV strain on Echo, history of PE on Xarelto which she stopped 1 year ago   4   Current everyday smoker/ likely COPD     Recommendations:  1   Continue anticoagulation with Eliquis lifelong  2   Wean to Solumedrol 40mg IV to q12 today  3   Continue xopenex and Atrovent every 6 hrs  4   Minimize sedative medications as possible  5  Needs ambulatory pulse oximetry prior to discharge  6  Will need repeat echo in 3- 6 months  7  For smoking cessation would recommend nicotine replacement  Due to psychiatric history, would have to discuss prior to initiation of chantix or wellbutrin    Subjective:   Patient seen and examined  No new events overnight  She continues to have some pleuritic chest pain and dyspnea on exertion but both seem to be improving slowly  Objective:   Vitals: Blood pressure 138/68, pulse 83, temperature 97 9 °F (36 6 °C), temperature source Oral, resp  rate 20, height 5' 6" (1 676 m), weight 91 kg (200 lb 9 9 oz), SpO2 98 % , RA, Body mass index is 32 38 kg/m²  No intake or output data in the 24 hours ending 03/09/18 1759      Physical Exam  Gen: Awake, alert, oriented x 3, no acute distress  HEENT: Mucous membranes moist, no oral lesions, no thrush  NECK: No accessory muscle use, JVP not elevated  Cardiac: Regular, single S1, single S2, no murmurs, no rubs, no gallops  Lungs: mild crackles in lung bases   Abdomen: normoactive bowel sounds, soft nontender, nondistended, no rebound or rigidity, no guarding  Extremities: no cyanosis, no clubbing, no edema    Labs: I have personally reviewed pertinent lab results      Results from last 7 days  Lab Units 03/09/18  0451 03/08/18  0450 03/07/18  0913 03/06/18  0620   WBC Thousand/uL 13 78* 15 66* 12 45* 20 80*   HEMOGLOBIN g/dL 9 8* 10 3* 11 7 10 3*   HEMATOCRIT % 32 3* 33 7* 37 2 33 6*   PLATELETS Thousands/uL 395* 381 377 341   NEUTROS PCT % 87*  --   --  78*   MONOS PCT % 3*  --   --  6   MONO PCT MAN %  --  2* 1*  --       Results from last 7 days  Lab Units 03/09/18  0451 03/08/18  0450 03/07/18  0913 03/06/18  0620   SODIUM mmol/L 142 141 140 138   POTASSIUM mmol/L 3 9 4 0 2 9* 3 6   CHLORIDE mmol/L 106 107 101 101   CO2 mmol/L 27 25 26 24   BUN mg/dL 13 8 8 9   CREATININE mg/dL 0 75 0 58* 0 69 0 76   CALCIUM mg/dL 8 2* 9 0 9 6 7 7*   TOTAL PROTEIN g/dL  --   --  7 9 6 6   BILIRUBIN TOTAL mg/dL  --   --  0 40 0 40   ALK PHOS U/L  --   --  169* 137*   ALT U/L  --   --  17 19   AST U/L  --   --  12 18   GLUCOSE RANDOM mg/dL 114 117 121 102                Results from last 7 days  Lab Units 03/07/18  0913 03/07/18  0520 03/06/18  2230  03/06/18  0620   INR   --   --   --   --  1 04   PTT seconds 53* 87* 55*  < > 33   < > = values in this interval not displayed          0  Lab Value Date/Time   TROPONINI <0 02 03/06/2018 0620         Meds/Allergies   Current Facility-Administered Medications   Medication Dose Route Frequency    albuterol inhalation solution 2 5 mg  2 5 mg Nebulization Q4H PRN    And    sodium chloride 0 9 % inhalation solution 3 mL  3 mL Nebulization Q4H PRN    apixaban (ELIQUIS) tablet 10 mg  10 mg Oral BID    baclofen tablet 20 mg  20 mg Oral BID    calcium carbonate (TUMS) chewable tablet 1,000 mg  1,000 mg Oral Daily PRN    docusate sodium (COLACE) capsule 100 mg  100 mg Oral BID    gabapentin (NEURONTIN) capsule 800 mg  800 mg Oral 4x Daily    HYDROcodone-acetaminophen (NORCO) 5-325 mg per tablet 1 tablet  1 tablet Oral Q6H PRN    hydrOXYzine HCL (ATARAX) tablet 50 mg  50 mg Oral Q6H PRN    iohexol (OMNIPAQUE) 350 MG/ML injection (MULTI-DOSE) 100 mL  100 mL Intravenous Once in imaging    loperamide (IMODIUM) capsule 2 mg  2 mg Oral Q4H PRN    LORazepam (ATIVAN) 2 mg/mL injection 0 5 mg  0 5 mg Intravenous Q6H PRN    methylPREDNISolone sodium succinate (Solu-MEDROL) injection 40 mg  40 mg Intravenous Q12H [facility]    mirtazapine (REMERON) tablet 30 mg  30 mg Oral HS    nicotine (NICODERM CQ) 14 mg/24hr TD 24 hr patch 1 patch  1 patch Transdermal Daily    pantoprazole (PROTONIX) EC tablet 40 mg  40 mg Oral Daily    QUEtiapine (SEROquel) tablet 50 mg  50 mg Oral HS    rOPINIRole (REQUIP) tablet 8 mg  8 mg Oral HS    temazepam (RESTORIL) capsule 30 mg  30 mg Oral HS PRN     Prescriptions Prior to Admission   Medication    DULoxetine (CYMBALTA) 30 mg delayed release capsule    hydrOXYzine pamoate (VISTARIL) 50 mg capsule    pantoprazole (PROTONIX) 40 mg tablet    baclofen 20 mg tablet    busPIRone (BUSPAR) 15 mg tablet    gabapentin (NEURONTIN) 800 mg tablet    mirtazapine (REMERON) 30 mg tablet    QUEtiapine (SEROquel) 50 mg tablet    rOPINIRole (REQUIP) 4 mg tablet    temazepam (RESTORIL) 30 mg capsule    venlafaxine (EFFEXOR) 37 5 mg tablet         Microbiology:  No results found for: [employer], SPUTUM[employer]UR      Imaging and other studies:no new studies    Jenene Cabot, DO [facility]

## 2018-03-09 NOTE — PROGRESS NOTES
Progress Note - Precious Diez 1975, 38 y o  female MRN: [id number]    Unit/Bed#: [address] [place] [address] Encounter: [id number]    Primary Care Provider: Evelyne Lopez MD   Date and time admitted to hospital: 3/6/2018  6:10 AM        Acute respiratory failure with hypoxia [facility])   Assessment & Plan    -Multifactorial and due to pulmonary embolism, COPD exacerbation, pulmonary edema due to right heart failure, and possibly acute interstitial pneumonitis  -cont Solu-Medrol but taper to 40 mg IV q 12  -appreciate pulmonary  -repeat CXR, read by me, Near complete resolution of patchy bilateral airspace opacities which likely represented pulmonary edema   Minimal interstitial edema remains  -received one dose of 40 mg of IV Lasix on 3/6/2018 for acute pulmonary edema thought to be due to 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  The patient received another dose of 40 mg of IV Lasix 3/8    -repeat chest x-ray 3/10/2018 in the morning   -unclear if patient has chronic hypoxemic respiratory failure at this time  She was not on oxygen prior to admission but it is in the realm of possibility that she may require oxygen on discharge due to underlying COPD  Chronic obstructive pulmonary disease with acute exacerbation (HCC)   Assessment & Plan    -cont Solu-Medrol but taper to 40mg IV BID  -was on Levaquin but this is been stopped   -now saturating well on 2 L nasal cannular oxygen  Patient may have chronic hypoxemic respiratory failure  It is unclear at this time but she may need to go home on oxygen          Epilepsy ([facility] [address] )   Assessment & Plan    No seizure activity noted, continue home meds        Current smoker   Assessment & Plan    -patient counseled for 5 minutes on smoking cessation  -continue nicotine patch        Anxiety   Assessment & Plan    -continue Seroquel  -appreciate psych        * Acute pulmonary embolism ([facility] [address] )   Assessment & Plan    -CT:  Acute right lower lobe segmental and subsegmental pulmonary emboli  No evidence of RV strain based on RV/LV ratio   -continue Eliquis  -Patient reportedly stopped her NOAC because she did not want to take so many medications approximately 1 year ago  -appreciate Pulmonary          VTE Pharmacologic Prophylaxis:   Pharmacologic: Apixaban (Eliquis)  Mechanical VTE Prophylaxis in Place: No    Patient Centered Rounds: I have performed bedside rounds with nursing staff today  Education and Discussions with Family / Patient:  Patient    Time Spent for Care: 15 minutes  More than 50% of total time spent on counseling and coordination of care as described above  Current Length of Stay: 3 day(s)    Current Patient Status: Inpatient   Certification Statement: The patient will continue to require additional inpatient hospital stay due to Pulmonary emboli in COPD    Discharge Plan:  1-2 days    Code Status: Level 1 - Full Code      Subjective:   Patient reports she still has shortness of breath but it is improving  Objective:     Vitals:   Temp (24hrs), Av °F (36 7 °C), Min:97 8 °F (36 6 °C), Max:98 4 °F (36 9 °C)    HR:  [] 76  Resp:  [18-21] 20  BP: (117-129)/(66-73) 129/73  SpO2:  [95 %-100 %] 100 %  Body mass index is 32 38 kg/m²       Input and Output Summary (last 24 hours):     No intake or output data in the 24 hours ending 18 1151    Physical Exam:     Physical Exam  Gen: NAD, AAOx3, well developed, well nourished  Eyes: EOMI, PERRLA, no scleral icterus  ENMT:  Oropharynx clear of erythema or exudates, no nasal discharge, no otic discharge, moist mucous membranes  Neck:  Supple  Lymph:  No anterior or posterior cervical or supraclavicular lymphadenopathy  Cardiovascular:   regular rate, regular rhythm, normal S1-S2, no murmurs, rubs, or gallops  Lungs:  Clear to auscultation bilaterally, no wheezes, or rales, or rhonchi  Abdomen:  Positive bowel sounds, soft, nontender, nondistended, no palpable organomegaly Skin:  Intact, no obvious lesions or rashes, no edema  Neuro: Cranial nerves 2-12 are intact, non-focal, 5/5 strength in all 4 extremities    Additional Data:     Labs:      Results from last 7 days  Lab Units 03/09/18  0451   WBC Thousand/uL 13 78*   HEMOGLOBIN g/dL 9 8*   HEMATOCRIT % 32 3*   PLATELETS Thousands/uL 395*   NEUTROS PCT % 87*   LYMPHS PCT % 10*   MONOS PCT % 3*   EOS PCT % 0       Results from last 7 days  Lab Units 03/09/18  0451  03/07/18  0913   SODIUM mmol/L 142  < > 140   POTASSIUM mmol/L 3 9  < > 2 9*   CHLORIDE mmol/L 106  < > 101   CO2 mmol/L 27  < > 26   BUN mg/dL 13  < > 8   CREATININE mg/dL 0 75  < > 0 69   CALCIUM mg/dL 8 2*  < > 9 6   TOTAL PROTEIN g/dL  --   --  7 9   BILIRUBIN TOTAL mg/dL  --   --  0 40   ALK PHOS U/L  --   --  169*   ALT U/L  --   --  17   AST U/L  --   --  12   GLUCOSE RANDOM mg/dL 114  < > 121   < > = values in this interval not displayed  Results from last 7 days  Lab Units 03/06/18  0620   INR  1 04       * I Have Reviewed All Lab Data Listed Above  * Additional Pertinent Lab Tests Reviewed:  Nina [address] Admission Reviewed      Recent Cultures (last 7 days):       Results from last 7 days  Lab Units 03/06/18  1604   INFLUENZA A PCR  None Detected   INFLUENZA B PCR  None Detected   RSV PCR  None Detected       Last 24 Hours Medication List:     Current Facility-Administered Medications:  albuterol 2 5 mg Nebulization Q4H PRN Jackey Spatz, PA-C   And       sodium chloride 3 mL Nebulization Q4H PRN Jackey Spatz, PA-C   apixaban 10 mg Oral BID Eliana DaS ilva MD   baclofen 20 mg Oral BID Jackey Spatz, PA-C   calcium carbonate 1,000 mg Oral Daily PRN Jackey Spatz, PA-C   docusate sodium 100 mg Oral BID Jackey Spatz, PA-C   gabapentin 800 mg Oral 4x Daily Jackey Spatz, PA-C   HYDROcodone-acetaminophen 1 tablet Oral Q6H PRN Aderonke Cyndi Schlatter, MD   hydrOXYzine HCL 50 mg Oral Q6H PRN Christie Rosado ARIANNA   iohexol 100 mL Intravenous Once in imaging Zuleima Dangelo MD   loperamide 2 mg Oral Q4H PRN Weston Sharp, ARIANNA   LORazepam 0 5 mg Intravenous Q6H PRN Nayana Loop, ARIANNA   methylPREDNISolone sodium succinate 40 mg Intravenous Q12H [facility] Afia Apple MD   mirtazapine 30 mg Oral HS Weston Sharp, ARIANNA   nicotine 1 patch Transdermal Daily Weston Sharp, ARIANNA   pantoprazole 40 mg Oral Daily Weston Sharp, ARIANNA   QUEtiapine 50 mg Oral HS Weston Sharp, ARIANNA   rOPINIRole 8 mg Oral HS Weston Sharp, ARIANNA   temazepam 30 mg Oral HS PRN Weston Sharp PA-C        Today, Patient Was Seen By: Afia Apple MD    ** Please Note: Dictation voice to text software may have been used in the creation of this document   **

## 2018-03-09 NOTE — ASSESSMENT & PLAN NOTE
-cont Solu-Medrol but taper to 40mg IV BID  -was on Levaquin but this is been stopped   -now saturating well on 2 L nasal cannular oxygen  Patient may have chronic hypoxemic respiratory failure  It is unclear at this time but she may need to go home on oxygen

## 2018-03-10 ENCOUNTER — APPOINTMENT (INPATIENT)
Dept: RADIOLOGY | Facility: HOSPITAL | Age: 43
DRG: 134 | End: 2018-03-10
Payer: COMMERCIAL

## 2018-03-10 VITALS
DIASTOLIC BLOOD PRESSURE: 57 MMHG | SYSTOLIC BLOOD PRESSURE: 105 MMHG | BODY MASS INDEX: 32.35 KG/M2 | OXYGEN SATURATION: 100 % | WEIGHT: 201.28 LBS | TEMPERATURE: 98 F | HEIGHT: 66 IN | HEART RATE: 70 BPM | RESPIRATION RATE: 16 BRPM

## 2018-03-10 LAB
ANION GAP SERPL CALCULATED.3IONS-SCNC: 6 MMOL/L (ref 4–13)
BASOPHILS # BLD AUTO: 0.02 THOUSANDS/ΜL (ref 0–0.1)
BASOPHILS NFR BLD AUTO: 0 % (ref 0–1)
BUN SERPL-MCNC: 14 MG/DL (ref 5–25)
CALCIUM SERPL-MCNC: 8.2 MG/DL (ref 8.3–10.1)
CHLORIDE SERPL-SCNC: 110 MMOL/L (ref 100–108)
CO2 SERPL-SCNC: 28 MMOL/L (ref 21–32)
CREAT SERPL-MCNC: 0.8 MG/DL (ref 0.6–1.3)
EOSINOPHIL # BLD AUTO: 0.16 THOUSAND/ΜL (ref 0–0.61)
EOSINOPHIL NFR BLD AUTO: 1 % (ref 0–6)
ERYTHROCYTE [DISTWIDTH] IN BLOOD BY AUTOMATED COUNT: 18.7 % (ref 11.6–15.1)
GFR SERPL CREATININE-BSD FRML MDRD: 93 ML/MIN/1.73SQ M
GLUCOSE SERPL-MCNC: 76 MG/DL (ref 65–140)
HCT VFR BLD AUTO: 31.9 % (ref 34.8–46.1)
HGB BLD-MCNC: 9.3 G/DL (ref 11.5–15.4)
LYMPHOCYTES # BLD AUTO: 4.17 THOUSANDS/ΜL (ref 0.6–4.47)
LYMPHOCYTES NFR BLD AUTO: 35 % (ref 14–44)
MCH RBC QN AUTO: 22 PG (ref 26.8–34.3)
MCHC RBC AUTO-ENTMCNC: 29.2 G/DL (ref 31.4–37.4)
MCV RBC AUTO: 76 FL (ref 82–98)
MONOCYTES # BLD AUTO: 0.98 THOUSAND/ΜL (ref 0.17–1.22)
MONOCYTES NFR BLD AUTO: 8 % (ref 4–12)
NEUTROPHILS # BLD AUTO: 6.45 THOUSANDS/ΜL (ref 1.85–7.62)
NEUTS SEG NFR BLD AUTO: 56 % (ref 43–75)
PLATELET # BLD AUTO: 360 THOUSANDS/UL (ref 149–390)
PMV BLD AUTO: 9.1 FL (ref 8.9–12.7)
POTASSIUM SERPL-SCNC: 3.7 MMOL/L (ref 3.5–5.3)
RBC # BLD AUTO: 4.22 MILLION/UL (ref 3.81–5.12)
SODIUM SERPL-SCNC: 144 MMOL/L (ref 136–145)
WBC # BLD AUTO: 11.78 THOUSAND/UL (ref 4.31–10.16)

## 2018-03-10 PROCEDURE — 85025 COMPLETE CBC W/AUTO DIFF WBC: CPT | Performed by: HOSPITALIST

## 2018-03-10 PROCEDURE — 80048 BASIC METABOLIC PNL TOTAL CA: CPT | Performed by: HOSPITALIST

## 2018-03-10 PROCEDURE — 99238 HOSP IP/OBS DSCHRG MGMT 30/<: CPT | Performed by: FAMILY MEDICINE

## 2018-03-10 RX ORDER — PREDNISONE 20 MG/1
TABLET ORAL
Qty: 15 TABLET | Refills: 0 | Status: SHIPPED | OUTPATIENT
Start: 2018-03-10 | End: 2018-05-29 | Stop reason: ALTCHOICE

## 2018-03-10 RX ADMIN — PANTOPRAZOLE SODIUM 40 MG: 40 TABLET, DELAYED RELEASE ORAL at 08:46

## 2018-03-10 RX ADMIN — APIXABAN 10 MG: 5 TABLET, FILM COATED ORAL at 08:45

## 2018-03-10 RX ADMIN — GABAPENTIN 800 MG: 400 CAPSULE ORAL at 08:45

## 2018-03-10 RX ADMIN — LOPERAMIDE HYDROCHLORIDE 2 MG: 2 CAPSULE ORAL at 08:45

## 2018-03-10 RX ADMIN — BACLOFEN 20 MG: 10 TABLET ORAL at 08:45

## 2018-03-10 NOTE — PROGRESS NOTES
Upon rounds, patient appeared to be increasingly lethargic  Difficult to arouse  VSS  Dr Miller Almaraz and [employer], supervisor made aware  Dr Miller Almaraz at bedside  Narcan 0 4 mg IV ordered and given  Dose of Narcan minimally effective with patient awakening and falling back to sleep shortly thereafter  Patient straight cath'd for UDS per MD  Rationale and procedure explained to patient  Dr Miller Almaraz removed patients necklace to protect patients airway due to lethargy and patient restless in the bed  2nd dose of Narcan given again with slight improvement but patient continues to fall asleep  Patient stating she is leaving AMA  Dr Miller Almaraz at bedside to re-evaluate patient  Patient is unable to hold a conversation without falling asleep  Will continue to closely monitor    Per [facility]- room searched for patient safety and 18 pills of Immodium and a bottle of pills found and sent to pharmacy

## 2018-03-10 NOTE — PROGRESS NOTES
Patient continues to be lethargic  Arouses briefly to conversation and then falls back to sleep  MD aware  No new orders received  Will continue to closely monitor

## 2018-03-10 NOTE — PROGRESS NOTES
Pt  Devin Jama her second phone  is not present in room 210A with her  Belongings were searched by Pt  And no second phone  was found  This RN searched prior room 212 (room Pt  Was in prior to be tx'd to room 210A) and no  was found  This RN searched nurse's station and lost & found and no  located  Informed Pt  That no  was located   Pt  Blayne Oswald it was "stolen from her now since it's missing "

## 2018-03-10 NOTE — PROGRESS NOTES
Upon bedside shift report, patient noted to be lethargic but arousable  VSS  Patient arousable to verbal stimulation, oriented x4  Boyfriend at bedside  Call bell in reach  Will continue to monitor

## 2018-03-10 NOTE — PLAN OF CARE
Problem: Potential for Falls  Goal: Patient will remain free of falls  INTERVENTIONS:  - Assess patient frequently for physical needs  -  Identify cognitive and physical deficits and behaviors that affect risk of falls    -  [place] fall precautions as indicated by assessment   - Educate patient/family on patient safety including physical limitations  - Instruct patient to call for assistance with activity based on assessment  - Modify environment to reduce risk of injury  - Consider OT/PT consult to assist with strengthening/mobility   Outcome: Adequate for Discharge      Problem: PAIN - ADULT  Goal: Verbalizes/displays adequate comfort level or baseline comfort level  Interventions:  - Encourage patient to monitor pain and request assistance  - Assess pain using appropriate pain scale  - Administer analgesics based on type and severity of pain and evaluate response  - Implement non-pharmacological measures as appropriate and evaluate response  - Consider cultural and social influences on pain and pain management  - Notify physician/advanced practitioner if interventions unsuccessful or patient reports new pain   Outcome: Adequate for Discharge      Problem: SAFETY ADULT  Goal: Maintain or return to baseline ADL function  INTERVENTIONS:  -  Assess patient's ability to carry out ADLs; assess patient's baseline for ADL function and identify physical deficits which impact ability to perform ADLs (bathing, care of mouth/teeth, toileting, grooming, dressing, etc )  - Assess/evaluate cause of self-care deficits   - Assess range of motion  - Assess patient's mobility; develop plan if impaired  - Assess patient's need for assistive devices and provide as appropriate  - Encourage maximum independence but intervene and supervise when necessary  ¯ Involve family in performance of ADLs  ¯ Assess for home care needs following discharge   ¯ Request OT consult to assist with ADL evaluation and planning for discharge  ¯ Provide patient education as appropriate   Outcome: Adequate for Discharge    Goal: Maintain or return mobility status to optimal level  INTERVENTIONS:  - Assess patient's baseline mobility status (ambulation, transfers, stairs, etc )    - Identify cognitive and physical deficits and behaviors that affect mobility  - Identify mobility aids required to assist with transfers and/or ambulation (gait belt, sit-to-stand, lift, walker, cane, etc )  - [place] fall precautions as indicated by assessment  - Record patient progress and toleration of activity level on Mobility SBAR; progress patient to next Phase/Stage  - Instruct patient to call for assistance with activity based on assessment  - Request Rehabilitation consult to assist with strengthening/weightbearing, etc    Outcome: Adequate for Discharge      Problem: DISCHARGE PLANNING  Goal: Discharge to home or other facility with appropriate resources  INTERVENTIONS:  - Identify barriers to discharge w/patient and caregiver  - Arrange for needed discharge resources and transportation as appropriate  - Identify discharge learning needs (meds, wound care, etc )  - Arrange for interpretive services to assist at discharge as needed  - Refer to Case Management Department for coordinating discharge planning if the patient needs post-hospital services based on physician/advanced practitioner order or complex needs related to functional status, cognitive ability, or social support system   Outcome: Adequate for Discharge      Problem: Knowledge Deficit  Goal: Patient/family/caregiver demonstrates understanding of disease process, treatment plan, medications, and discharge instructions  Complete learning assessment and assess knowledge base    Interventions:  - Provide teaching at level of understanding  - Provide teaching via preferred learning methods   Outcome: Adequate for Discharge      Problem: Prexisting or High Potential for Compromised Skin Integrity  Goal: Skin integrity is maintained or improved  INTERVENTIONS:  - Identify patients at risk for skin breakdown  - Assess and monitor skin integrity  - Assess and monitor nutrition and hydration status  - Monitor labs (i e  albumin)  - Assess for incontinence   - Turn and reposition patient  - Assist with mobility/ambulation  - Relieve pressure over bony prominences  - Avoid friction and shearing  - Provide appropriate hygiene as needed including keeping skin clean and dry  - Evaluate need for skin moisturizer/barrier cream  - Collaborate with interdisciplinary team (i e  Nutrition, Rehabilitation, etc )   - Patient/family teaching   Outcome: Adequate for Discharge      Problem: RESPIRATORY - ADULT  Goal: Achieves optimal ventilation and oxygenation  INTERVENTIONS:  - Assess for changes in respiratory status  - Assess for changes in mentation and behavior  - Position to facilitate oxygenation and minimize respiratory effort  - Oxygen administration by appropriate delivery method based on oxygen saturation (per order) or ABGs  - Initiate smoking cessation education as indicated  - Encourage broncho-pulmonary hygiene including cough, deep breathe, Incentive Spirometry  - Assess the need for suctioning and aspirate as needed  - Assess and instruct to report SOB or any respiratory difficulty  - Respiratory Therapy support as indicated   Outcome: Adequate for Discharge

## 2018-03-10 NOTE — PLAN OF CARE
DISCHARGE PLANNING     Discharge to home or other facility with appropriate resources Not Progressing        Knowledge Deficit     Patient/family/caregiver demonstrates understanding of disease process, treatment plan, medications, and discharge instructions Not Progressing          DISCHARGE PLANNING - CARE MANAGEMENT     Discharge to post-acute care or home with appropriate resources Progressing        PAIN - ADULT     Verbalizes/displays adequate comfort level or baseline comfort level Progressing        Potential for Falls     Patient will remain free of falls Progressing        Prexisting or High Potential for Compromised Skin Integrity     Skin integrity is maintained or improved Progressing        RESPIRATORY - ADULT     Achieves optimal ventilation and oxygenation Progressing        SAFETY ADULT     Maintain or return to baseline ADL function Progressing     Maintain or return mobility status to optimal level Progressing

## 2018-03-10 NOTE — PROGRESS NOTES
Pt  Came out to nurse's station and stated "I found my phone , it was behind the bed on the floor " Pt  Awaiting her ride to pick her up

## 2018-03-10 NOTE — PROGRESS NOTES
Pt  Wishes to leave AMA  AMA form signed by Pt  And Dr Beto MULLINS  IV site taken out and morning meds provided to Pt  All belonging/meds locked up in pharmacy and security were returned to the Pt  New RX scripts were reviewed with Pt  And informed sent to [facility] pharmacy  Pt  In process of packing up belongings

## 2018-03-10 NOTE — DISCHARGE SUMMARY
Discharge Summary - Maria Lewis 38 y o  female MRN: [id number]    Unit/Bed#: [address] [place] [address] Encounter: [id number]    Admission Date: 3/6/2018     Admitting Diagnosis: Pulmonary embolism ([address] ) [I26 99]  Shortness of breath [R06 02]    HPI: Maria Lewis is a 38 y o  female the past medical history for the pulmonary embolism, smoking who presents with shortness of breath x2 days  Patient was reportedly short of breath yesterday while walking on the sidewalk  She reportedly checked her O2 sat at home with her daughters pulse oximeter and was found to be 72% on room air  She then proceeded to take a a 16 hour car ride home from [place] to [place]  Patient reportedly went to the ED in [place] due to lower extremity swelling, however she stated that she would not receive any treatment at that time  While in the ED she was found to be hypoxic with an O2 sat of 76% on room air  A nasal cannula was placed to 3 L which brought her O2 sat up to 93%  A CT scan showed an acute right lower lobe segmental and subsegmental pulmonary emboli  The patient was reportedly on Xarelto and she stopped it on her own accord last year because she did want to be taking so many medications      Procedures Performed: No orders of the defined types were placed in this encounter  Hospital Course:  Discharge is per review of medical records  The patient presented was found to be short of breath be because of a pulmonary embolism  She stopped her a new oral anticoagulant and did not want any medications  She was started on Eliquis  She had an element of COPD and was started on IV Solu-Medrol  She required supplemental oxygen and consultation with Pulmonary Medicine was obtained  She was weaned off supplemental oxygen  3/10/18  Arrived to examine the patient however she was visibly upset and stated that she will sign out against medical advice    I counseled strongly against leaving against medical advice as she was on significant dosage of IV Solu-Medrol, she had received IV Lasix 2 days prior  I tried to explain to her that this is to worsening pulmonary status and possibly even death  She was adamant about events that transpired the previous night and signed out against medical advice  Significant Findings, Care, Treatment and Services Provided:   CTA PE study: Acute right lower lobe segmental and subsegmental pulmonary emboli  Measured RV/LV ratio is within normal limits at less than 0 9       Diffuse groundglass opacity in the lungs with mosaic attenuation, appearance of which is nonspecific with differential considerations including hypersensitivity pneumonitis, acute interstitial pneumonitis (AIP), sequela of small airway disease, early   alveolar edema or potentially atypical infection in an immunocompromised patient (if relevant)   Follow-up chest films advised    Complications:  None    Discharge Diagnosis:  Acute PE  Acute respiratory failure with hypoxia      Resolved Problems  Date Reviewed: 3/9/2018    None          Condition at Discharge: stable     Discharge instructions/Information to patient and family:   See after visit summary for information provided to patient and family  Provisions for Follow-Up Care:  See after visit summary for information related to follow-up care and any pertinent home health orders  Disposition: Left against medical advice    Planned Readmission: No    Discharge Statement   I spent 25 minutes discharging the patient  This time was spent on the day of discharge  I had direct contact with the patient on the day of discharge  Additional documentation is required if more than 30 minutes were spent on discharge  Discharge Medications:  See after visit summary for reconciled discharge medications provided to patient and family

## 2018-03-10 NOTE — PROGRESS NOTES
Called to see patient with change in mental status  She had ativan 0 5mg at about 6 pm but no narcotics today per nursing staff  Noted that she has a neck brace with a pendant that has some greyish substance in it  She was given 0 04mg of narcan with some improvement  Will give another dose of narcan     Send UDS

## 2018-03-10 NOTE — PROGRESS NOTES
Dr Juanis Wolfe at patient bedside to re-evaluate  Patient continues to remain lethargic  Arouses to sternal rub  VSS  No further orders received at this time  Will continue to closely monitor

## 2018-03-17 LAB
AMPHETAMINES UR QL SCN: NEGATIVE NG/ML
BARBITURATES UR QL SCN: NEGATIVE NG/ML
BENZODIAZ UR QL SCN: NEGATIVE NG/ML
BZE UR QL SCN: NEGATIVE NG/ML
CANNABINOIDS UR QL SCN: NEGATIVE NG/ML
METHADONE UR QL SCN: NEGATIVE NG/ML
OPIATES UR QL: NEGATIVE
PCP UR QL: NEGATIVE NG/ML
PROPOXYPH UR QL: NEGATIVE NG/ML

## 2018-04-05 ENCOUNTER — OFFICE VISIT (OUTPATIENT)
Dept: NEUROLOGY | Facility: CLINIC | Age: 43
End: 2018-04-05
Payer: COMMERCIAL

## 2018-04-05 VITALS
BODY MASS INDEX: 31.18 KG/M2 | RESPIRATION RATE: 18 BRPM | HEIGHT: 66 IN | SYSTOLIC BLOOD PRESSURE: 114 MMHG | DIASTOLIC BLOOD PRESSURE: 86 MMHG | HEART RATE: 105 BPM | WEIGHT: 194 LBS

## 2018-04-05 DIAGNOSIS — F41.9 ANXIETY: ICD-10-CM

## 2018-04-05 DIAGNOSIS — G40.909 NONINTRACTABLE EPILEPSY WITHOUT STATUS EPILEPTICUS, UNSPECIFIED EPILEPSY TYPE (HCC): Primary | Chronic | ICD-10-CM

## 2018-04-05 PROCEDURE — 99214 OFFICE O/P EST MOD 30 MIN: CPT | Performed by: PSYCHIATRY & NEUROLOGY

## 2018-04-05 RX ORDER — ONDANSETRON 8 MG/1
8 TABLET, ORALLY DISINTEGRATING ORAL EVERY 8 HOURS PRN
Refills: 5 | COMMUNITY
Start: 2018-03-02 | End: 2018-05-29 | Stop reason: ALTCHOICE

## 2018-04-05 RX ORDER — OMEPRAZOLE 40 MG/1
CAPSULE, DELAYED RELEASE ORAL
Refills: 5 | COMMUNITY
Start: 2018-03-02 | End: 2018-05-29 | Stop reason: ALTCHOICE

## 2018-04-05 RX ORDER — LEVETIRACETAM 500 MG/1
500 TABLET ORAL DAILY
COMMUNITY
Start: 2016-09-30 | End: 2018-04-05 | Stop reason: SDUPTHER

## 2018-04-05 RX ORDER — LEVETIRACETAM 500 MG/1
1000 TABLET ORAL EVERY 12 HOURS SCHEDULED
Qty: 120 TABLET | Refills: 11 | Status: SHIPPED | OUTPATIENT
Start: 2018-04-05 | End: 2018-05-29 | Stop reason: ALTCHOICE

## 2018-04-05 RX ORDER — ACETAMINOPHEN AND CODEINE PHOSPHATE 300; 30 MG/1; MG/1
TABLET ORAL
Refills: 1 | COMMUNITY
Start: 2018-03-26

## 2018-04-05 NOTE — PATIENT INSTRUCTIONS
-- Please increase your Levetiracetam (Keppra) to 500 mg twice a day for 1 week,    - Then take Levetiracetam 1000 mg (2 tabs) twice a day  -- Please get a routine EEG before your next appointment  -- Call [place] [employer] and ask them to send us your medical release forms  We can fill these out  If you have any additional events of losing consciousness, you cannot drive for 6 months after that event

## 2018-04-05 NOTE — ASSESSMENT & PLAN NOTE
She has not yet reestablished with a psychiatrist, but is in the process of doing so  I strongly encouraged her to reestablish care to manage her mental health

## 2018-04-05 NOTE — ASSESSMENT & PLAN NOTE
She has not had any recurrent episodes concerning for seizures since her last appointment  Her last episode was in May of 2017  Although she has not had any other episodes, I am concerned that she is only taking levetiracetam 500 milligrams a day  I asked her to increase her dose to 1000 milligrams twice a day, as previously instructed  Her current dose is likely not enough to be effective to treat epilepsy  Overall, his difficult to say for sure if her episodes were epileptic seizures or not  She has not gotten EEGs which have been ordered in the past   I discussed that he either way at this point, it may be best to continue to be on levetiracetam in order to avoid any further seizures  It is possible that her events were nonepileptic, but I would like to get an EEG at least to better assess her risk of having recurrent seizure  --she will increase her levetiracetam to 500 milligrams twice a day for 1 week and then take 1000 milligrams twice a day  I discussed the risks rationale this medication  --I will have her get a routine EEG before her next visit  --at this point, she has not had any episodes concerning for seizures in nearly a year  I discussed that we would be able to fill out her medical clearance forms, but the issue with her license being suspended for a full year is something that we may not be able to change
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
## 2018-05-29 ENCOUNTER — HOSPITAL ENCOUNTER (EMERGENCY)
Facility: HOSPITAL | Age: 43
Discharge: HOME | End: 2018-05-29
Attending: EMERGENCY MEDICINE | Admitting: EMERGENCY MEDICINE
Payer: COMMERCIAL

## 2018-05-29 ENCOUNTER — APPOINTMENT (EMERGENCY)
Dept: RADIOLOGY | Facility: HOSPITAL | Age: 43
End: 2018-05-29
Payer: COMMERCIAL

## 2018-05-29 VITALS
TEMPERATURE: 98.4 F | SYSTOLIC BLOOD PRESSURE: 121 MMHG | RESPIRATION RATE: 16 BRPM | HEIGHT: 65 IN | DIASTOLIC BLOOD PRESSURE: 74 MMHG | BODY MASS INDEX: 31.32 KG/M2 | OXYGEN SATURATION: 99 % | WEIGHT: 188 LBS | HEART RATE: 97 BPM

## 2018-05-29 DIAGNOSIS — S92.345A CLOSED NONDISPLACED FRACTURE OF FOURTH METATARSAL BONE OF LEFT FOOT, INITIAL ENCOUNTER: ICD-10-CM

## 2018-05-29 DIAGNOSIS — S93.409A SPRAIN OF LIGAMENT OF ANKLE, INITIAL ENCOUNTER: Primary | ICD-10-CM

## 2018-05-29 PROCEDURE — 73630 X-RAY EXAM OF FOOT: CPT

## 2018-05-29 PROCEDURE — 73610 X-RAY EXAM OF ANKLE: CPT

## 2018-05-29 PROCEDURE — 99283 EMERGENCY DEPT VISIT LOW MDM: CPT

## 2018-05-29 RX ORDER — ACETAMINOPHEN AND CODEINE PHOSPHATE 300; 30 MG/1; MG/1
1-2 TABLET ORAL EVERY 6 HOURS PRN
Qty: 15 TABLET | Refills: 0 | Status: SHIPPED | OUTPATIENT
Start: 2018-05-29 | End: 2018-06-08

## 2018-05-29 NOTE — ED PROVIDER NOTES
History  Chief Complaint   Patient presents with    Ankle Injury     Pt reports she slipped walking down basement stairs falling down 5 steps onto her feet, c/o pain to b/l ankles denies hitting head, denies LOC, on eliquis      Pt tripped going down 5 stairs, injured both feet/ankles; history of surgery R foot; L foot most tender over metatarsal heads        History provided by:  Patient   used: No    Ankle Injury   Location:  Both ankles  Severity:  Moderate  Onset quality:  Sudden  Duration:  1 hour  Timing:  Constant  Progression:  Worsening  Chronicity:  New  Context:  Tripped on stairs      Prior to Admission Medications   Prescriptions Last Dose Informant Patient Reported?  Taking?   acetaminophen-codeine (TYLENOL #3) 300-30 mg per tablet   Yes No   Sig: TAKE 1 - 2 TABLETS BY MOUTH EVERY 6 HOURS LIMIT 8 TABLETS PER DAY   apixaban (ELIQUIS) 5 mg   No No   Sig: Take 2 tabs twice a day for 3 days then 1 tab twice a day   baclofen 20 mg tablet   Yes No   Sig: Take 20 mg by mouth 2 (two) times a day   busPIRone (BUSPAR) 15 mg tablet   Yes No   Sig: Take 15 mg by mouth 3 (three) times a day   gabapentin (NEURONTIN) 800 mg tablet   Yes No   Sig: Take 1 tablet by mouth 4 (four) times a day     hydrOXYzine pamoate (VISTARIL) 50 mg capsule   Yes No   Sig: Take 1 capsule by mouth 3 (three) times a day   rOPINIRole (REQUIP) 4 mg tablet   Yes No   Sig: Take 8 mg by mouth daily at bedtime        Facility-Administered Medications: None       Past Medical History:   Diagnosis Date    Arthritis 10/23/2015    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    Chronic pain     Closed dislocation of tarsometatarsal joint 2/3/2015    Closed fracture of multiple ribs 3/25/2017    Closed fracture of phalanx of foot 3/25/2017    Closed fracture of tarsal and metatarsal bones 3/25/2017    Complex regional pain syndrome type 1 of lower extremity 4/12/2016    Depression     History of pulmonary embolism 3/25/2017    Iron deficiency anemia     Pulmonary embolism (HCC)     Seizures (HCC)     Treatment of healed fracture follow-up examination 3/25/2017       Past Surgical History:   Procedure Laterality Date    FOOT SURGERY      GASTRECTOMY  2006    GASTRIC BYPASS      HERNIA REPAIR      HYSTERECTOMY         Family History   Problem Relation Age of Onset    Heart disease Mother     Cancer Mother     Lung disease Maternal Grandmother      I have reviewed and agree with the history as documented  Social History   Substance Use Topics    Smoking status: Current Every Day Smoker     Packs/day: 0 50     Years: 25 00    Smokeless tobacco: Never Used    Alcohol use No        Review of Systems   Musculoskeletal: Positive for arthralgias (both feet, ankles)  All other systems reviewed and are negative  Physical Exam  Physical Exam   Constitutional: She appears well-developed and well-nourished  HENT:   Nose: Nose normal    Eyes: Pupils are equal, round, and reactive to light  Neck: Normal range of motion  Cardiovascular: Normal rate  Pulmonary/Chest: Effort normal    Abdominal: Soft  Musculoskeletal:        Feet:    Slight swelling, tenderness L foot   Skin: Skin is warm and dry  Psychiatric: She has a normal mood and affect  Her behavior is normal  Judgment and thought content normal    Nursing note and vitals reviewed        Vital Signs  ED Triage Vitals   Temperature Pulse Respirations Blood Pressure SpO2   05/29/18 0742 05/29/18 0742 05/29/18 0742 05/29/18 0742 05/29/18 0742   98 4 °F (36 9 °C) 98 16 126/80 99 %      Temp Source Heart Rate Source Patient Position - Orthostatic VS BP Location FiO2 (%)   05/29/18 0742 05/29/18 0742 05/29/18 0742 05/29/18 0742 --   Tympanic Monitor Lying Right arm       Pain Score       05/29/18 0739       9           Vitals:    05/29/18 0742 05/29/18 0830 05/29/18 0900   BP: 126/80 119/60 121/74   Pulse: 98 96 97   Patient Position - Orthostatic VS: Lying Lying Lying Visual Acuity      ED Medications  Medications - No data to display    Diagnostic Studies  Results Reviewed     None                 XR ankle 3+ views LEFT   Final Result by Susy Holcomb MD (05/29 4383)      No acute osseous abnormality  Workstation performed: [id number]         XR foot 3+ views LEFT   Final Result by Susy Holcomb MD (05/29 3987)      Subtle irregularity of 4th distal metatarsal neck suspicious for possible nondisplaced fracture  No direct evidence of fracture line noted  Erosion of the lateral aspect of 5th metatarsal head with overlying soft tissue swelling, indeterminate but possibly representing crystal deposition arthropathy such as gout  I personally discussed this study with Kary Stewart on 5/29/2018 at 8:51 AM                Workstation performed: [id number]         XR foot 3+ views RIGHT   Final Result by Susy Holcomb MD (05/29 9054)      No acute osseous abnormality  Surgical changes  Workstation performed: [id number]                    Procedures  Orthopedic Injury  Date/Time: 5/29/2018 4:13 PM  Performed by: Shagufta Dupree  Authorized by: Shagufta Dupree   Consent: Verbal consent obtained    Injury location: foot  Location details: left foot  Injury type: fracture  Fracture type: fourth metatarsal  Pre-procedure neurovascular assessment: neurovascularly intact  Immobilization: ace wrap  Post-procedure neurovascular assessment: post-procedure neurovascularly intact  Patient tolerance: Patient tolerated the procedure well with no immediate complications             Phone Contacts  ED Phone Contact    ED Course                               MDM  Number of Diagnoses or Management Options  Closed nondisplaced fracture of fourth metatarsal bone of left foot, initial encounter: new and requires workup  Sprain of ligament of ankle, initial encounter: new and requires workup     Amount and/or Complexity of Data Reviewed  Tests in the radiology section of CPT®: ordered and reviewed    Patient Progress  Patient progress: improved    CritCare Time    Disposition  Final diagnoses:   Sprain of ligament of ankle, initial encounter   Closed nondisplaced fracture of fourth metatarsal bone of left foot, initial encounter     Time reflects when diagnosis was documented in both MDM as applicable and the Disposition within this note     Time User Action Codes Description Comment    5/29/2018  8:47 AM Atul JONES Add [S93 409A] Sprain of ligament of ankle, initial encounter     5/29/2018  8:52 AM Red Russell Add [S92 [id number]] Closed nondisplaced fracture of fourth metatarsal bone of left foot, initial encounter       ED Disposition     ED Disposition Condition Comment    Discharge  Nely Ege discharge to home/self care  Condition at discharge: Stable        Follow-up Information     Follow up With Specialties Details Why Contact Info    Orion Oliva MD  Call in 2 days for follow-up [address] [place] [id number]  [phone]            Discharge Medication List as of 5/29/2018  8:57 AM      START taking these medications    Details   !! acetaminophen-codeine (TYLENOL #3) 300-30 mg per tablet Take 1-2 tablets by mouth every 6 (six) hours as needed for moderate pain for up to 10 days, Starting Tue 5/29/2018, Until Fri 6/8/2018, Print       !! - Potential duplicate medications found  Please discuss with provider        CONTINUE these medications which have NOT CHANGED    Details   !! acetaminophen-codeine (TYLENOL #3) 300-30 mg per tablet TAKE 1 - 2 TABLETS BY MOUTH EVERY 6 HOURS LIMIT 8 TABLETS PER DAY, Historical Med      apixaban (ELIQUIS) 5 mg Take 2 tabs twice a day for 3 days then 1 tab twice a day, Normal      baclofen 20 mg tablet Take 20 mg by mouth 2 (two) times a day, Until Discontinued, Historical Med      busPIRone (BUSPAR) 15 mg tablet Take 15 mg by mouth 3 (three) times a day, Until Discontinued, Historical Med      gabapentin (NEURONTIN) 800 mg tablet Take 1 tablet by mouth 4 (four) times a day  , Historical Med      hydrOXYzine pamoate (VISTARIL) 50 mg capsule Take 1 capsule by mouth 3 (three) times a day, Starting Thu 9/1/2016, Historical Med      rOPINIRole (REQUIP) 4 mg tablet Take 8 mg by mouth daily at bedtime  , Historical Med       !! - Potential duplicate medications found  Please discuss with provider  No discharge procedures on file      ED Provider  Electronically Signed by           Muna Theodore MD  05/29/18 8632

## 2018-05-29 NOTE — ED NOTES
Pt provided with crutches and instruction for use, pt performed return demonstration        Mateo Atkins RN  05/29/18 0533

## 2018-05-29 NOTE — DISCHARGE INSTRUCTIONS
Ankle Sprain   WHAT YOU NEED TO KNOW:   An ankle sprain happens when 1 or more ligaments in your ankle joint stretch or tear  Ligaments are tough tissues that connect bones  Ligaments support your joints and keep your bones in place  DISCHARGE INSTRUCTIONS:   Return to the emergency department if:   · You have severe pain in your ankle  · Your foot or toes are cold or numb  · Your ankle becomes more weak or unstable (wobbly)  · You are unable to put any weight on your ankle or foot  · Your swelling has increased or returned  Contact your healthcare provider if:   · Your pain does not go away, even after treatment  · You have questions or concerns about your condition or care  Medicines: You may need any of the following:  · NSAIDs , such as ibuprofen, help decrease swelling, pain, and fever  This medicine is available with or without a doctor's order  NSAIDs can cause stomach bleeding or kidney problems in certain people  If you take blood thinner medicine, always ask your healthcare provider if NSAIDs are safe for you  Always read the medicine label and follow directions  · Acetaminophen  decreases pain  It is available without a doctor's order  Ask how much to take and how often to take it  Follow directions  Acetaminophen can cause liver damage if not taken correctly  · Prescription pain medicine  may be given  Ask how to take this medicine safely  · Take your medicine as directed  Contact your healthcare provider if you think your medicine is not helping or if you have side effects  Tell him or her if you are allergic to any medicine  Keep a list of the medicines, vitamins, and herbs you take  Include the amounts, and when and why you take them  Bring the list or the pill bottles to follow-up visits  Carry your medicine list with you in case of an emergency    Self care:   · Use support devices,  such as a brace, cast, or splint, may be needed to limit your movement and protect your joint  You may need to use crutches to decrease your pain as you move around  · Go to physical therapy as directed  A physical therapist teaches you exercises to help improve movement and strength, and to decrease pain  · Rest  your ankle so that it can heal  Return to normal activities as directed  · Apply ice on your ankle for 15 to 20 minutes every hour or as directed  Use an ice pack, or put crushed ice in a plastic bag  Cover it with a towel  Ice helps prevent tissue damage and decreases swelling and pain  · Compress  your ankle  Ask if you should wrap an elastic bandage around your injured ligament  An elastic bandage provides support and helps decrease swelling and movement so your joint can heal  Wear as long as directed  · Elevate  your ankle above the level of your heart as often as you can  This will help decrease swelling and pain  Prop your ankle on pillows or blankets to keep it elevated comfortably  Prevent another ankle sprain:   · Let your ankle heal   Find out how long your ligament needs to heal  Do not do any physical activity until your healthcare provider says it is okay  If you start activity too soon, you may develop a more serious injury  · Always warm up and stretch  before you exercise or play sports  · Use the right equipment  Always wear shoes that fit well and are made for the activity that you are doing  You may also need ankle supports, elbow and knee pads, or braces  Follow up with your healthcare provider as directed:  Write down your questions so you remember to ask them during your visits  © 2017 [address] [employer] [employer] Information is for End User's use only and may not be sold, redistributed or otherwise used for commercial purposes  All illustrations and images included in CareNotes® are the copyrighted property of [employer]  or Miguel Waldron  The above information is an  only   It is not intended as medical advice for individual conditions or treatments  Talk to your doctor, nurse or pharmacist before following any medical regimen to see if it is safe and effective for you  Ace, rest, ice, elevation, use walker, take medications as usual, recheck with PCP as needed    Foot Fracture in Adults   WHAT [place]:   A foot fracture is a break in one or more of the bones in your foot  Foot fractures are commonly caused by trauma, falls, or repeated stress injuries  DISCHARGE INSTRUCTIONS:   Medicines:   · Antibiotics: This medicine is given to help treat or prevent an infection caused by bacteria  · NSAIDs:  These medicines decrease swelling and pain  NSAIDs are available without a doctor's order  Ask which medicine is right for you  Ask how much to take and when to take it  Take as directed  NSAIDs can cause stomach bleeding and kidney problems if not taken correctly  · Pain medicine: You may be given a prescription medicine to decrease pain  Do not wait until the pain is severe before you take this medicine  · Take your medicine as directed  Contact your healthcare provider if you think your medicine is not helping or if you have side effects  Tell him of her if you are allergic to any medicine  Keep a list of the medicines, vitamins, and herbs you take  Include the amounts, and when and why you take them  Bring the list or the pill bottles to follow-up visits  Carry your medicine list with you in case of an emergency  Follow up with your healthcare provider or bone specialist as directed: You may need to return to have your splint or stitches removed  You may also need to return for tests to make sure your foot is healing  Write down your questions so you remember to ask them during your visits  Wound care:  Carefully wash the wound with soap and water  Dry the area and put on new, clean bandages as directed  Change your bandages when they get wet or dirty    Self-care:   · Rest:  You may need to rest your foot and avoid activities that cause pain  For stress fractures, you will need to avoid the activity that caused the fracture until it heals  Ask when you can return to your normal activities such as work and sports  · Ice:  Ice helps decrease swelling and pain  Ice may also help prevent tissue damage  Use an ice pack or put crushed ice in a plastic bag  Cover it with a towel, and place it on your foot for 15 to 20 minutes every hour as directed  · Elevate your foot:  Raise your foot at or above the level of your heart as often as you can  This will help decrease swelling and pain  Prop your foot on pillows or blankets to keep it elevated comfortably  · Physical therapy: Once your foot has healed, a physical therapist can teach you exercises to help improve movement and strength, and to decrease pain  Splint care:   · Check the skin around your splint daily for any redness or open areas  · Do not use a sharp or pointed object to scratch your skin under the splint  · Do not remove your splint unless your healthcare provider or orthopedic surgeon says it is okay  Bathing with a splint:  Do not let your splint get wet  Before bathing, cover the splint with a plastic bag  Tape the bag to your skin above the splint to seal out the water  Keep your foot out of the water in case the bag leaks  Ask when it is okay to take a bath or shower  Assistive devices: You may be given a hard-soled shoe to wear while your foot is healing  You also may need to use crutches to help you walk while your foot heals  It is important to use your crutches correctly  Ask for more information about how to use crutches  Contact your healthcare provider or bone specialist if:   · You have a fever  · You have new sores around your boot or splint  · You have new or worsening trouble moving your foot  · You notice a foul smell coming from under your splint      · Your boot or splint gets damaged  · You have questions or concerns about your condition or care  Return to the emergency department if:   · The pain in your injured foot gets worse even after you rest and take pain medicine  · The skin or toes of your foot become numb, swollen, cold, white, or blue  · You have more pain or swelling than you did before the splint was put on  · Your wound is draining fluid or pus  · Blood soaks through your bandage  · Your leg feels warm, tender, and painful  It may look swollen and red  · You suddenly feel lightheaded and short of breath  · You have chest pain when you take a deep breath or cough  You may cough up blood  © 2017 [address] [employer] [employer] Information is for End User's use only and may not be sold, redistributed or otherwise used for commercial purposes  All illustrations and images included in CareNotes® are the copyrighted property of [employer] Inc  or [employer]  The above information is an  only  It is not intended as medical advice for individual conditions or treatments  Talk to your doctor, nurse or pharmacist before following any medical regimen to see if it is safe and effective for you      Ace, post-op shoe, rest, ice, elevation, Tylenol No 3 for pain, call [phone] for follow-up

## 2018-05-30 ENCOUNTER — TELEPHONE (OUTPATIENT)
Dept: NEUROLOGY | Facility: CLINIC | Age: 43
End: 2018-05-30

## 2018-08-14 ENCOUNTER — TELEPHONE (OUTPATIENT)
Dept: NEUROLOGY | Facility: CLINIC | Age: 43
End: 2018-08-14

## 2018-08-14 NOTE — TELEPHONE ENCOUNTER
Pt calls to state that Nima has not received seizure reporting forms  Forms were faxed 7/26 per another encounter  Pt asking to refax to [facility] [phone]  Forms faxed

## 2020-08-20 NOTE — TELEPHONE ENCOUNTER
Attempted to call patient to confirm date of last seizure as we received [employer] forms  Number is out of service  Letter mailed home to contact office 
[facility]
Forms completed and in your folder for signature
Forms signed
pt called and states viry did not receive forms  i made her aware that we tried to contact her for last seizure date and forms were not completed yet  she confirmed that last seizure was in may 2017 
20-Aug-2020 01:54

## 2022-06-18 ENCOUNTER — APPOINTMENT (OUTPATIENT)
Dept: RADIOLOGY | Facility: CLINIC | Age: 47
End: 2022-06-18
Payer: COMMERCIAL

## 2022-06-18 ENCOUNTER — OFFICE VISIT (OUTPATIENT)
Dept: URGENT CARE | Facility: CLINIC | Age: 47
End: 2022-06-18
Payer: COMMERCIAL

## 2022-06-18 VITALS
OXYGEN SATURATION: 99 % | SYSTOLIC BLOOD PRESSURE: 140 MMHG | WEIGHT: 199 LBS | HEART RATE: 87 BPM | DIASTOLIC BLOOD PRESSURE: 80 MMHG | BODY MASS INDEX: 33.15 KG/M2 | TEMPERATURE: 97.8 F | HEIGHT: 65 IN | RESPIRATION RATE: 20 BRPM

## 2022-06-18 DIAGNOSIS — S43.401A SPRAIN OF RIGHT SHOULDER, UNSPECIFIED SHOULDER SPRAIN TYPE, INITIAL ENCOUNTER: ICD-10-CM

## 2022-06-18 DIAGNOSIS — S63.501A SPRAIN OF RIGHT WRIST, INITIAL ENCOUNTER: ICD-10-CM

## 2022-06-18 DIAGNOSIS — T14.8XXA BRUISING: ICD-10-CM

## 2022-06-18 DIAGNOSIS — S63.501A SPRAIN OF RIGHT WRIST, INITIAL ENCOUNTER: Primary | ICD-10-CM

## 2022-06-18 PROCEDURE — 73030 X-RAY EXAM OF SHOULDER: CPT

## 2022-06-18 PROCEDURE — 29125 APPL SHORT ARM SPLINT STATIC: CPT | Performed by: PHYSICIAN ASSISTANT

## 2022-06-18 PROCEDURE — G0382 LEV 3 HOSP TYPE B ED VISIT: HCPCS | Performed by: PHYSICIAN ASSISTANT

## 2022-06-18 PROCEDURE — 73110 X-RAY EXAM OF WRIST: CPT

## 2022-06-18 RX ORDER — TRAMADOL HYDROCHLORIDE 50 MG/1
TABLET ORAL
COMMUNITY
Start: 2022-06-16

## 2022-06-18 RX ORDER — SUMATRIPTAN 25 MG/1
TABLET, FILM COATED ORAL
COMMUNITY
Start: 2022-06-13

## 2022-06-18 RX ORDER — PROPRANOLOL HCL 60 MG
CAPSULE, EXTENDED RELEASE 24HR ORAL
COMMUNITY
Start: 2022-06-15

## 2022-06-18 RX ORDER — VENLAFAXINE 37.5 MG/1
150 TABLET ORAL
COMMUNITY
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
## 2024-05-03 ENCOUNTER — APPOINTMENT (EMERGENCY)
Dept: CT IMAGING | Facility: HOSPITAL | Age: 49
End: 2024-05-03
Payer: COMMERCIAL

## 2024-05-03 ENCOUNTER — APPOINTMENT (EMERGENCY)
Dept: RADIOLOGY | Facility: HOSPITAL | Age: 49
End: 2024-05-03
Payer: COMMERCIAL

## 2024-05-03 ENCOUNTER — HOSPITAL ENCOUNTER (EMERGENCY)
Facility: HOSPITAL | Age: 49
Discharge: HOME | End: 2024-05-04
Attending: EMERGENCY MEDICINE | Admitting: EMERGENCY MEDICINE
Payer: COMMERCIAL

## 2024-05-03 DIAGNOSIS — G43.909 MIGRAINE HEADACHE: ICD-10-CM

## 2024-05-03 DIAGNOSIS — R06.02 SOB (SHORTNESS OF BREATH): ICD-10-CM

## 2024-05-03 DIAGNOSIS — R53.1 GENERALIZED WEAKNESS: Primary | ICD-10-CM

## 2024-05-03 DIAGNOSIS — S22.49XA MULTIPLE RIB FRACTURES: ICD-10-CM

## 2024-05-03 LAB
ALBUMIN SERPL BCP-MCNC: 3.8 G/DL (ref 3.5–5)
ALP SERPL-CCNC: 96 U/L (ref 34–104)
ALT SERPL W P-5'-P-CCNC: 7 U/L (ref 7–52)
AMORPH URATE CRY URNS QL MICRO: NORMAL
ANION GAP SERPL CALCULATED.3IONS-SCNC: 6 MMOL/L (ref 4–13)
APTT PPP: 27 SECONDS (ref 23–37)
AST SERPL W P-5'-P-CCNC: 14 U/L (ref 13–39)
BACTERIA UR QL AUTO: NORMAL /HPF
BASOPHILS # BLD AUTO: 0.09 THOUSANDS/ÂΜL (ref 0–0.1)
BASOPHILS NFR BLD AUTO: 1 % (ref 0–1)
BILIRUB SERPL-MCNC: 0.23 MG/DL (ref 0.2–1)
BILIRUB UR QL STRIP: NEGATIVE
BUN SERPL-MCNC: 14 MG/DL (ref 5–25)
CALCIUM SERPL-MCNC: 8.9 MG/DL (ref 8.4–10.2)
CARDIAC TROPONIN I PNL SERPL HS: 2 NG/L
CHLORIDE SERPL-SCNC: 104 MMOL/L (ref 96–108)
CLARITY UR: ABNORMAL
CO2 SERPL-SCNC: 25 MMOL/L (ref 21–32)
COLOR UR: YELLOW
CREAT SERPL-MCNC: 0.54 MG/DL (ref 0.6–1.3)
D DIMER PPP FEU-MCNC: 1.25 UG/ML FEU
EOSINOPHIL # BLD AUTO: 0.35 THOUSAND/ÂΜL (ref 0–0.61)
EOSINOPHIL NFR BLD AUTO: 3 % (ref 0–6)
ERYTHROCYTE [DISTWIDTH] IN BLOOD BY AUTOMATED COUNT: 23.9 % (ref 11.6–15.1)
GFR SERPL CREATININE-BSD FRML MDRD: 113 ML/MIN/1.73SQ M
GLUCOSE SERPL-MCNC: 109 MG/DL (ref 65–140)
GLUCOSE UR STRIP-MCNC: NEGATIVE MG/DL
HCT VFR BLD AUTO: 31.4 % (ref 34.8–46.1)
HGB BLD-MCNC: 8.8 G/DL (ref 11.5–15.4)
HGB UR QL STRIP.AUTO: NEGATIVE
IMM GRANULOCYTES # BLD AUTO: 0.06 THOUSAND/UL (ref 0–0.2)
IMM GRANULOCYTES NFR BLD AUTO: 1 % (ref 0–2)
INR PPP: 0.92 (ref 0.84–1.19)
KETONES UR STRIP-MCNC: NEGATIVE MG/DL
LEUKOCYTE ESTERASE UR QL STRIP: NEGATIVE
LYMPHOCYTES # BLD AUTO: 1.92 THOUSANDS/ÂΜL (ref 0.6–4.47)
LYMPHOCYTES NFR BLD AUTO: 18 % (ref 14–44)
MCH RBC QN AUTO: 19.9 PG (ref 26.8–34.3)
MCHC RBC AUTO-ENTMCNC: 28 G/DL (ref 31.4–37.4)
MCV RBC AUTO: 71 FL (ref 82–98)
MONOCYTES # BLD AUTO: 0.7 THOUSAND/ÂΜL (ref 0.17–1.22)
MONOCYTES NFR BLD AUTO: 7 % (ref 4–12)
NEUTROPHILS # BLD AUTO: 7.63 THOUSANDS/ÂΜL (ref 1.85–7.62)
NEUTS SEG NFR BLD AUTO: 70 % (ref 43–75)
NITRITE UR QL STRIP: NEGATIVE
NON-SQ EPI CELLS URNS QL MICRO: NORMAL /HPF
NRBC BLD AUTO-RTO: 0 /100 WBCS
PH UR STRIP.AUTO: 6.5 [PH]
PLATELET # BLD AUTO: 299 THOUSANDS/UL (ref 149–390)
PMV BLD AUTO: 9.4 FL (ref 8.9–12.7)
POTASSIUM SERPL-SCNC: 4 MMOL/L (ref 3.5–5.3)
PROT SERPL-MCNC: 7.3 G/DL (ref 6.4–8.4)
PROT UR STRIP-MCNC: ABNORMAL MG/DL
PROTHROMBIN TIME: 12.7 SECONDS (ref 11.6–14.5)
RBC # BLD AUTO: 4.43 MILLION/UL (ref 3.81–5.12)
RBC #/AREA URNS AUTO: NORMAL /HPF
SODIUM SERPL-SCNC: 135 MMOL/L (ref 135–147)
SP GR UR STRIP.AUTO: 1.02 (ref 1–1.03)
UROBILINOGEN UR STRIP-ACNC: 2 MG/DL
WBC # BLD AUTO: 10.75 THOUSAND/UL (ref 4.31–10.16)
WBC #/AREA URNS AUTO: NORMAL /HPF

## 2024-05-03 PROCEDURE — 85610 PROTHROMBIN TIME: CPT

## 2024-05-03 PROCEDURE — 81001 URINALYSIS AUTO W/SCOPE: CPT

## 2024-05-03 PROCEDURE — 36415 COLL VENOUS BLD VENIPUNCTURE: CPT

## 2024-05-03 PROCEDURE — 99285 EMERGENCY DEPT VISIT HI MDM: CPT

## 2024-05-03 PROCEDURE — 80053 COMPREHEN METABOLIC PANEL: CPT

## 2024-05-03 PROCEDURE — 85379 FIBRIN DEGRADATION QUANT: CPT

## 2024-05-03 PROCEDURE — 85730 THROMBOPLASTIN TIME PARTIAL: CPT

## 2024-05-03 PROCEDURE — 84484 ASSAY OF TROPONIN QUANT: CPT

## 2024-05-03 PROCEDURE — 96365 THER/PROPH/DIAG IV INF INIT: CPT

## 2024-05-03 PROCEDURE — 85025 COMPLETE CBC W/AUTO DIFF WBC: CPT

## 2024-05-03 PROCEDURE — 71275 CT ANGIOGRAPHY CHEST: CPT

## 2024-05-03 PROCEDURE — 96361 HYDRATE IV INFUSION ADD-ON: CPT

## 2024-05-03 PROCEDURE — 96375 TX/PRO/DX INJ NEW DRUG ADDON: CPT

## 2024-05-03 PROCEDURE — 70450 CT HEAD/BRAIN W/O DYE: CPT

## 2024-05-03 PROCEDURE — 71046 X-RAY EXAM CHEST 2 VIEWS: CPT

## 2024-05-03 PROCEDURE — 93005 ELECTROCARDIOGRAM TRACING: CPT

## 2024-05-03 RX ORDER — DIAZEPAM 5 MG/1
5 TABLET ORAL ONCE
Status: COMPLETED | OUTPATIENT
Start: 2024-05-03 | End: 2024-05-03

## 2024-05-03 RX ORDER — DEXAMETHASONE SODIUM PHOSPHATE 10 MG/ML
10 INJECTION, SOLUTION INTRAMUSCULAR; INTRAVENOUS ONCE
Status: COMPLETED | OUTPATIENT
Start: 2024-05-03 | End: 2024-05-03

## 2024-05-03 RX ORDER — METOCLOPRAMIDE HYDROCHLORIDE 5 MG/ML
10 INJECTION INTRAMUSCULAR; INTRAVENOUS ONCE
Status: COMPLETED | OUTPATIENT
Start: 2024-05-03 | End: 2024-05-03

## 2024-05-03 RX ORDER — DIPHENHYDRAMINE HYDROCHLORIDE 50 MG/ML
25 INJECTION INTRAMUSCULAR; INTRAVENOUS ONCE
Status: COMPLETED | OUTPATIENT
Start: 2024-05-03 | End: 2024-05-03

## 2024-05-03 RX ORDER — MAGNESIUM SULFATE HEPTAHYDRATE 40 MG/ML
2 INJECTION, SOLUTION INTRAVENOUS ONCE
Status: COMPLETED | OUTPATIENT
Start: 2024-05-03 | End: 2024-05-03

## 2024-05-03 RX ADMIN — DEXAMETHASONE SODIUM PHOSPHATE 10 MG: 10 INJECTION, SOLUTION INTRAMUSCULAR; INTRAVENOUS at 22:07

## 2024-05-03 RX ADMIN — SODIUM CHLORIDE 1000 ML: 0.9 INJECTION, SOLUTION INTRAVENOUS at 22:05

## 2024-05-03 RX ADMIN — DIAZEPAM 5 MG: 5 TABLET ORAL at 23:07

## 2024-05-03 RX ADMIN — METOCLOPRAMIDE 10 MG: 5 INJECTION, SOLUTION INTRAMUSCULAR; INTRAVENOUS at 22:07

## 2024-05-03 RX ADMIN — MAGNESIUM SULFATE HEPTAHYDRATE 2 G: 2 INJECTION, SOLUTION INTRAVENOUS at 22:05

## 2024-05-03 RX ADMIN — IOHEXOL 90 ML: 350 INJECTION, SOLUTION INTRAVENOUS at 23:48

## 2024-05-03 RX ADMIN — DIPHENHYDRAMINE HYDROCHLORIDE 25 MG: 50 INJECTION, SOLUTION INTRAMUSCULAR; INTRAVENOUS at 22:07

## 2024-05-04 VITALS
DIASTOLIC BLOOD PRESSURE: 62 MMHG | OXYGEN SATURATION: 98 % | SYSTOLIC BLOOD PRESSURE: 125 MMHG | TEMPERATURE: 98.7 F | HEART RATE: 83 BPM | RESPIRATION RATE: 18 BRPM

## 2024-05-04 LAB
ATRIAL RATE: 78 BPM
P AXIS: 45 DEGREES
PR INTERVAL: 130 MS
QRS AXIS: 42 DEGREES
QRSD INTERVAL: 76 MS
QT INTERVAL: 392 MS
QTC INTERVAL: 446 MS
T WAVE AXIS: 26 DEGREES
VENTRICULAR RATE: 78 BPM

## 2024-05-04 PROCEDURE — 93010 ELECTROCARDIOGRAM REPORT: CPT | Performed by: INTERNAL MEDICINE

## 2024-05-04 RX ORDER — OXYCODONE HYDROCHLORIDE 5 MG/1
5 TABLET ORAL EVERY 8 HOURS PRN
Qty: 15 TABLET | Refills: 0 | Status: SHIPPED | OUTPATIENT
Start: 2024-05-04 | End: 2024-05-09

## 2024-05-04 RX ORDER — SENNOSIDES 8.6 MG
650 CAPSULE ORAL EVERY 8 HOURS PRN
Qty: 30 TABLET | Refills: 0 | Status: SHIPPED | OUTPATIENT
Start: 2024-05-04

## 2024-05-04 RX ORDER — LIDOCAINE 50 MG/G
2 PATCH TOPICAL ONCE
Status: DISCONTINUED | OUTPATIENT
Start: 2024-05-04 | End: 2024-05-04 | Stop reason: HOSPADM

## 2024-05-04 RX ORDER — LIDOCAINE 50 MG/G
2 PATCH TOPICAL EVERY 24 HOURS
Qty: 30 PATCH | Refills: 0 | Status: SHIPPED | OUTPATIENT
Start: 2024-05-04 | End: 2024-06-03

## 2024-05-04 RX ORDER — OXYCODONE HYDROCHLORIDE 5 MG/1
5 TABLET ORAL ONCE
Status: COMPLETED | OUTPATIENT
Start: 2024-05-04 | End: 2024-05-04

## 2024-05-04 RX ORDER — ACETAMINOPHEN 325 MG/1
650 TABLET ORAL ONCE
Status: COMPLETED | OUTPATIENT
Start: 2024-05-04 | End: 2024-05-04

## 2024-05-04 RX ORDER — LIDOCAINE 50 MG/G
2 PATCH TOPICAL EVERY 24 HOURS
Qty: 30 PATCH | Refills: 0 | Status: SHIPPED | OUTPATIENT
Start: 2024-05-04 | End: 2024-05-04

## 2024-05-04 RX ADMIN — LIDOCAINE 5% 2 PATCH: 700 PATCH TOPICAL at 01:01

## 2024-05-04 RX ADMIN — OXYCODONE HYDROCHLORIDE 5 MG: 5 TABLET ORAL at 01:01

## 2024-05-04 RX ADMIN — ACETAMINOPHEN 650 MG: 325 TABLET ORAL at 01:01
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
## 2024-05-04 NOTE — DISCHARGE INSTRUCTIONS
Increase your fluids to maintain your hydration.  Change positions slowly.  Use assistance when up and walking around.  Follow-up with Dr. Hamm on Monday for further evaluation of your pain.     Return to the ER if develop fever, severe headache, vision loss, neck stiffness, vomiting, difficulty breathing, severe chest pain, confusion, or lethargy.

## 2024-05-12 ENCOUNTER — HOSPITAL ENCOUNTER (EMERGENCY)
Facility: HOSPITAL | Age: 49
Discharge: HOME | End: 2024-05-12
Attending: EMERGENCY MEDICINE
Payer: COMMERCIAL

## 2024-05-12 ENCOUNTER — APPOINTMENT (OUTPATIENT)
Dept: RADIOLOGY | Facility: HOSPITAL | Age: 49
End: 2024-05-12
Payer: COMMERCIAL

## 2024-05-12 ENCOUNTER — APPOINTMENT (EMERGENCY)
Dept: NON INVASIVE DIAGNOSTICS | Facility: HOSPITAL | Age: 49
End: 2024-05-12
Payer: COMMERCIAL

## 2024-05-12 VITALS
OXYGEN SATURATION: 100 % | RESPIRATION RATE: 18 BRPM | DIASTOLIC BLOOD PRESSURE: 50 MMHG | HEART RATE: 82 BPM | SYSTOLIC BLOOD PRESSURE: 98 MMHG | TEMPERATURE: 98.1 F

## 2024-05-12 DIAGNOSIS — D64.9 ANEMIA: ICD-10-CM

## 2024-05-12 DIAGNOSIS — R60.0 LOWER EXTREMITY EDEMA: Primary | ICD-10-CM

## 2024-05-12 LAB
ALBUMIN SERPL BCP-MCNC: 3.5 G/DL (ref 3.5–5)
ALP SERPL-CCNC: 135 U/L (ref 34–104)
ALT SERPL W P-5'-P-CCNC: 13 U/L (ref 7–52)
ANION GAP SERPL CALCULATED.3IONS-SCNC: 7 MMOL/L (ref 4–13)
APTT PPP: 28 SECONDS (ref 23–37)
AST SERPL W P-5'-P-CCNC: 27 U/L (ref 13–39)
BASOPHILS # BLD AUTO: 0.06 THOUSANDS/ÂΜL (ref 0–0.1)
BASOPHILS NFR BLD AUTO: 1 % (ref 0–1)
BILIRUB SERPL-MCNC: 0.27 MG/DL (ref 0.2–1)
BUN SERPL-MCNC: 10 MG/DL (ref 5–25)
CALCIUM SERPL-MCNC: 8.7 MG/DL (ref 8.4–10.2)
CARDIAC TROPONIN I PNL SERPL HS: <2 NG/L
CHLORIDE SERPL-SCNC: 107 MMOL/L (ref 96–108)
CO2 SERPL-SCNC: 23 MMOL/L (ref 21–32)
CREAT SERPL-MCNC: 0.51 MG/DL (ref 0.6–1.3)
EOSINOPHIL # BLD AUTO: 0.19 THOUSAND/ÂΜL (ref 0–0.61)
EOSINOPHIL NFR BLD AUTO: 2 % (ref 0–6)
ERYTHROCYTE [DISTWIDTH] IN BLOOD BY AUTOMATED COUNT: 24 % (ref 11.6–15.1)
GFR SERPL CREATININE-BSD FRML MDRD: 115 ML/MIN/1.73SQ M
GLUCOSE SERPL-MCNC: 106 MG/DL (ref 65–140)
HCT VFR BLD AUTO: 29.3 % (ref 34.8–46.1)
HGB BLD-MCNC: 7.9 G/DL (ref 11.5–15.4)
IMM GRANULOCYTES # BLD AUTO: 0.04 THOUSAND/UL (ref 0–0.2)
IMM GRANULOCYTES NFR BLD AUTO: 1 % (ref 0–2)
INR PPP: 0.92 (ref 0.84–1.19)
LYMPHOCYTES # BLD AUTO: 1.74 THOUSANDS/ÂΜL (ref 0.6–4.47)
LYMPHOCYTES NFR BLD AUTO: 21 % (ref 14–44)
MCH RBC QN AUTO: 19.7 PG (ref 26.8–34.3)
MCHC RBC AUTO-ENTMCNC: 27 G/DL (ref 31.4–37.4)
MCV RBC AUTO: 73 FL (ref 82–98)
MONOCYTES # BLD AUTO: 0.62 THOUSAND/ÂΜL (ref 0.17–1.22)
MONOCYTES NFR BLD AUTO: 8 % (ref 4–12)
NEUTROPHILS # BLD AUTO: 5.49 THOUSANDS/ÂΜL (ref 1.85–7.62)
NEUTS SEG NFR BLD AUTO: 67 % (ref 43–75)
NRBC BLD AUTO-RTO: 0 /100 WBCS
PLATELET # BLD AUTO: 423 THOUSANDS/UL (ref 149–390)
PMV BLD AUTO: 8.5 FL (ref 8.9–12.7)
POTASSIUM SERPL-SCNC: 4.8 MMOL/L (ref 3.5–5.3)
PROT SERPL-MCNC: 6.8 G/DL (ref 6.4–8.4)
PROTHROMBIN TIME: 12.8 SECONDS (ref 11.6–14.5)
RBC # BLD AUTO: 4.01 MILLION/UL (ref 3.81–5.12)
SODIUM SERPL-SCNC: 137 MMOL/L (ref 135–147)
WBC # BLD AUTO: 8.14 THOUSAND/UL (ref 4.31–10.16)

## 2024-05-12 PROCEDURE — 85730 THROMBOPLASTIN TIME PARTIAL: CPT | Performed by: EMERGENCY MEDICINE

## 2024-05-12 PROCEDURE — 80053 COMPREHEN METABOLIC PANEL: CPT | Performed by: EMERGENCY MEDICINE

## 2024-05-12 PROCEDURE — 85025 COMPLETE CBC W/AUTO DIFF WBC: CPT | Performed by: EMERGENCY MEDICINE

## 2024-05-12 PROCEDURE — 36415 COLL VENOUS BLD VENIPUNCTURE: CPT

## 2024-05-12 PROCEDURE — 99284 EMERGENCY DEPT VISIT MOD MDM: CPT | Performed by: EMERGENCY MEDICINE

## 2024-05-12 PROCEDURE — 85610 PROTHROMBIN TIME: CPT | Performed by: EMERGENCY MEDICINE

## 2024-05-12 PROCEDURE — 93005 ELECTROCARDIOGRAM TRACING: CPT

## 2024-05-12 PROCEDURE — 99285 EMERGENCY DEPT VISIT HI MDM: CPT

## 2024-05-12 PROCEDURE — 93971 EXTREMITY STUDY: CPT

## 2024-05-12 PROCEDURE — 71046 X-RAY EXAM CHEST 2 VIEWS: CPT

## 2024-05-12 PROCEDURE — 84484 ASSAY OF TROPONIN QUANT: CPT | Performed by: EMERGENCY MEDICINE

## 2024-05-12 NOTE — DISCHARGE INSTRUCTIONS
Please get your labs rechecked to ensure that your hgb is going back up to normal. Start taking iron supplements.
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
## 2024-05-13 LAB
ATRIAL RATE: 75 BPM
P AXIS: 44 DEGREES
PR INTERVAL: 156 MS
QRS AXIS: 47 DEGREES
QRSD INTERVAL: 82 MS
QT INTERVAL: 386 MS
QTC INTERVAL: 431 MS
T WAVE AXIS: 45 DEGREES
VENTRICULAR RATE: 75 BPM

## 2024-05-13 PROCEDURE — 93010 ELECTROCARDIOGRAM REPORT: CPT | Performed by: INTERNAL MEDICINE

## 2024-05-13 PROCEDURE — 93971 EXTREMITY STUDY: CPT | Performed by: SURGERY

## 2024-06-25 ENCOUNTER — HOSPITAL ENCOUNTER (EMERGENCY)
Facility: HOSPITAL | Age: 49
Discharge: HOME | End: 2024-06-25
Attending: EMERGENCY MEDICINE | Admitting: EMERGENCY MEDICINE
Payer: COMMERCIAL

## 2024-06-25 VITALS
SYSTOLIC BLOOD PRESSURE: 115 MMHG | OXYGEN SATURATION: 100 % | HEART RATE: 90 BPM | TEMPERATURE: 98.3 F | RESPIRATION RATE: 18 BRPM | DIASTOLIC BLOOD PRESSURE: 71 MMHG

## 2024-06-25 DIAGNOSIS — L03.113 CELLULITIS OF RIGHT WRIST: ICD-10-CM

## 2024-06-25 DIAGNOSIS — G89.18 POSTOPERATIVE PAIN: Primary | ICD-10-CM

## 2024-06-25 PROCEDURE — 99284 EMERGENCY DEPT VISIT MOD MDM: CPT | Performed by: EMERGENCY MEDICINE

## 2024-06-25 PROCEDURE — 99282 EMERGENCY DEPT VISIT SF MDM: CPT

## 2024-06-25 RX ORDER — CEPHALEXIN 500 MG/1
500 CAPSULE ORAL EVERY 6 HOURS SCHEDULED
Qty: 28 CAPSULE | Refills: 0 | Status: SHIPPED | OUTPATIENT
Start: 2024-06-25 | End: 2024-07-02
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
## 2024-06-27 ENCOUNTER — HOSPITAL ENCOUNTER (OUTPATIENT)
Dept: RADIOLOGY | Facility: HOSPITAL | Age: 49
End: 2024-06-27
Payer: COMMERCIAL

## 2024-06-27 DIAGNOSIS — S52.531A CLOSED COLLES' FRACTURE OF RIGHT RADIUS, INITIAL ENCOUNTER: ICD-10-CM

## 2024-06-27 PROCEDURE — 73110 X-RAY EXAM OF WRIST: CPT

## 2024-07-28 ENCOUNTER — HOSPITAL ENCOUNTER (EMERGENCY)
Facility: HOSPITAL | Age: 49
Discharge: HOME | End: 2024-07-28
Attending: EMERGENCY MEDICINE | Admitting: EMERGENCY MEDICINE
Payer: COMMERCIAL

## 2024-07-28 ENCOUNTER — APPOINTMENT (EMERGENCY)
Dept: RADIOLOGY | Facility: HOSPITAL | Age: 49
End: 2024-07-28
Payer: COMMERCIAL

## 2024-07-28 VITALS
RESPIRATION RATE: 18 BRPM | HEART RATE: 87 BPM | DIASTOLIC BLOOD PRESSURE: 60 MMHG | SYSTOLIC BLOOD PRESSURE: 105 MMHG | OXYGEN SATURATION: 99 % | TEMPERATURE: 98.6 F

## 2024-07-28 DIAGNOSIS — M25.539 ACUTE WRIST PAIN: Primary | ICD-10-CM

## 2024-07-28 PROCEDURE — 99284 EMERGENCY DEPT VISIT MOD MDM: CPT | Performed by: EMERGENCY MEDICINE

## 2024-07-28 PROCEDURE — 99283 EMERGENCY DEPT VISIT LOW MDM: CPT

## 2024-07-28 PROCEDURE — 73110 X-RAY EXAM OF WRIST: CPT

## 2024-07-28 RX ORDER — DOXYCYCLINE HYCLATE 100 MG/1
100 CAPSULE ORAL 2 TIMES DAILY
Qty: 14 CAPSULE | Refills: 0 | Status: SHIPPED | OUTPATIENT
Start: 2024-07-28 | End: 2024-08-04
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
## 2024-07-28 NOTE — DISCHARGE INSTRUCTIONS
Use your Velcro splint for your right wrist that you have at home until you follow-up with your orthopedic doctor and take antibiotic as prescribed

## 2024-10-08 ENCOUNTER — APPOINTMENT (EMERGENCY)
Dept: RADIOLOGY | Facility: HOSPITAL | Age: 49
End: 2024-10-08
Payer: COMMERCIAL

## 2024-10-08 ENCOUNTER — HOSPITAL ENCOUNTER (EMERGENCY)
Facility: HOSPITAL | Age: 49
Discharge: HOME | End: 2024-10-08
Attending: EMERGENCY MEDICINE
Payer: COMMERCIAL

## 2024-10-08 VITALS
DIASTOLIC BLOOD PRESSURE: 76 MMHG | TEMPERATURE: 97.5 F | HEART RATE: 80 BPM | SYSTOLIC BLOOD PRESSURE: 121 MMHG | RESPIRATION RATE: 18 BRPM | OXYGEN SATURATION: 100 %

## 2024-10-08 DIAGNOSIS — M25.531 RIGHT WRIST PAIN: Primary | ICD-10-CM

## 2024-10-08 LAB
ALBUMIN SERPL BCG-MCNC: 4.1 G/DL (ref 3.5–5)
ALP SERPL-CCNC: 87 U/L (ref 34–104)
ALT SERPL W P-5'-P-CCNC: 9 U/L (ref 7–52)
ANION GAP SERPL CALCULATED.3IONS-SCNC: 5 MMOL/L (ref 4–13)
AST SERPL W P-5'-P-CCNC: 11 U/L (ref 13–39)
BASOPHILS # BLD AUTO: 0.09 THOUSANDS/ΜL (ref 0–0.1)
BASOPHILS NFR BLD AUTO: 1 % (ref 0–1)
BILIRUB SERPL-MCNC: 0.16 MG/DL (ref 0.2–1)
BUN SERPL-MCNC: 13 MG/DL (ref 5–25)
CALCIUM SERPL-MCNC: 8.8 MG/DL (ref 8.4–10.2)
CHLORIDE SERPL-SCNC: 108 MMOL/L (ref 96–108)
CO2 SERPL-SCNC: 24 MMOL/L (ref 21–32)
CREAT SERPL-MCNC: 0.69 MG/DL (ref 0.6–1.3)
CRP SERPL QL: <1 MG/L
EOSINOPHIL # BLD AUTO: 0.5 THOUSAND/ΜL (ref 0–0.61)
EOSINOPHIL NFR BLD AUTO: 7 % (ref 0–6)
ERYTHROCYTE [DISTWIDTH] IN BLOOD BY AUTOMATED COUNT: 19 % (ref 11.6–15.1)
ERYTHROCYTE [SEDIMENTATION RATE] IN BLOOD: 13 MM/HOUR (ref 0–19)
GFR SERPL CREATININE-BSD FRML MDRD: 103 ML/MIN/1.73SQ M
GLUCOSE SERPL-MCNC: 82 MG/DL (ref 65–140)
HCT VFR BLD AUTO: 27.7 % (ref 34.8–46.1)
HGB BLD-MCNC: 7.9 G/DL (ref 11.5–15.4)
IMM GRANULOCYTES # BLD AUTO: 0.02 THOUSAND/UL (ref 0–0.2)
IMM GRANULOCYTES NFR BLD AUTO: 0 % (ref 0–2)
LACTATE SERPL-SCNC: 0.8 MMOL/L (ref 0.5–2)
LYMPHOCYTES # BLD AUTO: 3.51 THOUSANDS/ΜL (ref 0.6–4.47)
LYMPHOCYTES NFR BLD AUTO: 48 % (ref 14–44)
MCH RBC QN AUTO: 20.1 PG (ref 26.8–34.3)
MCHC RBC AUTO-ENTMCNC: 28.5 G/DL (ref 31.4–37.4)
MCV RBC AUTO: 70 FL (ref 82–98)
MONOCYTES # BLD AUTO: 0.56 THOUSAND/ΜL (ref 0.17–1.22)
MONOCYTES NFR BLD AUTO: 8 % (ref 4–12)
NEUTROPHILS # BLD AUTO: 2.63 THOUSANDS/ΜL (ref 1.85–7.62)
NEUTS SEG NFR BLD AUTO: 36 % (ref 43–75)
NRBC BLD AUTO-RTO: 0 /100 WBCS
PLATELET # BLD AUTO: 311 THOUSANDS/UL (ref 149–390)
PMV BLD AUTO: 8.9 FL (ref 8.9–12.7)
POTASSIUM SERPL-SCNC: 3.8 MMOL/L (ref 3.5–5.3)
PROCALCITONIN SERPL-MCNC: <0.05 NG/ML
PROT SERPL-MCNC: 6.9 G/DL (ref 6.4–8.4)
RBC # BLD AUTO: 3.94 MILLION/UL (ref 3.81–5.12)
SODIUM SERPL-SCNC: 137 MMOL/L (ref 135–147)
WBC # BLD AUTO: 7.31 THOUSAND/UL (ref 4.31–10.16)

## 2024-10-08 PROCEDURE — 85025 COMPLETE CBC W/AUTO DIFF WBC: CPT | Performed by: EMERGENCY MEDICINE

## 2024-10-08 PROCEDURE — 36415 COLL VENOUS BLD VENIPUNCTURE: CPT | Performed by: EMERGENCY MEDICINE

## 2024-10-08 PROCEDURE — 99285 EMERGENCY DEPT VISIT HI MDM: CPT | Performed by: EMERGENCY MEDICINE

## 2024-10-08 PROCEDURE — 80053 COMPREHEN METABOLIC PANEL: CPT | Performed by: EMERGENCY MEDICINE

## 2024-10-08 PROCEDURE — 83605 ASSAY OF LACTIC ACID: CPT | Performed by: EMERGENCY MEDICINE

## 2024-10-08 PROCEDURE — 86140 C-REACTIVE PROTEIN: CPT | Performed by: EMERGENCY MEDICINE

## 2024-10-08 PROCEDURE — 84145 PROCALCITONIN (PCT): CPT | Performed by: EMERGENCY MEDICINE

## 2024-10-08 PROCEDURE — 85652 RBC SED RATE AUTOMATED: CPT | Performed by: EMERGENCY MEDICINE

## 2024-10-08 PROCEDURE — 73110 X-RAY EXAM OF WRIST: CPT

## 2024-10-08 PROCEDURE — 99283 EMERGENCY DEPT VISIT LOW MDM: CPT

## 2024-10-08 PROCEDURE — 96374 THER/PROPH/DIAG INJ IV PUSH: CPT

## 2024-10-08 RX ORDER — MORPHINE SULFATE 4 MG/ML
4 INJECTION, SOLUTION INTRAMUSCULAR; INTRAVENOUS ONCE
Status: COMPLETED | OUTPATIENT
Start: 2024-10-08 | End: 2024-10-08

## 2024-10-08 RX ADMIN — MORPHINE SULFATE 4 MG: 4 INJECTION, SOLUTION INTRAMUSCULAR; INTRAVENOUS at 20:42

## 2024-10-08 RX ADMIN — DICLOFENAC SODIUM 2 G: 10 GEL TOPICAL at 21:54

## 2024-10-08 NOTE — Clinical Note
Tamar Farrell was seen and treated in our emergency department on 10/8/2024.            limited use of right hand until cleared by orthopedics    Diagnosis:     Tamar  .    She may return on this date: 10/10/2024         If you have any questions or concerns, please don't hesitate to call.      Elyse Shaw MD    ______________________________           _______________          _______________  Hospital Representative                              Date                                Time
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
## 2025-04-13 ENCOUNTER — APPOINTMENT (EMERGENCY)
Dept: RADIOLOGY | Facility: HOSPITAL | Age: 50
DRG: 720 | End: 2025-04-13
Payer: COMMERCIAL

## 2025-04-13 ENCOUNTER — APPOINTMENT (EMERGENCY)
Dept: CT IMAGING | Facility: HOSPITAL | Age: 50
DRG: 720 | End: 2025-04-13
Payer: COMMERCIAL

## 2025-04-13 ENCOUNTER — HOSPITAL ENCOUNTER (INPATIENT)
Facility: HOSPITAL | Age: 50
LOS: 1 days | Discharge: HOME | DRG: 720 | End: 2025-04-14
Attending: EMERGENCY MEDICINE | Admitting: INTERNAL MEDICINE
Payer: COMMERCIAL

## 2025-04-13 DIAGNOSIS — D64.9 ANEMIA: ICD-10-CM

## 2025-04-13 DIAGNOSIS — J18.9 MULTIFOCAL PNEUMONIA: Primary | ICD-10-CM

## 2025-04-13 PROBLEM — A41.9 SEPSIS (HCC): Status: ACTIVE | Noted: 2025-04-13

## 2025-04-13 PROBLEM — Z87.81 HISTORY OF CLOSED COLLES' FRACTURE: Status: ACTIVE | Noted: 2025-04-13

## 2025-04-13 PROBLEM — F32.A DEPRESSION: Status: ACTIVE | Noted: 2025-04-13

## 2025-04-13 PROBLEM — R06.02 SOB (SHORTNESS OF BREATH): Status: ACTIVE | Noted: 2025-04-13

## 2025-04-13 PROBLEM — R60.0 BILATERAL LOWER EXTREMITY EDEMA: Status: ACTIVE | Noted: 2025-04-13

## 2025-04-13 LAB
ABO GROUP BLD: NORMAL
ALBUMIN SERPL BCG-MCNC: 3.3 G/DL (ref 3.5–5)
ALP SERPL-CCNC: 80 U/L (ref 34–104)
ALT SERPL W P-5'-P-CCNC: 7 U/L (ref 7–52)
ANION GAP SERPL CALCULATED.3IONS-SCNC: 6 MMOL/L (ref 4–13)
APTT PPP: 40 SECONDS (ref 23–34)
AST SERPL W P-5'-P-CCNC: 7 U/L (ref 13–39)
ATRIAL RATE: 87 BPM
BASOPHILS # BLD AUTO: 0.03 THOUSANDS/ÂΜL (ref 0–0.1)
BASOPHILS NFR BLD AUTO: 0 % (ref 0–1)
BILIRUB SERPL-MCNC: 0.23 MG/DL (ref 0.2–1)
BILIRUB UR QL STRIP: NEGATIVE
BLD GP AB SCN SERPL QL: NEGATIVE
BNP SERPL-MCNC: 207 PG/ML (ref 0–100)
BUN SERPL-MCNC: 6 MG/DL (ref 5–25)
CALCIUM ALBUM COR SERPL-MCNC: 8.8 MG/DL (ref 8.3–10.1)
CALCIUM SERPL-MCNC: 8.2 MG/DL (ref 8.4–10.2)
CARDIAC TROPONIN I PNL SERPL HS: <2 NG/L (ref ?–50)
CHLORIDE SERPL-SCNC: 113 MMOL/L (ref 96–108)
CLARITY UR: CLEAR
CO2 SERPL-SCNC: 21 MMOL/L (ref 21–32)
COLOR UR: ABNORMAL
CREAT SERPL-MCNC: 0.51 MG/DL (ref 0.6–1.3)
CRP SERPL QL: 86.6 MG/L
EOSINOPHIL # BLD AUTO: 0.24 THOUSAND/ÂΜL (ref 0–0.61)
EOSINOPHIL NFR BLD AUTO: 2 % (ref 0–6)
ERYTHROCYTE [DISTWIDTH] IN BLOOD BY AUTOMATED COUNT: 19 % (ref 11.6–15.1)
FERRITIN SERPL-MCNC: 11 NG/ML (ref 30–307)
FLUAV RNA RESP QL NAA+PROBE: NEGATIVE
FLUBV RNA RESP QL NAA+PROBE: NEGATIVE
GFR SERPL CREATININE-BSD FRML MDRD: 114 ML/MIN/1.73SQ M
GLUCOSE SERPL-MCNC: 99 MG/DL (ref 65–140)
GLUCOSE UR STRIP-MCNC: NEGATIVE MG/DL
HCT VFR BLD AUTO: 24.4 % (ref 34.8–46.1)
HGB BLD-MCNC: 6.7 G/DL (ref 11.5–15.4)
HGB UR QL STRIP.AUTO: NEGATIVE
IMM GRANULOCYTES # BLD AUTO: 0.16 THOUSAND/UL (ref 0–0.2)
IMM GRANULOCYTES NFR BLD AUTO: 1 % (ref 0–2)
INR PPP: 1.21 (ref 0.85–1.19)
IRON SATN MFR SERPL: 2 % (ref 15–50)
IRON SERPL-MCNC: 11 UG/DL (ref 50–212)
KETONES UR STRIP-MCNC: NEGATIVE MG/DL
LACTATE SERPL-SCNC: 1.2 MMOL/L (ref 0.5–2)
LEUKOCYTE ESTERASE UR QL STRIP: NEGATIVE
LYMPHOCYTES # BLD AUTO: 2.17 THOUSANDS/ÂΜL (ref 0.6–4.47)
LYMPHOCYTES NFR BLD AUTO: 14 % (ref 14–44)
MCH RBC QN AUTO: 18.4 PG (ref 26.8–34.3)
MCHC RBC AUTO-ENTMCNC: 27.5 G/DL (ref 31.4–37.4)
MCV RBC AUTO: 67 FL (ref 82–98)
MONOCYTES # BLD AUTO: 0.73 THOUSAND/ÂΜL (ref 0.17–1.22)
MONOCYTES NFR BLD AUTO: 5 % (ref 4–12)
NEUTROPHILS # BLD AUTO: 12.8 THOUSANDS/ÂΜL (ref 1.85–7.62)
NEUTS SEG NFR BLD AUTO: 78 % (ref 43–75)
NITRITE UR QL STRIP: NEGATIVE
NRBC BLD AUTO-RTO: 0 /100 WBCS
P AXIS: 37 DEGREES
PH UR STRIP.AUTO: 8 [PH]
PLATELET # BLD AUTO: 198 THOUSANDS/UL (ref 149–390)
PMV BLD AUTO: 8.8 FL (ref 8.9–12.7)
POTASSIUM SERPL-SCNC: 3.8 MMOL/L (ref 3.5–5.3)
PR INTERVAL: 144 MS
PROCALCITONIN SERPL-MCNC: 0.43 NG/ML
PROT SERPL-MCNC: 6 G/DL (ref 6.4–8.4)
PROT UR STRIP-MCNC: NEGATIVE MG/DL
PROTHROMBIN TIME: 15.8 SECONDS (ref 12.3–15)
QRS AXIS: 45 DEGREES
QRSD INTERVAL: 86 MS
QT INTERVAL: 370 MS
QTC INTERVAL: 445 MS
RBC # BLD AUTO: 3.65 MILLION/UL (ref 3.81–5.12)
RETICS # AUTO: NORMAL 10*3/UL (ref 14097–95744)
RETICS # CALC: 1.25 % (ref 0.37–1.87)
RH BLD: NEGATIVE
RSV RNA RESP QL NAA+PROBE: NEGATIVE
SARS-COV-2 RNA RESP QL NAA+PROBE: NEGATIVE
SODIUM SERPL-SCNC: 140 MMOL/L (ref 135–147)
SP GR UR STRIP.AUTO: <1.005 (ref 1–1.03)
SPECIMEN EXPIRATION DATE: NORMAL
T WAVE AXIS: 20 DEGREES
TIBC SERPL-MCNC: 452.2 UG/DL (ref 250–450)
TRANSFERRIN SERPL-MCNC: 323 MG/DL (ref 203–362)
UIBC SERPL-MCNC: 441 UG/DL (ref 155–355)
UROBILINOGEN UR STRIP-ACNC: <2 MG/DL
VENTRICULAR RATE: 87 BPM
WBC # BLD AUTO: 16.13 THOUSAND/UL (ref 4.31–10.16)

## 2025-04-13 PROCEDURE — 93010 ELECTROCARDIOGRAM REPORT: CPT | Performed by: INTERNAL MEDICINE

## 2025-04-13 PROCEDURE — 84145 PROCALCITONIN (PCT): CPT | Performed by: EMERGENCY MEDICINE

## 2025-04-13 PROCEDURE — 83550 IRON BINDING TEST: CPT

## 2025-04-13 PROCEDURE — 86901 BLOOD TYPING SEROLOGIC RH(D): CPT

## 2025-04-13 PROCEDURE — 36415 COLL VENOUS BLD VENIPUNCTURE: CPT | Performed by: EMERGENCY MEDICINE

## 2025-04-13 PROCEDURE — 96365 THER/PROPH/DIAG IV INF INIT: CPT

## 2025-04-13 PROCEDURE — 80053 COMPREHEN METABOLIC PANEL: CPT | Performed by: EMERGENCY MEDICINE

## 2025-04-13 PROCEDURE — 0241U HB NFCT DS VIR RESP RNA 4 TRGT: CPT | Performed by: EMERGENCY MEDICINE

## 2025-04-13 PROCEDURE — 83880 ASSAY OF NATRIURETIC PEPTIDE: CPT | Performed by: EMERGENCY MEDICINE

## 2025-04-13 PROCEDURE — 82728 ASSAY OF FERRITIN: CPT

## 2025-04-13 PROCEDURE — 84484 ASSAY OF TROPONIN QUANT: CPT | Performed by: EMERGENCY MEDICINE

## 2025-04-13 PROCEDURE — 99223 1ST HOSP IP/OBS HIGH 75: CPT

## 2025-04-13 PROCEDURE — 86920 COMPATIBILITY TEST SPIN: CPT

## 2025-04-13 PROCEDURE — 87449 NOS EACH ORGANISM AG IA: CPT

## 2025-04-13 PROCEDURE — 99285 EMERGENCY DEPT VISIT HI MDM: CPT

## 2025-04-13 PROCEDURE — P9016 RBC LEUKOCYTES REDUCED: HCPCS

## 2025-04-13 PROCEDURE — 71045 X-RAY EXAM CHEST 1 VIEW: CPT

## 2025-04-13 PROCEDURE — 85610 PROTHROMBIN TIME: CPT | Performed by: EMERGENCY MEDICINE

## 2025-04-13 PROCEDURE — 81003 URINALYSIS AUTO W/O SCOPE: CPT | Performed by: EMERGENCY MEDICINE

## 2025-04-13 PROCEDURE — 99291 CRITICAL CARE FIRST HOUR: CPT | Performed by: EMERGENCY MEDICINE

## 2025-04-13 PROCEDURE — 86140 C-REACTIVE PROTEIN: CPT | Performed by: EMERGENCY MEDICINE

## 2025-04-13 PROCEDURE — 30233N1 TRANSFUSION OF NONAUTOLOGOUS RED BLOOD CELLS INTO PERIPHERAL VEIN, PERCUTANEOUS APPROACH: ICD-10-PCS | Performed by: EMERGENCY MEDICINE

## 2025-04-13 PROCEDURE — 87040 BLOOD CULTURE FOR BACTERIA: CPT | Performed by: EMERGENCY MEDICINE

## 2025-04-13 PROCEDURE — 85025 COMPLETE CBC W/AUTO DIFF WBC: CPT | Performed by: EMERGENCY MEDICINE

## 2025-04-13 PROCEDURE — 86900 BLOOD TYPING SEROLOGIC ABO: CPT

## 2025-04-13 PROCEDURE — 86850 RBC ANTIBODY SCREEN: CPT

## 2025-04-13 PROCEDURE — 83605 ASSAY OF LACTIC ACID: CPT | Performed by: EMERGENCY MEDICINE

## 2025-04-13 PROCEDURE — 71275 CT ANGIOGRAPHY CHEST: CPT

## 2025-04-13 PROCEDURE — 85730 THROMBOPLASTIN TIME PARTIAL: CPT | Performed by: EMERGENCY MEDICINE

## 2025-04-13 PROCEDURE — 83540 ASSAY OF IRON: CPT

## 2025-04-13 PROCEDURE — 85045 AUTOMATED RETICULOCYTE COUNT: CPT

## 2025-04-13 PROCEDURE — 93005 ELECTROCARDIOGRAM TRACING: CPT

## 2025-04-13 RX ORDER — SODIUM CHLORIDE, SODIUM GLUCONATE, SODIUM ACETATE, POTASSIUM CHLORIDE, MAGNESIUM CHLORIDE, SODIUM PHOSPHATE, DIBASIC, AND POTASSIUM PHOSPHATE .53; .5; .37; .037; .03; .012; .00082 G/100ML; G/100ML; G/100ML; G/100ML; G/100ML; G/100ML; G/100ML
125 INJECTION, SOLUTION INTRAVENOUS CONTINUOUS
Status: DISPENSED | OUTPATIENT
Start: 2025-04-13 | End: 2025-04-13

## 2025-04-13 RX ORDER — ROPINIROLE 2 MG/1
8 TABLET, FILM COATED ORAL
Status: DISCONTINUED | OUTPATIENT
Start: 2025-04-13 | End: 2025-04-14 | Stop reason: HOSPADM

## 2025-04-13 RX ORDER — LEVOFLOXACIN 5 MG/ML
750 INJECTION, SOLUTION INTRAVENOUS EVERY 24 HOURS
Status: DISCONTINUED | OUTPATIENT
Start: 2025-04-14 | End: 2025-04-14

## 2025-04-13 RX ORDER — BUSPIRONE HYDROCHLORIDE 15 MG/1
15 TABLET ORAL 3 TIMES DAILY
Status: DISCONTINUED | OUTPATIENT
Start: 2025-04-13 | End: 2025-04-14 | Stop reason: HOSPADM

## 2025-04-13 RX ORDER — SUMATRIPTAN 50 MG/1
25 TABLET, FILM COATED ORAL ONCE AS NEEDED
Status: DISCONTINUED | OUTPATIENT
Start: 2025-04-13 | End: 2025-04-14 | Stop reason: HOSPADM

## 2025-04-13 RX ORDER — MORPHINE SULFATE 15 MG/1
15 TABLET, FILM COATED, EXTENDED RELEASE ORAL 2 TIMES DAILY
Refills: 0 | Status: DISCONTINUED | OUTPATIENT
Start: 2025-04-13 | End: 2025-04-14 | Stop reason: HOSPADM

## 2025-04-13 RX ORDER — GABAPENTIN 400 MG/1
800 CAPSULE ORAL 4 TIMES DAILY
Status: DISCONTINUED | OUTPATIENT
Start: 2025-04-13 | End: 2025-04-14 | Stop reason: HOSPADM

## 2025-04-13 RX ORDER — OXYCODONE AND ACETAMINOPHEN 5; 325 MG/1; MG/1
2 TABLET ORAL EVERY 4 HOURS PRN
COMMUNITY
Start: 2025-04-01

## 2025-04-13 RX ORDER — PROPRANOLOL HYDROCHLORIDE 60 MG/1
60 CAPSULE, EXTENDED RELEASE ORAL DAILY
Status: DISCONTINUED | OUTPATIENT
Start: 2025-04-13 | End: 2025-04-14 | Stop reason: HOSPADM

## 2025-04-13 RX ORDER — BACLOFEN 10 MG/1
20 TABLET ORAL 2 TIMES DAILY
Status: DISCONTINUED | OUTPATIENT
Start: 2025-04-13 | End: 2025-04-14 | Stop reason: HOSPADM

## 2025-04-13 RX ORDER — OXYCODONE AND ACETAMINOPHEN 5; 325 MG/1; MG/1
1 TABLET ORAL EVERY 4 HOURS PRN
Refills: 0 | Status: DISCONTINUED | OUTPATIENT
Start: 2025-04-13 | End: 2025-04-13

## 2025-04-13 RX ORDER — VENLAFAXINE HYDROCHLORIDE 150 MG/1
150 CAPSULE, EXTENDED RELEASE ORAL 2 TIMES DAILY WITH MEALS
Status: DISCONTINUED | OUTPATIENT
Start: 2025-04-13 | End: 2025-04-14 | Stop reason: HOSPADM

## 2025-04-13 RX ORDER — OXYCODONE AND ACETAMINOPHEN 5; 325 MG/1; MG/1
2 TABLET ORAL EVERY 4 HOURS PRN
Refills: 0 | Status: DISCONTINUED | OUTPATIENT
Start: 2025-04-13 | End: 2025-04-14 | Stop reason: HOSPADM

## 2025-04-13 RX ORDER — ACETAMINOPHEN 325 MG/1
650 TABLET ORAL EVERY 6 HOURS PRN
Status: DISCONTINUED | OUTPATIENT
Start: 2025-04-13 | End: 2025-04-14 | Stop reason: HOSPADM

## 2025-04-13 RX ORDER — HYDROXYZINE HYDROCHLORIDE 25 MG/1
25 TABLET, FILM COATED ORAL EVERY 6 HOURS PRN
Status: DISCONTINUED | OUTPATIENT
Start: 2025-04-13 | End: 2025-04-14 | Stop reason: HOSPADM

## 2025-04-13 RX ORDER — MORPHINE SULFATE 15 MG/1
1 TABLET, FILM COATED, EXTENDED RELEASE ORAL 2 TIMES DAILY
COMMUNITY
Start: 2025-03-26

## 2025-04-13 RX ORDER — LEVOFLOXACIN 5 MG/ML
750 INJECTION, SOLUTION INTRAVENOUS ONCE
Status: DISCONTINUED | OUTPATIENT
Start: 2025-04-13 | End: 2025-04-13

## 2025-04-13 RX ORDER — NICOTINE 21 MG/24HR
1 PATCH, TRANSDERMAL 24 HOURS TRANSDERMAL DAILY
Status: DISCONTINUED | OUTPATIENT
Start: 2025-04-13 | End: 2025-04-14 | Stop reason: HOSPADM

## 2025-04-13 RX ADMIN — GABAPENTIN 800 MG: 400 CAPSULE ORAL at 17:39

## 2025-04-13 RX ADMIN — GABAPENTIN 800 MG: 400 CAPSULE ORAL at 21:40

## 2025-04-13 RX ADMIN — BACLOFEN 20 MG: 10 TABLET ORAL at 17:39

## 2025-04-13 RX ADMIN — ROPINIROLE HYDROCHLORIDE 8 MG: 2 TABLET, FILM COATED ORAL at 21:40

## 2025-04-13 RX ADMIN — SODIUM CHLORIDE 1000 ML: 0.9 INJECTION, SOLUTION INTRAVENOUS at 11:05

## 2025-04-13 RX ADMIN — LEVOFLOXACIN 750 MG: 750 INJECTION, SOLUTION INTRAVENOUS at 11:08

## 2025-04-13 RX ADMIN — MORPHINE SULFATE 15 MG: 15 TABLET, EXTENDED RELEASE ORAL at 21:40

## 2025-04-13 RX ADMIN — SODIUM CHLORIDE, SODIUM GLUCONATE, SODIUM ACETATE, POTASSIUM CHLORIDE, MAGNESIUM CHLORIDE, SODIUM PHOSPHATE, DIBASIC, AND POTASSIUM PHOSPHATE 125 ML/HR: .53; .5; .37; .037; .03; .012; .00082 INJECTION, SOLUTION INTRAVENOUS at 21:57

## 2025-04-13 RX ADMIN — OXYCODONE HYDROCHLORIDE AND ACETAMINOPHEN 2 TABLET: 5; 325 TABLET ORAL at 20:03

## 2025-04-13 RX ADMIN — BUSPIRONE HYDROCHLORIDE 15 MG: 15 TABLET ORAL at 20:03

## 2025-04-13 RX ADMIN — VENLAFAXINE HYDROCHLORIDE 150 MG: 150 CAPSULE, EXTENDED RELEASE ORAL at 16:02

## 2025-04-13 RX ADMIN — MORPHINE SULFATE 15 MG: 15 TABLET, EXTENDED RELEASE ORAL at 13:22

## 2025-04-13 RX ADMIN — APIXABAN 5 MG: 5 TABLET, FILM COATED ORAL at 17:39

## 2025-04-13 RX ADMIN — OXYCODONE HYDROCHLORIDE AND ACETAMINOPHEN 1 TABLET: 5; 325 TABLET ORAL at 16:02

## 2025-04-13 RX ADMIN — BUSPIRONE HYDROCHLORIDE 15 MG: 15 TABLET ORAL at 16:02

## 2025-04-13 RX ADMIN — NICOTINE 1 PATCH: 14 PATCH, EXTENDED RELEASE TRANSDERMAL at 13:22

## 2025-04-13 RX ADMIN — SODIUM CHLORIDE, SODIUM GLUCONATE, SODIUM ACETATE, POTASSIUM CHLORIDE, MAGNESIUM CHLORIDE, SODIUM PHOSPHATE, DIBASIC, AND POTASSIUM PHOSPHATE 125 ML/HR: .53; .5; .37; .037; .03; .012; .00082 INJECTION, SOLUTION INTRAVENOUS at 13:27

## 2025-04-13 RX ADMIN — IOHEXOL 85 ML: 350 INJECTION, SOLUTION INTRAVENOUS at 08:31

## 2025-04-13 NOTE — SEPSIS NOTE
"  Sepsis Note   Tamar Farrell 49 y.o. female MRN: [id number]  Unit/Bed#: ED 12 Encounter: [id number]       Initial Sepsis Screening       Row Name 04/13/25 1043                Is the patient's history suggestive of a new or worsening infection? Yes (Proceed)  -SYL        Suspected source of infection urinary tract infection  -SYL        Indicate SIRS criteria Tachycardia > 90 bpm;Leukocytosis (WBC > 40403 IJL) OR Leukopenia (WBC <4000 IJL) OR Bandemia (WBC >10% bands)  -SYL        Are two or more of the above signs & symptoms of infection both present and new to the patient? Yes (Proceed)  -SYL        Assess for evidence of organ dysfunction: Are any of the below criteria present within 6 hours of suspected infection and SIRS criteria that are NOT considered to be chronic conditions? Lactate > 2.0  -SYL        Date of presentation of severe sepsis 04/13/25  -SYL        Time of presentation of severe sepsis 1044  -SYL        Sepsis Note: Click \"NEXT\" below (NOT \"close\") to generate sepsis note based on above information. YES (proceed by clicking \"NEXT\")  -SYL                  User Key  (r) = Recorded By, (t) = Taken By, (c) = Cosigned By      Initials Name Provider Type    SYL Kamran Romo DO Physician                        Body mass index is 29.95 kg/m².  Wt Readings from Last 1 Encounters:   04/13/25 81.6 kg (180 lb)     IBW (Ideal Body Weight): 57 kg    Ideal body weight: 57 kg (125 lb 10.6 oz)  Adjusted ideal body weight: 66.9 kg (147 lb 6.4 oz)    "

## 2025-04-13 NOTE — ASSESSMENT & PLAN NOTE
Patient with past medical of anxiety  Patient is on BuSpar, venlafaxine and propranolol at home  Continue home regimen

## 2025-04-13 NOTE — PLAN OF CARE
Problem: Potential for Falls  Goal: Patient will remain free of falls  Description: INTERVENTIONS:- Educate patient/family on patient safety including physical limitations- Instruct patient to call for assistance with activity - Consult OT/PT to assist with strengthening/mobility - Keep Call bell within reach- Keep bed low and locked with side rails adjusted as appropriate- Keep care items and personal belongings within reach- Initiate and maintain comfort rounds- Make Fall Risk Sign visible to staff- Offer Toileting every xc Hours, in advance of need- Initiate/Maintain xxalarm- Obtain necessary fall risk management equipment: x- Apply yellow socks and bracelet for high fall risk patients- Consider moving patient to room near nurses station  INTERVENTIONS:- Educate patient/family on patient safety including physical limitations- Instruct patient to call for assistance with activity - Consult OT/PT to assist with strengthening/mobility - Keep Call bell within reach- Keep bed low and locked with side rails adjusted as appropriate- Keep care items and personal belongings within reach- Initiate and maintain comfort rounds- Make Fall Risk Sign visible to staff- Offer Toileting every x Hours, in advance of need- Initiate/Maintain xalarm- Obtain necessary fall risk management equipment: x- Apply yellow socks and bracelet for high fall risk patients- Consider moving patient to room near nurses station  Outcome: Progressing     Problem: PAIN - ADULT  Goal: Verbalizes/displays adequate comfort level or baseline comfort level  Description: Interventions:- Encourage patient to monitor pain and request assistance- Assess pain using appropriate pain scale- Administer analgesics based on type and severity of pain and evaluate response- Implement non-pharmacological measures as appropriate and evaluate response- Consider cultural and social influences on pain and pain management- Notify physician/advanced practitioner if  interventions unsuccessful or patient reports new pain  Outcome: Progressing     Problem: INFECTION - ADULT  Goal: Absence or prevention of progression during hospitalization  Description: INTERVENTIONS:- Assess and monitor for signs and symptoms of infection- Monitor lab/diagnostic results- Monitor all insertion sites, i.e. indwelling lines, tubes, and drains- Monitor endotracheal if appropriate and nasal secretions for changes in amount and color- [place] appropriate cooling/warming therapies per order- Administer medications as ordered- Instruct and encourage patient and family to use good hand hygiene technique- Identify and instruct in appropriate isolation precautions for identified infection/condition  Outcome: Progressing  Goal: Absence of fever/infection during neutropenic period  Description: INTERVENTIONS:- Monitor WBC  Outcome: Progressing     Problem: SAFETY ADULT  Goal: Patient will remain free of falls  Description: INTERVENTIONS:- Educate patient/family on patient safety including physical limitations- Instruct patient to call for assistance with activity - Consult OT/PT to assist with strengthening/mobility - Keep Call bell within reach- Keep bed low and locked with side rails adjusted as appropriate- Keep care items and personal belongings within reach- Initiate and maintain comfort rounds- Make Fall Risk Sign visible to staff- Offer Toileting every x Hours, in advance of need- Initiate/Maintain xxalarm- Obtain necessary fall risk management equipment: x- Apply yellow socks and bracelet for high fall risk patients- Consider moving patient to room near nurses station  INTERVENTIONS:- Educate patient/family on patient safety including physical limitations- Instruct patient to call for assistance with activity - Consult OT/PT to assist with strengthening/mobility - Keep Call bell within reach- Keep bed low and locked with side rails adjusted as appropriate- Keep care items and personal belongings  within reach- Initiate and maintain comfort rounds- Make Fall Risk Sign visible to staff- Offer Toileting every x Hours, in advance of need- Initiate/Maintain xalarm- Obtain necessary fall risk management equipment: x- Apply yellow socks and bracelet for high fall risk patients- Consider moving patient to room near nurses station  Outcome: Progressing  Goal: Maintain or return to baseline ADL function  Description: INTERVENTIONS:-  Assess patient's ability to carry out ADLs; assess patient's baseline for ADL function and identify physical deficits which impact ability to perform ADLs (bathing, care of mouth/teeth, toileting, grooming, dressing, etc.)- Assess/evaluate cause of self-care deficits - Assess range of motion- Assess patient's mobility; develop plan if impaired- Assess patient's need for assistive devices and provide as appropriate- Encourage maximum independence but intervene and supervise when necessary- Involve family in performance of ADLs- Assess for home care needs following discharge - Consider OT consult to assist with ADL evaluation and planning for discharge- Provide patient education as appropriate  Outcome: Progressing  Goal: Maintains/Returns to pre admission functional level  Description: INTERVENTIONS:- Perform AM-PAC 6 Click Basic Mobility/ Daily Activity assessment daily.- Set and communicate daily mobility goal to care team and patient/family/caregiver. - Collaborate with rehabilitation services on mobility goals if consulted- Perform Range of Motion x times a day.- Reposition patient every x hours.- Dangle patient x times a day- Stand patient xx times a day- Ambulate patient x times a day- Out of bed to chair xx times a day - Out of bed for meals xxx times a day- Out of bed for toileting- Record patient progress and toleration of activity level   Outcome: Progressing     Problem: DISCHARGE PLANNING  Goal: Discharge to home or other facility with appropriate resources  Description:  INTERVENTIONS:- Identify barriers to discharge w/patient and caregiver- Arrange for needed discharge resources and transportation as appropriate- Identify discharge learning needs (meds, wound care, etc.)- Arrange for interpretive services to assist at discharge as needed- Refer to Case Management Department for coordinating discharge planning if the patient needs post-hospital services based on physician/advanced practitioner order or complex needs related to functional status, cognitive ability, or social support system  Outcome: Progressing     Problem: Knowledge Deficit  Goal: Patient/family/caregiver demonstrates understanding of disease process, treatment plan, medications, and discharge instructions  Description: Complete learning assessment and assess knowledge base.Interventions:- Provide teaching at level of understanding- Provide teaching via preferred learning methods  Outcome: Progressing
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
## 2025-04-13 NOTE — ASSESSMENT & PLAN NOTE
Patient presents with shortness of breath for several weeks  CT PE study showed no PE but also showed Peribronchovascular groundglass and nodular opacities throughout both lungs, likely infectious/inflammatory pneumonia.  Patient reported dry cough and chills  Leukocytic count is 16, lactic acid is normal  Pro-Ray 0.43  Patient was given IV Levaquin in the ED  Plan  Continue Levaquin as patient is allergic to cephalosporin  Continue IV fluid for 10 hours  Follow-up on MRSA culture, strep pneumoniae, Legionella  Follow-up on blood cultures  Trend fever  Trend white cell count

## 2025-04-13 NOTE — ED PROVIDER NOTES
Time reflects when diagnosis was documented in both MDM as applicable and the Disposition within this note       Time User Action Codes Description Comment    4/13/2025 10:31 AM Kamran Romo [J18.9] Multifocal pneumonia     4/13/2025 10:31 AM Kamran Romo [D64.9] Anemia           ED Disposition       ED Disposition   Admit    Condition   Stable    Date/Time   Sun Apr 13, 2025 10:31 AM    Comment   Case was discussed with slim   and the patient's admission status was agreed to be Admission Status: inpatient status to the service of Dr. Doyle .               Assessment & Plan       Medical Decision Making  Shortness of breath differential includes pulmonary embolism or pneumonia severe anemia workup in progress lab work and CAT scan    Amount and/or Complexity of Data Reviewed  Labs: ordered.  Radiology: ordered.    Risk  Prescription drug management.  Decision regarding hospitalization.        ED Course as of 04/13/25 1552   Sun Apr 13, 2025   1120 Medicine requests blood transfusion will initiate   1120 Critical Care Time Statement: Upon my evaluation, this patient had a high probability of imminent or life-threatening deterioration due to severe anemia requiring blood transfusion, which required my direct attention, intervention, and personal management.  I spent a total of 30 minutes directly providing critical care services, including interpretation of complex medical databases, evaluating for the presence of life-threatening injuries or illnesses, management of organ system failure(s) , complex medical decision making (to support/prevent further life-threatening deterioration)., and interpretation of hemodynamic data. This time is exclusive of procedures, teaching, treating other patients, family meetings, and any prior time recorded by providers other than myself.           Medications   iohexol (OMNIPAQUE) 350 MG/ML injection (MULTI-DOSE) 100 mL (85 mL Intravenous Given 4/13/25 0831)   sodium  chloride 0.9 % bolus 1,000 mL (1,000 mL Intravenous New Bag 4/13/25 1105)       ED Risk Strat Scores   HEART Risk Score      Flowsheet Row Most Recent Value   Heart Score Risk Calculator    History 0 Filed at: 04/13/2025 0937   ECG 0 Filed at: 04/13/2025 0937   Age 1 Filed at: 04/13/2025 0937   Risk Factors 0 Filed at: 04/13/2025 0937   Troponin 0 Filed at: 04/13/2025 0937   HEART Score 1 Filed at: 04/13/2025 0937          HEART Risk Score      Flowsheet Row Most Recent Value   Heart Score Risk Calculator    History 0 Filed at: 04/13/2025 0937   ECG 0 Filed at: 04/13/2025 0937   Age 1 Filed at: 04/13/2025 0937   Risk Factors 0 Filed at: 04/13/2025 0937   Troponin 0 Filed at: 04/13/2025 0937   HEART Score 1 Filed at: 04/13/2025 0937                      No data recorded        SBIRT 22yo+      Flowsheet Row Most Recent Value   Initial Alcohol Screen: US AUDIT-C     1. How often do you have a drink containing alcohol? 0 Filed at: 04/13/2025 0650   2. How many drinks containing alcohol do you have on a typical day you are drinking?  0 Filed at: 04/13/2025 0650   3b. FEMALE Any Age, or MALE 65+: How often do you have 4 or more drinks on one occassion? 0 Filed at: 04/13/2025 0650   Audit-C Score 0 Filed at: 04/13/2025 0650   DAVID: How many times in the past year have you...    Used an illegal drug or used a prescription medication for non-medical reasons? Never Filed at: 04/13/2025 0650                            History of Present Illness       Chief Complaint   Patient presents with    Shortness of Breath     Patient states recent BLE swelling with SOB.  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    Chronic pain     Closed dislocation of tarsometatarsal joint 2/3/2015    Closed fracture of multiple ribs 3/25/2017    Closed fracture of phalanx of foot 3/25/2017    Closed fracture of tarsal and metatarsal bones 3/25/2017    Complex regional  pain syndrome type 1 of lower extremity 4/12/2016    Depression     History of pulmonary embolism 3/25/2017    Iron deficiency anemia     Pulmonary embolism (HCC)     Seizures (HCC)     Treatment of healed fracture follow-up examination 3/25/2017      Past Surgical History:   Procedure Laterality Date    FOOT SURGERY      GASTRECTOMY  2006    GASTRIC BYPASS      HERNIA REPAIR      HYSTERECTOMY        Family History   Problem Relation Age of Onset    Heart disease Mother     Cancer Mother     Lung disease Maternal Grandmother       Social History     Tobacco Use    Smoking status: Every Day     Current packs/day: 0.50     Average packs/day: 0.5 packs/day for 25.0 years (12.5 ttl pk-yrs)     Types: Cigarettes    Smokeless tobacco: Never   Vaping Use    Vaping status: Every Day    Substances: Nicotine   Substance Use Topics    Alcohol use: No    Drug use: No      E-Cigarette/Vaping    E-Cigarette Use Current Every Day User       E-Cigarette/Vaping Substances    Nicotine Yes       I have reviewed and agree with the history as documented.     Shortness of breath over the past 4 days and bilateral leg edema does have a history of pulmonary embolism currently on Eliquis denies missing any dosages did have some chills without fever no bodyaches.  Room air saturations are 99% and lungs are clear on examination patient states that the increase Lasix over the past 4 days did help with the bilateral leg swelling but now she complains of shortness of breath      History provided by:  Patient  Medical Problem  Location:  Respiratory  Quality:  Difficulty  Severity:  Moderate  Onset quality:  Gradual  Duration:  4 days  Timing:  Constant  Progression:  Waxing and waning  Chronicity:  New  Context:  Shortness of breath over the past 4 days  Associated symptoms: shortness of breath    Associated symptoms: no chest pain and no fever        Review of Systems   Constitutional:  Positive for chills. Negative for fever.   Respiratory:   Positive for shortness of breath.    Cardiovascular:  Positive for leg swelling. Negative for chest pain.   All other systems reviewed and are negative.          Objective       ED Triage Vitals   Temperature Pulse Blood Pressure Respirations SpO2 Patient Position - Orthostatic VS   04/13/25 0647 04/13/25 0647 04/13/25 0647 04/13/25 0647 04/13/25 0647 04/13/25 0647   98.1 °F (36.7 °C) 87 123/57 18 99 % Lying      Temp Source Heart Rate Source BP Location FiO2 (%) Pain Score    04/13/25 0647 04/13/25 0800 04/13/25 0647 -- 04/13/25 0647    Oral Monitor Right arm  7      Vitals      Date and Time Temp Pulse SpO2 Resp BP Pain Score FACES Pain Rating User   04/13/25 1525 97.6 °F (36.4 °C) 82 100 % 20 109/73 -- --    04/13/25 1520 97.7 °F (36.5 °C) 90 -- 20 -- -- --    04/13/25 1510 97.7 °F (36.5 °C) -- -- 20 -- -- --    04/13/25 1510 -- 84 99 % -- 103/64 -- -- [facility]   04/13/25 1322 -- -- -- -- -- 7 --    04/13/25 1259 -- -- -- -- -- 6 --    04/13/25 1215 97.9 °F (36.6 °C) 70 100 % 20 105/69 -- -- DII   04/13/25 0900 -- 72 95 % 22 109/65 -- -- SM   04/13/25 0800 -- 72 99 % 21 120/66 -- -- SM   04/13/25 0700 -- 82 100 % 22 104/58 -- -- SV   04/13/25 0647 98.1 °F (36.7 °C) 87 99 % 18 123/57 7 -- SV            Physical Exam  Vitals and nursing note reviewed.   Constitutional:       General: She is not in acute distress.     Appearance: She is not ill-appearing, toxic-appearing or diaphoretic.   HENT:      Head: Normocephalic and atraumatic.      Right Ear: Tympanic membrane, ear canal and external ear normal.      Left Ear: Tympanic membrane, ear canal and external ear normal.   Eyes:      General:         Right eye: No discharge.         Left eye: No discharge.      Extraocular Movements: Extraocular movements intact.      Pupils: Pupils are equal, round, and reactive to light.   Cardiovascular:      Rate and Rhythm: Normal rate and regular rhythm.      Pulses: Normal pulses.      Heart sounds: Murmur heard.      No  friction rub. No gallop.   Pulmonary:      Effort: No respiratory distress.      Breath sounds: No stridor. No wheezing, rhonchi or rales.   Abdominal:      General: There is no distension.      Palpations: Abdomen is soft.      Tenderness: There is no abdominal tenderness. There is no guarding or rebound.   Musculoskeletal:         General: No swelling, tenderness, deformity or signs of injury.      Cervical back: Neck supple. No rigidity.      Right lower leg: No edema.      Left lower leg: No edema.   Skin:     General: Skin is warm and dry.      Coloration: Skin is not jaundiced.      Findings: No bruising, erythema or rash.   Neurological:      General: No focal deficit present.      Mental Status: She is alert and oriented to person, place, and time.      Cranial Nerves: No cranial nerve deficit.      Sensory: No sensory deficit.   Psychiatric:         Mood and Affect: Mood normal.         Behavior: Behavior normal.         Thought Content: Thought content normal.         Results Reviewed       Procedure Component Value Units Date/Time    Retic Count [[id number]]  (Normal) Collected: 04/13/25 0738    Lab Status: Final result Specimen: Blood from Arm, Right Updated: 04/13/25 1404     Retic Ct Abs 45,800     Retic Ct Pct 1.25 %     HS Troponin I 4hr [[id number]] Collected: 04/13/25 1124    Lab Status: Final result Specimen: Blood from Arm, Right Updated: 04/13/25 1156     hs TnI 4hr <2 ng/L      Delta 4hr hsTnI --    Lactic acid, plasma (w/reflex if result > 2.0) [[id number]]  (Normal) Collected: 04/13/25 1100    Lab Status: Final result Specimen: Blood from Arm, Left Updated: 04/13/25 1125     LACTIC ACID 1.2 mmol/L     Narrative:      Result may be elevated if tourniquet was used during collection.    UA w Reflex to Microscopic w Reflex to Culture [[id number]]  (Abnormal) Collected: 04/13/25 1102    Lab Status: Final result Specimen: Urine, Clean Catch Updated: 04/13/25 1120     Color, UA Light Yellow     Clarity,  UA Clear     Specific Gravity, UA <1.005     pH, UA 8.0     Leukocytes, UA Negative     Nitrite, UA Negative     Protein, UA Negative mg/dl      Glucose, UA Negative mg/dl      Ketones, UA Negative mg/dl      Urobilinogen, UA <2.0 mg/dl      Bilirubin, UA Negative     Occult Blood, UA Negative    Blood culture #2 [[id number]] Collected: 04/13/25 1100    Lab Status: In process Specimen: Blood from Arm, Right Updated: 04/13/25 1105    Blood culture #1 [[id number]] Collected: 04/13/25 1100    Lab Status: In process Specimen: Blood from Arm, Left Updated: 04/13/25 1105    HS Troponin I 2hr [[id number]] Collected: 04/13/25 0923    Lab Status: Final result Specimen: Blood from Arm, Right Updated: 04/13/25 1011     hs TnI 2hr <2 ng/L      Delta 2hr hsTnI --    FLU/RSV/COVID - if FLU/RSV clinically relevant (2hr TAT) [[id number]]  (Normal) Collected: 04/13/25 0738    Lab Status: Final result Specimen: Nares from Nose Updated: 04/13/25 0838     SARS-CoV-2 Negative     INFLUENZA A PCR Negative     INFLUENZA B PCR Negative     RSV PCR Negative    Narrative:      This test has been performed using the CoV-2/Flu/RSV plus assay on the [employer] GeneXpert platform. This test has been validated by the  and verified by the performing laboratory.     This test is designed to amplify and detect the following: nucleocapsid (N), envelope (E), and RNA-dependent RNA polymerase (RdRP) genes of the SARS-CoV-2 genome; matrix (M), basic polymerase (PB2), and acidic protein (PA) segments of the influenza A genome; matrix (M) and non-structural protein (NS) segments of the influenza B genome, and the nucleocapsid genes of RSV A and RSV B.     Positive results are indicative of the presence of Flu A, Flu B, RSV, and/or SARS-CoV-2 RNA. Positive results for SARS-CoV-2 or suspected novel influenza should be reported to state, local, or federal health departments according to local reporting requirements.      All results should be assessed in  conjunction with clinical presentation and other laboratory markers for clinical management.     FOR PEDIATRIC PATIENTS - copy/paste COVID Guidelines URL to browser: [email or web address]       Protime-INR [[id number]]  (Abnormal) Collected: 04/13/25 0738    Lab Status: Final result Specimen: Blood from Arm, Right Updated: 04/13/25 0828     Protime 15.8 seconds      INR 1.21    Narrative:      INR Therapeutic Range    Indication                                             INR Range      Atrial Fibrillation                                               2.0-3.0  Hypercoagulable State                                    2.0.2.3  Left Ventricular Asist Device                            2.0-3.0  Mechanical Heart Valve                                  -    Aortic(with afib, MI, embolism, HF, LA enlargement,    and/or coagulopathy)                                     2.0-3.0 (2.5-3.5)     Mitral                                                             2.5-3.5  Prosthetic/Bioprosthetic Heart Valve               2.0-3.0  Venous thromboembolism (VTE: VT, PE        2.0-3.0    APTT [[id number]]  (Abnormal) Collected: 04/13/25 0738    Lab Status: Final result Specimen: Blood from Arm, Right Updated: 04/13/25 0828     PTT 40 seconds     Procalcitonin [[id number]]  (Abnormal) Collected: 04/13/25 0738    Lab Status: Final result Specimen: Blood from Arm, Right Updated: 04/13/25 0811     Procalcitonin 0.43 ng/ml     HS Troponin 0hr (reflex protocol) [[id number]]  (Normal) Collected: 04/13/25 0738    Lab Status: Final result Specimen: Blood from Arm, Right Updated: 04/13/25 0809     hs TnI 0hr <2 ng/L     B-Type Natriuretic Peptide(BNP) [[id number]]  (Abnormal) Collected: 04/13/25 0738    Lab Status: Final result Specimen: Blood from Arm, Right Updated: 04/13/25 0808      pg/mL     Comprehensive metabolic panel [[id number]]  (Abnormal) Collected: 04/13/25 0738    Lab Status:  Final result Specimen: Blood from Arm, Right Updated: 04/13/25 0801     Sodium 140 mmol/L      Potassium 3.8 mmol/L      Chloride 113 mmol/L      CO2 21 mmol/L      ANION GAP 6 mmol/L      BUN 6 mg/dL      Creatinine 0.51 mg/dL      Glucose 99 mg/dL      Calcium 8.2 mg/dL      Corrected Calcium 8.8 mg/dL      AST 7 U/L      ALT 7 U/L      Alkaline Phosphatase 80 U/L      Total Protein 6.0 g/dL      Albumin 3.3 g/dL      Total Bilirubin 0.23 mg/dL      eGFR 114 ml/min/1.73sq m     Narrative:      National Kid[employer] Foundation guidelines for Chronic Kidney Disease (CKD):     Stage 1 with normal or high GFR (GFR > 90 mL/min/1.73 square meters)    Stage 2 Mild CKD (GFR = 60-89 mL/min/1.73 square meters)    Stage 3A Moderate CKD (GFR = 45-59 mL/min/1.73 square meters)    Stage 3B Moderate CKD (GFR = 30-44 mL/min/1.73 square meters)    Stage 4 Severe CKD (GFR = 15-29 mL/min/1.73 square meters)    Stage 5 End Stage CKD (GFR <15 mL/min/1.73 square meters)  Note: GFR calculation is accurate only with a steady state creatinine    C-reactive protein [[id number]]  (Abnormal) Collected: 04/13/25 0738    Lab Status: Final result Specimen: Blood from Arm, Right Updated: 04/13/25 0759     CRP 86.6 mg/L     CBC and differential [[id number]]  (Abnormal) Collected: 04/13/25 0738    Lab Status: Final result Specimen: Blood from Arm, Right Updated: 04/13/25 0745     WBC 16.13 Thousand/uL      RBC 3.65 Million/uL      Hemoglobin 6.7 g/dL      Hematocrit 24.4 %      MCV 67 fL      MCH 18.4 pg      MCHC 27.5 g/dL      RDW 19.0 %      MPV 8.8 fL      Platelets 198 Thousands/uL      nRBC 0 /100 WBCs      Segmented % 78 %      Immature Grans % 1 %      Lymphocytes % 14 %      Monocytes % 5 %      Eosinophils Relative 2 %      Basophils Relative 0 %      Absolute Neutrophils 12.80 Thousands/µL      Absolute Immature Grans 0.16 Thousand/uL      Absolute Lymphocytes 2.17 Thousands/µL      Absolute Monocytes 0.73 Thousand/µL      Eosinophils  Absolute 0.24 Thousand/µL      Basophils Absolute 0.03 Thousands/µL             CTA chest pe study   Final Interpretation by Severo Raman MD (04/13 1000)      No acute pulmonary embolus.      Peribronchovascular groundglass and nodular opacities throughout both lungs, likely infectious/inflammatory pneumonia.                  Workstation performed: [id number]         XR chest 1 view portable   Final Interpretation by Severo Raman MD (04/13 1003)      Hazy bilateral opacities are more apparent on concurrent chest CT, see separate report. No dense consolidation.            Workstation performed: [id number]          VAS VENOUS DUPLEX - LOWER LIMB BILATERAL    (Results Pending)       ECG 12 Lead Documentation Only    Date/Time: 4/13/2025 7:36 AM    Performed by: Kamran Romo DO  Authorized by: Kamran Romo DO    ECG reviewed by me, the ED Provider: yes    Patient location:  ED  Rate:     ECG rate:  87  Rhythm:     Rhythm: sinus rhythm    Conduction:     Conduction: normal    T waves:     T waves: normal        ED Medication and Procedure Management   Prior to Admission Medications   Prescriptions Last Dose Informant Patient Reported? Taking?   Diclofenac Sodium (VOLTAREN) 1 % Not Taking  No No   Sig: Apply 2 g topically every 6 (six) hours as needed (right wrist pain)   Patient not taking: Reported on 4/13/2025   SUMAtriptan (IMITREX) 25 mg tablet Past Month  Yes Yes   Sig: TAKE 1 TABLET BY MOUTH AS NEEDED. MAY REPEAT IN 2 HOURS *MAX DAILY DOSE 2 TABS*   acetaminophen (TYLENOL) 650 mg CR tablet Not Taking  No No   Sig: Take 1 tablet (650 mg total) by mouth every 8 (eight) hours as needed for mild pain   Patient not taking: Reported on 4/13/2025   acetaminophen-codeine (TYLENOL #3) 300-30 mg per tablet Not Taking  Yes No   Sig: TAKE 1 - 2 TABLETS BY MOUTH EVERY 6 HOURS LIMIT 8 TABLETS PER DAY   Patient not taking: Reported on 4/13/2025   apixaban (ELIQUIS) 5 mg 4/13/2025  No Yes   Sig:  Take 2 tabs twice a day for 3 days then 1 tab twice a day   baclofen 20 mg tablet 4/13/2025  Yes Yes   Sig: Take 20 mg by mouth 2 (two) times a day   busPIRone (BUSPAR) 15 mg tablet 4/13/2025  Yes Yes   Sig: Take 15 mg by mouth 3 (three) times a day   gabapentin (NEURONTIN) 800 mg tablet 4/13/2025  Yes Yes   Sig: Take 1 tablet by mouth 4 (four) times a day     hydrOXYzine pamoate (VISTARIL) 50 mg capsule 4/13/2025  Yes Yes   Sig: Take 1 capsule by mouth 3 (three) times a day   lidocaine (LIDODERM) 5 %   No No   Sig: Apply 2 patches topically over 12 hours every 24 hours Remove & Discard patch within 12 hours or as directed by MD   morphine (MS CONTIN) 15 mg 12 hr tablet 4/12/2025  Yes Yes   Sig: Take 1 tablet by mouth 2 (two) times a day   oxyCODONE-acetaminophen (PERCOCET) 5-325 mg per tablet 4/12/2025  Yes Yes   Sig: Take 1 tablet by mouth every 4 (four) hours as needed   propranolol (INDERAL LA) 60 mg 24 hr capsule 4/13/2025  Yes Yes   rOPINIRole (REQUIP) 4 mg tablet 4/12/2025  Yes Yes   Sig: Take 8 mg by mouth daily at bedtime     traMADol (ULTRAM) 50 mg tablet Not Taking  Yes No   Patient not taking: Reported on 4/13/2025   venlafaxine (EFFEXOR) 37.5 mg tablet 4/13/2025  Yes Yes   Sig: Take 150 mg by mouth 2 (two) times a day      Facility-Administered Medications: None     Current Discharge Medication List        CONTINUE these medications which have NOT CHANGED    Details   apixaban (ELIQUIS) 5 mg Take 2 tabs twice a day for 3 days then 1 tab twice a day  Qty: 60 tablet, Refills: 0    Associated Diagnoses: Other acute pulmonary embolism without acute cor pulmonale (HCC)      baclofen 20 mg tablet Take 20 mg by mouth 2 (two) times a day      busPIRone (BUSPAR) 15 mg tablet Take 15 mg by mouth 3 (three) times a day      gabapentin (NEURONTIN) 800 mg tablet Take 1 tablet by mouth 4 (four) times a day        hydrOXYzine pamoate (VISTARIL) 50 mg capsule Take 1 capsule by mouth 3 (three) times a day      morphine  (MS CONTIN) 15 mg 12 hr tablet Take 1 tablet by mouth 2 (two) times a day      oxyCODONE-acetaminophen (PERCOCET) 5-325 mg per tablet Take 1 tablet by mouth every 4 (four) hours as needed      propranolol (INDERAL LA) 60 mg 24 hr capsule       rOPINIRole (REQUIP) 4 mg tablet Take 8 mg by mouth daily at bedtime        SUMAtriptan (IMITREX) 25 mg tablet TAKE 1 TABLET BY MOUTH AS NEEDED. MAY REPEAT IN 2 HOURS *MAX DAILY DOSE 2 TABS*      venlafaxine (EFFEXOR) 37.5 mg tablet Take 150 mg by mouth 2 (two) times a day      acetaminophen (TYLENOL) 650 mg CR tablet Take 1 tablet (650 mg total) by mouth every 8 (eight) hours as needed for mild pain  Qty: 30 tablet, Refills: 0    Associated Diagnoses: Multiple rib fractures      acetaminophen-codeine (TYLENOL #3) 300-30 mg per tablet TAKE 1 - 2 TABLETS BY MOUTH EVERY 6 HOURS LIMIT 8 TABLETS PER DAY  Refills: 1      Diclofenac Sodium (VOLTAREN) 1 % Apply 2 g topically every 6 (six) hours as needed (right wrist pain)  Qty: 50 g, Refills: 0    Associated Diagnoses: Right wrist pain      lidocaine (LIDODERM) 5 % Apply 2 patches topically over 12 hours every 24 hours Remove & Discard patch within 12 hours or as directed by MD  Qty: 30 patch, Refills: 0    Associated Diagnoses: Multiple rib fractures      traMADol (ULTRAM) 50 mg tablet            No discharge procedures on file.  ED SEPSIS DOCUMENTATION   Time reflects when diagnosis was documented in both MDM as applicable and the Disposition within this note       Time User Action Codes Description Comment    4/13/2025 10:31 AM Kamran Romo [J18.9] Multifocal pneumonia     4/13/2025 10:31 AM Kamran Romo [D64.9] Anemia            Initial Sepsis Screening       Row Name 04/13/25 1130 04/13/25 1043             Is the patient's history suggestive of a new or worsening infection? Yes (Proceed)  -SYL Yes (Proceed)  -SYL       Suspected source of infection pneumonia  -SYL urinary tract infection  -SYL       Indicate SIRS criteria  "Tachypnea > 20 resp per min;Leukocytosis (WBC > 54297 IJL) OR Leukopenia (WBC <4000 IJL) OR Bandemia (WBC >10% bands)  -SYL Tachycardia > 90 bpm;Leukocytosis (WBC > 78353 IJL) OR Leukopenia (WBC <4000 IJL) OR Bandemia (WBC >10% bands)  -SYL       Are two or more of the above signs & symptoms of infection both present and new to the patient? No  -SYL Yes (Proceed)  -SYL       Assess for evidence of organ dysfunction: Are any of the below criteria present within 6 hours of suspected infection and SIRS criteria that are NOT considered to be chronic conditions? -- Lactate > 2.0  -SYL       Date of presentation of severe sepsis -- 04/13/25  -SYL       Time of presentation of severe sepsis -- 1044  -       Sepsis Note: Click \"NEXT\" below (NOT \"close\") to generate sepsis note based on above information. -- YES (proceed by clicking \"NEXT\")  -SYL                 User Key  (r) = Recorded By, (t) = Taken By, (c) = Cosigned By      Initials Name Provider Type    SYL Romo DO Physician                       Kamran Romo DO  04/13/25 1552    "

## 2025-04-13 NOTE — ASSESSMENT & PLAN NOTE
Patient with 3 episodes of pulmonary bleeding in the past  Patient is on Eliquis 5 mg twice daily  Patient presented with shortness of breath  CT PE study without without PE  Patient mother has also history of PE  Continue Eliquis  Recommended check thrombosis panel outpatient

## 2025-04-13 NOTE — ASSESSMENT & PLAN NOTE
Patient with history of anxiety and depression  Patient is on BuSpar, Atarax as needed, venlafaxine at home  Continue current regimen
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
## 2025-04-13 NOTE — SEPSIS NOTE
"  Sepsis Note   Tamar Farrell 49 y.o. female MRN: [id number]  Unit/Bed#: ED 12 Encounter: [id number]       Initial Sepsis Screening       Row Name 04/13/25 1130 04/13/25 1043             Is the patient's history suggestive of a new or worsening infection? Yes (Proceed)  -SYL Yes (Proceed)  -SYL       Suspected source of infection pneumonia  -SYL urinary tract infection  -SYL       Indicate SIRS criteria Tachypnea > 20 resp per min;Leukocytosis (WBC > 82846 IJL) OR Leukopenia (WBC <4000 IJL) OR Bandemia (WBC >10% bands)  -SYL Tachycardia > 90 bpm;Leukocytosis (WBC > 05697 IJL) OR Leukopenia (WBC <4000 IJL) OR Bandemia (WBC >10% bands)  -SYL       Are two or more of the above signs & symptoms of infection both present and new to the patient? No  -SYL Yes (Proceed)  -SYL       Assess for evidence of organ dysfunction: Are any of the below criteria present within 6 hours of suspected infection and SIRS criteria that are NOT considered to be chronic conditions? -- Lactate > 2.0  -SYL       Date of presentation of severe sepsis -- 04/13/25  -SYL       Time of presentation of severe sepsis -- 1044  -SYL       Sepsis Note: Click \"NEXT\" below (NOT \"close\") to generate sepsis note based on above information. -- YES (proceed by clicking \"NEXT\")  -SYL                 User Key  (r) = Recorded By, (t) = Taken By, (c) = Cosigned By      Initials Name Provider Type    SYL Kamran Romo DO Physician                        Body mass index is 29.95 kg/m².  Wt Readings from Last 1 Encounters:   04/13/25 81.6 kg (180 lb)     IBW (Ideal Body Weight): 57 kg    Ideal body weight: 57 kg (125 lb 10.6 oz)  Adjusted ideal body weight: 66.9 kg (147 lb 6.4 oz)    "

## 2025-04-13 NOTE — ASSESSMENT & PLAN NOTE
Patient presented with shortness of breath for several weeks  Patient has history of several PEs in the past  Patient also complaining of bilateral lower extremity edema  CT PE showed no PE however showed Peribronchovascular groundglass and nodular opacities throughout both lungs, likely infectious/inflammatory pneumonia.  Plan  Possible etiology of shortness of breath including pneumonia, chronic PE, heart failure  Will check echocardiogram

## 2025-04-13 NOTE — ASSESSMENT & PLAN NOTE
Patient presents with bilateral lower extremity edema and pain  Patient has history of 3 PEs  CT PE study did not show any PE  Patient is on Eliquis at home  Patient reported improvement of her lower extremity edema compared to a weeks ago  Plan  Continue Eliquis  Check Doppler lower extremity venous bilateral lower extremity  Consider starting Lasix if needed  Will check echocardiogram

## 2025-04-13 NOTE — ASSESSMENT & PLAN NOTE
Patient met sepsis criteria through tachypnea and leukocytosis  Patient was given IV fluid and was started on Levaquin in the ED  Plan  See management after pneumonia

## 2025-04-13 NOTE — ASSESSMENT & PLAN NOTE
Patient has a history of iron deficient anemia  Patient reported that she was supposed to be starting on iron  Current hemoglobin 6.7  1 unit of packed red blood cells was ordered in the ED  Patient denied any symptoms of lightheadedness.  Patient denied dark stools  Plan  Check iron panel  Check reticulocyte count  Monitor hemoglobin  Consider starting iron if it is low  Patient will benefit from outpatient colonoscopy screening with GI referral

## 2025-04-14 ENCOUNTER — APPOINTMENT (INPATIENT)
Dept: NON INVASIVE DIAGNOSTICS | Facility: HOSPITAL | Age: 50
DRG: 720 | End: 2025-04-14
Payer: COMMERCIAL

## 2025-04-14 VITALS
HEART RATE: 102 BPM | TEMPERATURE: 98.6 F | DIASTOLIC BLOOD PRESSURE: 62 MMHG | HEIGHT: 65 IN | SYSTOLIC BLOOD PRESSURE: 103 MMHG | WEIGHT: 180 LBS | OXYGEN SATURATION: 99 % | BODY MASS INDEX: 29.99 KG/M2 | RESPIRATION RATE: 16 BRPM

## 2025-04-14 LAB
ABO GROUP BLD BPU: NORMAL
ALBUMIN SERPL BCG-MCNC: 3.3 G/DL (ref 3.5–5)
ALP SERPL-CCNC: 82 U/L (ref 34–104)
ALT SERPL W P-5'-P-CCNC: 6 U/L (ref 7–52)
ANION GAP SERPL CALCULATED.3IONS-SCNC: 6 MMOL/L (ref 4–13)
AORTIC ROOT: 2.8 CM
ASCENDING AORTA: 3.1 CM
AST SERPL W P-5'-P-CCNC: 8 U/L (ref 13–39)
BASOPHILS # BLD AUTO: 0.06 THOUSANDS/ÂΜL (ref 0–0.1)
BASOPHILS NFR BLD AUTO: 1 % (ref 0–1)
BILIRUB SERPL-MCNC: 0.34 MG/DL (ref 0.2–1)
BPU ID: NORMAL
BSA FOR ECHO PROCEDURE: 1.89 M2
BUN SERPL-MCNC: 5 MG/DL (ref 5–25)
CALCIUM ALBUM COR SERPL-MCNC: 8.8 MG/DL (ref 8.3–10.1)
CALCIUM SERPL-MCNC: 8.2 MG/DL (ref 8.4–10.2)
CHLORIDE SERPL-SCNC: 112 MMOL/L (ref 96–108)
CO2 SERPL-SCNC: 23 MMOL/L (ref 21–32)
CREAT SERPL-MCNC: 0.58 MG/DL (ref 0.6–1.3)
CROSSMATCH: NORMAL
DOP CALC LVOT AREA: 3.14 CM2
DOP CALC LVOT DIAMETER: 2 CM
E WAVE DECELERATION TIME: 149 MS
E/A RATIO: 1.18
EOSINOPHIL # BLD AUTO: 0.39 THOUSAND/ÂΜL (ref 0–0.61)
EOSINOPHIL NFR BLD AUTO: 3 % (ref 0–6)
ERYTHROCYTE [DISTWIDTH] IN BLOOD BY AUTOMATED COUNT: 21.7 % (ref 11.6–15.1)
FRACTIONAL SHORTENING: 32 (ref 28–44)
GFR SERPL CREATININE-BSD FRML MDRD: 110 ML/MIN/1.73SQ M
GLUCOSE SERPL-MCNC: 86 MG/DL (ref 65–140)
HCT VFR BLD AUTO: 30.5 % (ref 34.8–46.1)
HGB BLD-MCNC: 8.6 G/DL (ref 11.5–15.4)
IMM GRANULOCYTES # BLD AUTO: 0.1 THOUSAND/UL (ref 0–0.2)
IMM GRANULOCYTES NFR BLD AUTO: 1 % (ref 0–2)
INTERVENTRICULAR SEPTUM IN DIASTOLE (PARASTERNAL SHORT AXIS VIEW): 0.9 CM
INTERVENTRICULAR SEPTUM: 0.9 CM (ref 0.6–1.1)
L PNEUMO1 AG UR QL IA.RAPID: NEGATIVE
LAAS-AP2: 24.6 CM2
LAAS-AP4: 16.3 CM2
LEFT ATRIUM SIZE: 4.1 CM
LEFT ATRIUM VOLUME (MOD BIPLANE): 68 ML
LEFT ATRIUM VOLUME INDEX (MOD BIPLANE): 36 ML/M2
LEFT INTERNAL DIMENSION IN SYSTOLE: 3.2 CM (ref 2.1–4)
LEFT VENTRICULAR INTERNAL DIMENSION IN DIASTOLE: 4.7 CM (ref 3.5–6)
LEFT VENTRICULAR POSTERIOR WALL IN END DIASTOLE: 0.8 CM
LEFT VENTRICULAR STROKE VOLUME: 62 ML
LV EF US.2D.A4C+ESTIMATED: 65 %
LVSV (TEICH): 62 ML
LYMPHOCYTES # BLD AUTO: 3.67 THOUSANDS/ÂΜL (ref 0.6–4.47)
LYMPHOCYTES NFR BLD AUTO: 28 % (ref 14–44)
MAGNESIUM SERPL-MCNC: 2.2 MG/DL (ref 1.9–2.7)
MCH RBC QN AUTO: 19.7 PG (ref 26.8–34.3)
MCHC RBC AUTO-ENTMCNC: 28.2 G/DL (ref 31.4–37.4)
MCV RBC AUTO: 70 FL (ref 82–98)
MONOCYTES # BLD AUTO: 0.57 THOUSAND/ÂΜL (ref 0.17–1.22)
MONOCYTES NFR BLD AUTO: 4 % (ref 4–12)
MV E'TISSUE VEL-SEP: 9 CM/S
MV PEAK A VEL: 0.74 M/S
MV PEAK E VEL: 87 CM/S
MV STENOSIS PRESSURE HALF TIME: 43 MS
MV VALVE AREA P 1/2 METHOD: 5.12
NEUTROPHILS # BLD AUTO: 8.17 THOUSANDS/ÂΜL (ref 1.85–7.62)
NEUTS SEG NFR BLD AUTO: 63 % (ref 43–75)
NRBC BLD AUTO-RTO: 0 /100 WBCS
PHOSPHATE SERPL-MCNC: 3.7 MG/DL (ref 2.7–4.5)
PLATELET # BLD AUTO: 197 THOUSANDS/UL (ref 149–390)
PMV BLD AUTO: 9.4 FL (ref 8.9–12.7)
POTASSIUM SERPL-SCNC: 3.7 MMOL/L (ref 3.5–5.3)
PROT SERPL-MCNC: 6 G/DL (ref 6.4–8.4)
RA PRESSURE ESTIMATED: 3 MMHG
RBC # BLD AUTO: 4.36 MILLION/UL (ref 3.81–5.12)
RIGHT ATRIUM AREA SYSTOLE A4C: 15.8 CM2
RIGHT VENTRICLE ID DIMENSION: 3.2 CM
RV PSP: 30 MMHG
S PNEUM AG UR QL: NEGATIVE
SL CV LEFT ATRIUM LENGTH A2C: 5.8 CM
SL CV LV EF: 60
SL CV PED ECHO LEFT VENTRICLE DIASTOLIC VOLUME (MOD BIPLANE) 2D: 103 ML
SL CV PED ECHO LEFT VENTRICLE SYSTOLIC VOLUME (MOD BIPLANE) 2D: 41 ML
SL CV TR MAX PG ANTEGRADE: 27 MMHG
SODIUM SERPL-SCNC: 141 MMOL/L (ref 135–147)
TR MAX PG: 27 MMHG
TR PEAK VELOCITY: 2.6 M/S
TRICUSPID ANNULAR PLANE SYSTOLIC EXCURSION: 2.3 CM
TRICUSPID VALVE PEAK REGURGITATION VELOCITY: 2.62 M/S
TV MEAN GRADIENT: 19 MMHG
TV MV D: 2.08 M/S
TV VTI: 80.42 CM
UNIT DISPENSE STATUS: NORMAL
UNIT PRODUCT CODE: NORMAL
UNIT PRODUCT VOLUME: 300 ML
UNIT RH: NORMAL
WBC # BLD AUTO: 12.96 THOUSAND/UL (ref 4.31–10.16)

## 2025-04-14 PROCEDURE — 80053 COMPREHEN METABOLIC PANEL: CPT

## 2025-04-14 PROCEDURE — 93970 EXTREMITY STUDY: CPT

## 2025-04-14 PROCEDURE — 93306 TTE W/DOPPLER COMPLETE: CPT | Performed by: INTERNAL MEDICINE

## 2025-04-14 PROCEDURE — 84100 ASSAY OF PHOSPHORUS: CPT

## 2025-04-14 PROCEDURE — 85025 COMPLETE CBC W/AUTO DIFF WBC: CPT

## 2025-04-14 PROCEDURE — 93970 EXTREMITY STUDY: CPT | Performed by: SURGERY

## 2025-04-14 PROCEDURE — 99239 HOSP IP/OBS DSCHRG MGMT >30: CPT | Performed by: STUDENT IN AN ORGANIZED HEALTH CARE EDUCATION/TRAINING PROGRAM

## 2025-04-14 PROCEDURE — 83735 ASSAY OF MAGNESIUM: CPT

## 2025-04-14 PROCEDURE — 93306 TTE W/DOPPLER COMPLETE: CPT

## 2025-04-14 RX ORDER — FERROUS SULFATE 324(65)MG
324 TABLET, DELAYED RELEASE (ENTERIC COATED) ORAL
Qty: 60 TABLET | Refills: 0 | Status: SHIPPED | OUTPATIENT
Start: 2025-04-14

## 2025-04-14 RX ORDER — LEVOFLOXACIN 250 MG/1
750 TABLET, FILM COATED ORAL EVERY 24 HOURS
Status: DISCONTINUED | OUTPATIENT
Start: 2025-04-14 | End: 2025-04-14

## 2025-04-14 RX ADMIN — NICOTINE 1 PATCH: 14 PATCH, EXTENDED RELEASE TRANSDERMAL at 08:13

## 2025-04-14 RX ADMIN — OXYCODONE HYDROCHLORIDE AND ACETAMINOPHEN 2 TABLET: 5; 325 TABLET ORAL at 10:04

## 2025-04-14 RX ADMIN — LEVOFLOXACIN 750 MG: 750 INJECTION, SOLUTION INTRAVENOUS at 12:41

## 2025-04-14 RX ADMIN — GABAPENTIN 800 MG: 400 CAPSULE ORAL at 12:41

## 2025-04-14 RX ADMIN — VENLAFAXINE HYDROCHLORIDE 150 MG: 150 CAPSULE, EXTENDED RELEASE ORAL at 08:08

## 2025-04-14 RX ADMIN — APIXABAN 5 MG: 5 TABLET, FILM COATED ORAL at 08:08

## 2025-04-14 RX ADMIN — AMOXICILLIN AND CLAVULANATE POTASSIUM 1 TABLET: 875; 125 TABLET, COATED ORAL at 13:24

## 2025-04-14 RX ADMIN — BACLOFEN 20 MG: 10 TABLET ORAL at 08:07

## 2025-04-14 RX ADMIN — MORPHINE SULFATE 15 MG: 15 TABLET, EXTENDED RELEASE ORAL at 08:07

## 2025-04-14 RX ADMIN — GABAPENTIN 800 MG: 400 CAPSULE ORAL at 08:07

## 2025-04-14 RX ADMIN — BUSPIRONE HYDROCHLORIDE 15 MG: 15 TABLET ORAL at 08:08

## 2025-04-14 NOTE — NURSING NOTE
Patient's IV's were removed. Reviewed discharge paperwork with patient. Patient provided with a note for her work by [facility] provider. Patient taken down to her car via wheelchair, significant other present.

## 2025-04-14 NOTE — ASSESSMENT & PLAN NOTE
Patient presents with shortness of breath for several weeks  CT PE study showed no PE but also showed Peribronchovascular groundglass and nodular opacities throughout both lungs, likely infectious/inflammatory pneumonia.  Patient reported dry cough and chills  Leukocytic count is 16, lactic acid is normal  Pro-Ray 0.43  Patient was given IV Levaquin in the ED  Plan  IV Levaquin transitioned to augmentin po bid   Dc'd IV fluid  strep pneumoniae, Legionella negative   Follow-up on blood cultures  Trend fever  Trend white cell count

## 2025-04-14 NOTE — ASSESSMENT & PLAN NOTE
Patient presents with bilateral lower extremity edema and pain  Patient has history of 3 PEs  CT PE study did not show any PE  Patient is on Eliquis at home  Patient reported improvement of her lower extremity edema compared to a weeks ago  Plan  Continue Eliquis  See mgx above

## 2025-04-14 NOTE — PLAN OF CARE
Problem: Potential for Falls  Goal: Patient will remain free of falls  Description: INTERVENTIONS:- Educate patient/family on patient safety including physical limitations- Instruct patient to call for assistance with activity - Consult OT/PT to assist with strengthening/mobility - Keep Call bell within reach- Keep bed low and locked with side rails adjusted as appropriate- Keep care items and personal belongings within reach- Initiate and maintain comfort rounds- Make Fall Risk Sign visible to staff- Offer Toileting every 2 Hours, in advance of need- Initiate/Maintain bed/chair alarm- Obtain necessary fall risk management equipment: - Apply yellow socks and bracelet for high fall risk patients- Consider moving patient to room near nurses station  INTERVENTIONS:- Educate patient/family on patient safety including physical limitations- Instruct patient to call for assistance with activity - Consult OT/PT to assist with strengthening/mobility - Keep Call bell within reach- Keep bed low and locked with side rails adjusted as appropriate- Keep care items and personal belongings within reach- Initiate and maintain comfort rounds- Make Fall Risk Sign visible to staff- Offer Toileting every 2 Hours, in advance of need- Initiate/Maintain bed/chair alarm- Obtain necessary fall risk management equipment: - Apply yellow socks and bracelet for high fall risk patients- Consider moving patient to room near nurses station  Outcome: Progressing     Problem: PAIN - ADULT  Goal: Verbalizes/displays adequate comfort level or baseline comfort level  Description: Interventions:- Encourage patient to monitor pain and request assistance- Assess pain using appropriate pain scale- Administer analgesics based on type and severity of pain and evaluate response- Implement non-pharmacological measures as appropriate and evaluate response- Consider cultural and social influences on pain and pain management- Notify physician/advanced  practitioner if interventions unsuccessful or patient reports new pain  Outcome: Progressing     Problem: INFECTION - ADULT  Goal: Absence or prevention of progression during hospitalization  Description: INTERVENTIONS:- Assess and monitor for signs and symptoms of infection- Monitor lab/diagnostic results- Monitor all insertion sites, i.e. indwelling lines, tubes, and drains- Monitor endotracheal if appropriate and nasal secretions for changes in amount and color- Shipman appropriate cooling/warming therapies per order- Administer medications as ordered- Instruct and encourage patient and family to use good hand hygiene technique- Identify and instruct in appropriate isolation precautions for identified infection/condition  Outcome: Progressing  Goal: Absence of fever/infection during neutropenic period  Description: INTERVENTIONS:- Monitor WBC  Outcome: Progressing     Problem: SAFETY ADULT  Goal: Patient will remain free of falls  Description: INTERVENTIONS:- Educate patient/family on patient safety including physical limitations- Instruct patient to call for assistance with activity - Consult OT/PT to assist with strengthening/mobility - Keep Call bell within reach- Keep bed low and locked with side rails adjusted as appropriate- Keep care items and personal belongings within reach- Initiate and maintain comfort rounds- Make Fall Risk Sign visible to staff- Offer Toileting every 2 Hours, in advance of need- Initiate/Maintain bed/chair alarm- Obtain necessary fall risk management equipment: - Apply yellow socks and bracelet for high fall risk patients- Consider moving patient to room near nurses station  INTERVENTIONS:- Educate patient/family on patient safety including physical limitations- Instruct patient to call for assistance with activity - Consult OT/PT to assist with strengthening/mobility - Keep Call bell within reach- Keep bed low and locked with side rails adjusted as appropriate- Keep care items and  personal belongings within reach- Initiate and maintain comfort rounds- Make Fall Risk Sign visible to staff- Offer Toileting every 2 Hours, in advance of need- Initiate/Maintain bed/chairalarm- Obtain necessary fall risk management equipment: - Apply yellow socks and bracelet for high fall risk patients- Consider moving patient to room near nurses station  Outcome: Progressing  Goal: Maintain or return to baseline ADL function  Description: INTERVENTIONS:-  Assess patient's ability to carry out ADLs; assess patient's baseline for ADL function and identify physical deficits which impact ability to perform ADLs (bathing, care of mouth/teeth, toileting, grooming, dressing, etc.)- Assess/evaluate cause of self-care deficits - Assess range of motion- Assess patient's mobility; develop plan if impaired- Assess patient's need for assistive devices and provide as appropriate- Encourage maximum independence but intervene and supervise when necessary- Involve family in performance of ADLs- Assess for home care needs following discharge - Consider OT consult to assist with ADL evaluation and planning for discharge- Provide patient education as appropriate  Outcome: Progressing  Goal: Maintains/Returns to pre admission functional level  Description: INTERVENTIONS:- Perform AM-PAC 6 Click Basic Mobility/ Daily Activity assessment daily.- Set and communicate daily mobility goal to care team and patient/family/caregiver. - Collaborate with rehabilitation services on mobility goals if consulted- Perform Range of Motion 23 times a day.- Reposition patient every 2 hours.- Dangle patient 2 times a day- Stand patient 2 times a day- Ambulate patient 2 times a day- Out of bed to chair 2 times a day - Out of bed for meals 2 times a day- Out of bed for toileting- Record patient progress and toleration of activity level   Outcome: Progressing     Problem: DISCHARGE PLANNING  Goal: Discharge to home or other facility with appropriate  resources  Description: INTERVENTIONS:- Identify barriers to discharge w/patient and caregiver- Arrange for needed discharge resources and transportation as appropriate- Identify discharge learning needs (meds, wound care, etc.)- Arrange for interpretive services to assist at discharge as needed- Refer to Case Management Department for coordinating discharge planning if the patient needs post-hospital services based on physician/advanced practitioner order or complex needs related to functional status, cognitive ability, or social support system  Outcome: Progressing     Problem: Knowledge Deficit  Goal: Patient/family/caregiver demonstrates understanding of disease process, treatment plan, medications, and discharge instructions  Description: Complete learning assessment and assess knowledge base.Interventions:- Provide teaching at level of understanding- Provide teaching via preferred learning methods  Outcome: Progressing

## 2025-04-14 NOTE — DISCHARGE INSTR - AVS FIRST PAGE
You are to follow-up with your PCP in 1 to 2 weeks    You will be discharged with the following medications:  Augmentin 875 mg to be taken twice a day for 4 more days  Iron supplementation to be taken twice a day.
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
## 2025-04-14 NOTE — UTILIZATION REVIEW
Initial Clinical Review    Admission: Date/Time/Statement:   Admission Orders (From admission, onward)       Ordered        04/13/25 1133  INPATIENT ADMISSION  Once                          Orders Placed This Encounter   Procedures    INPATIENT ADMISSION     Standing Status:   Standing     Number of Occurrences:   1     Level of Care:   Med Surg [16]     Estimated length of stay:   More than 2 Midnights     Certification:   I certify that inpatient services are medically necessary for this patient for a duration of greater than two midnights. See H&P and MD Progress Notes for additional information about the patient's course of treatment.     ED Arrival Information       Expected   -    Arrival   4/13/2025 06:35    Acuity   Emergent              Means of arrival   Walk-In    Escorted by   [facility]    Service   Hospitalist    Admission type   Emergency              Arrival complaint   Shortness of Breath             Chief Complaint   Patient presents with    Shortness of Breath     Patient states recent BLE swelling with SOB.  Hx blood clots       Initial Presentation:   49 yof to ER from home for BLE edema x 4 days, now SOB. Hx PE on Elquis, epilepsy, anxiety, depression, smoking. Presents with cough, SOB, leg swelling. Admission CXR: Hazy bilateral opacities are more apparent on concurrent chest CT, see separate report. No dense consolidation. CTA chest PE study: Peribronchovascular groundglass and nodular opacities throughout both lungs, likely infectious/inflammatory pneumonia. Labs: WBC 16.13, Hgb 6.7, Cl 113, Cr 1.51, Ca 8.2, ast 7, tprot 6.0, alb 3.3, PT 15.8, INR 1.21, PTT 40, procalcitonin 0.43, , ferritin 11, iron sat 2, iron 11, TIBC 452.2, CRP 86.6, u/a: spec grav <1.005.  Admitted to inpatient status for pneumonia. Plan: IVABT, cultures, transfusion.    Anticipated Length of Stay/Certification Statement:   Patient will be admitted on an observation basis with an anticipated length of stay of less  than 2 midnights secondary to pneumonia.     Date: 4/14/25   Day 2:   SOB 2nd pneumonia, chronic PE, heart failure. IVABT continued. Lungs clear, diminished, currently on RA. Continue to monitor respiratory status, O2 needs, VS, follow labs, follow up cultures.       ED Treatment-Medication Administration from 04/13/2025 0635 to 04/13/2025 1200         Date/Time Order Dose Route Action     04/13/2025 0831 iohexol (OMNIPAQUE) 350 MG/ML injection (MULTI-DOSE) 100 mL 85 mL Intravenous Given     04/13/2025 1108 levofloxacin (LEVAQUIN) IVPB (premix in dextrose) 750 mg 150 mL 750 mg Intravenous New Bag     04/13/2025 1105 sodium chloride 0.9 % bolus 1,000 mL 1,000 mL Intravenous New Bag            Scheduled Medications:  apixaban, 5 mg, Oral, BID  baclofen, 20 mg, Oral, BID  busPIRone, 15 mg, Oral, TID  gabapentin, 800 mg, Oral, 4x Daily  levofloxacin, 750 mg, Intravenous, Q24H  morphine, 15 mg, Oral, BID  nicotine, 1 patch, Transdermal, Daily  propranolol, 60 mg, Oral, Daily  rOPINIRole, 8 mg, Oral, HS  venlafaxine, 150 mg, Oral, BID With Meals      Continuous IV Infusions:     PRN Meds:  acetaminophen, 650 mg, Oral, Q6H PRN  hydrOXYzine HCL, 25 mg, Oral, Q6H PRN  oxyCODONE-acetaminophen, 2 tablet, Oral, Q4H PRN  SUMAtriptan, 25 mg, Oral, Once PRN      ED Triage Vitals [04/13/25 0647]   Temperature Pulse Respirations Blood Pressure SpO2 Pain Score   98.1 °F (36.7 °C) 87 18 123/57 99 % 7     Weight (last 2 days)       Date/Time Weight    04/14/25 0748 81.6 (180)    04/13/25 12:15:24 81.6 (180)    04/13/25 0647 81.6 (180)            Vital Signs (last 3 days)       Date/Time Temp Pulse Resp BP MAP (mmHg) SpO2 O2 Device Patient Position - Orthostatic VS Pain    04/14/25 1004 -- -- -- -- -- -- -- -- 6    04/14/25 09:53:04 -- 102 -- 103/62 76 99 % -- -- --    04/14/25 0807 -- -- -- -- -- -- -- -- Med Not Given for Pain - for MAR use only    04/14/25 0800 -- -- -- -- -- -- None (Room air) -- --    04/14/25 0748 -- 97 -- 108/59  -- -- -- -- --    04/14/25 0000 -- -- -- -- -- -- -- -- No Pain    04/13/25 2140 -- -- -- -- -- -- -- -- 8    04/13/25 2003 -- -- -- -- -- -- -- -- 8    04/13/25 19:11:56 98.6 °F (37 °C) 97 16 108/59 75 99 % -- -- --    04/13/25 17:41:16 97 °F (36.1 °C) 90 20 109/66 80 100 % None (Room air) Lying --    04/13/25 1602 -- -- -- -- -- -- -- -- 6 04/13/25 1525 97.6 °F (36.4 °C) 82 20 109/73 -- 100 % None (Room air) -- --    04/13/25 1522 -- -- -- -- -- -- None (Room air) -- --    04/13/25 1520 97.7 °F (36.5 °C) 90 20 -- -- -- -- -- --    04/13/25 15:10:03 97.7 °F (36.5 °C) 84 20 103/64 77 99 % None (Room air) Lying --    04/13/25 1322 -- -- -- -- -- -- -- -- 7    04/13/25 1259 -- -- -- -- -- -- -- -- 6    04/13/25 12:15:24 97.9 °F (36.6 °C) 70 20 105/69 81 100 % -- -- --    04/13/25 0900 -- 72 22 109/65 82 95 % None (Room air) Lying --    04/13/25 0800 -- 72 21 120/66 87 99 % None (Room air) Lying --    04/13/25 0702 -- -- -- -- -- -- None (Room air) -- --    04/13/25 0700 -- 82 22 104/58 75 100 % -- -- --    04/13/25 0647 98.1 °F (36.7 °C) 87 18 123/57 -- 99 % -- Lying 7              Pertinent Labs/Diagnostic Test Results:   Radiology:   VAS VENOUS DUPLEX - LOWER LIMB BILATERAL   Final Interpretation by Shreyas Lucero DO (04/14 0935)      CTA chest pe study   Final Interpretation by Severo Raman MD (04/13 1000)      No acute pulmonary embolus.      Peribronchovascular groundglass and nodular opacities throughout both lungs, likely infectious/inflammatory pneumonia.                  Workstation performed: [id number]         XR chest 1 view portable   Final Interpretation by Severo Raman MD (04/13 1003)      Hazy bilateral opacities are more apparent on concurrent chest CT, see separate report. No dense consolidation.            Workstation performed: [id number]           Cardiology:  Echo complete w/ contrast if indicated   Final Result by Berenice Mendoza MD (04/14 0961)        Left  "Ventricle: Left ventricular cavity size is normal. Wall thickness    is normal. The left ventricular ejection fraction is 60-65%. Systolic    function is normal. Wall motion is normal. Diastolic function is normal.     Left Atrium: The atrium is mildly dilated (35-41 mL/m2).     Tricuspid Valve: There is mild regurgitation.         ECG 12 lead   Final Result by Dominick Herndon MD (04/13 1507)   Normal sinus rhythm   Normal ECG   When compared with ECG of 12-May-2024 11:39,   No significant change was found   Confirmed by Dominick Herndon ([id number]) on 4/13/2025 3:07:16 PM        GI:  No orders to display       Results from last 7 days   Lab Units 04/13/25  0738   SARS-COV-2  Negative     Results from last 7 days   Lab Units 04/14/25 0407 04/13/25  0738   WBC Thousand/uL 12.96* 16.13*   HEMOGLOBIN g/dL 8.6* 6.7*   HEMATOCRIT % 30.5* 24.4*   PLATELETS Thousands/uL 197 198   TOTAL NEUT ABS Thousands/µL 8.17* 12.80*     Results from last 7 days   Lab Units 04/13/25  0738   RETIC CT ABS  45,800   RETIC CT PCT % 1.25     Results from last 7 days   Lab Units 04/14/25 0407 04/13/25  0738   SODIUM mmol/L 141 140   POTASSIUM mmol/L 3.7 3.8   CHLORIDE mmol/L 112* 113*   CO2 mmol/L 23 21   ANION GAP mmol/L 6 6   BUN mg/dL 5 6   CREATININE mg/dL 0.58* 0.51*   EGFR ml/min/1.73sq m 110 114   CALCIUM mg/dL 8.2* 8.2*   MAGNESIUM mg/dL 2.2  --    PHOSPHORUS mg/dL 3.7  --      Results from last 7 days   Lab Units 04/14/25 0407 04/13/25  0738   AST U/L 8* 7*   ALT U/L 6* 7   ALK PHOS U/L 82 80   TOTAL PROTEIN g/dL 6.0* 6.0*   ALBUMIN g/dL 3.3* 3.3*   TOTAL BILIRUBIN mg/dL 0.34 0.23         Results from last 7 days   Lab Units 04/14/25 0407 04/13/25  0738   GLUCOSE RANDOM mg/dL 86 99             No results found for: \"BETA-HYDROXYBUTYRATE\"                   Results from last 7 days   Lab Units 04/13/25  1124 04/13/25  0923 04/13/25  0738   HS TNI 0HR ng/L  --   --  <2   HS TNI 2HR ng/L  --  <2  --    HS TNI 4HR ng/L <2  --   --  "         Results from last 7 days   Lab Units 04/13/25  0738   PROTIME seconds 15.8*   INR  1.21*   PTT seconds 40*         Results from last 7 days   Lab Units 04/13/25  0738   PROCALCITONIN ng/ml 0.43*     Results from last 7 days   Lab Units 04/13/25  1100   LACTIC ACID mmol/L 1.2             Results from last 7 days   Lab Units 04/13/25  0738   BNP pg/mL 207*     Results from last 7 days   Lab Units 04/13/25  1451   FERRITIN ng/mL 11*   IRON SATURATION % 2*   IRON ug/dL 11*   TIBC ug/dL 452.2*     Results from last 7 days   Lab Units 04/13/25  1451   TRANSFERRIN mg/dL 323     Results from last 7 days   Lab Units 04/14/25  0549   UNIT PRODUCT CODE  [id number]   UNIT NUMBER  [id number]   UNITABO  A   UNITRH  NEG   CROSSMATCH  Compatible   UNIT DISPENSE STATUS  Presumed Trans   UNIT PRODUCT VOL ml 300             Results from last 7 days   Lab Units 04/13/25  0738   CRP mg/L 86.6*             Results from last 7 days   Lab Units 04/13/25  1102   CLARITY UA  Clear   COLOR UA  Light Yellow   SPEC GRAV UA  <1.005*   PH UA  8.0   GLUCOSE UA mg/dl Negative   KETONES UA mg/dl Negative   BLOOD UA  Negative   PROTEIN UA mg/dl Negative   NITRITE UA  Negative   BILIRUBIN UA  Negative   UROBILINOGEN UA (BE) mg/dl <2.0   LEUKOCYTES UA  Negative     Results from last 7 days   Lab Units 04/13/25  1915 04/13/25  0738   STREP PNEUMONIAE ANTIGEN, URINE  Negative  --    LEGIONELLA URINARY ANTIGEN  Negative  --    INFLUENZA A PCR   --  Negative   INFLUENZA B PCR   --  Negative   RSV PCR   --  Negative                             Results from last 7 days   Lab Units 04/13/25  1100   BLOOD CULTURE  Received in Microbiology Lab. Culture in Progress.  Received in Microbiology Lab. Culture in Progress.                   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    Chronic pain     Closed dislocation of tarsometatarsal joint 2/3/2015    Closed fracture of multiple ribs 3/25/2017     Closed fracture of phalanx of foot 3/25/2017    Closed fracture of tarsal and metatarsal bones 3/25/2017    Complex regional pain syndrome type 1 of lower extremity 4/12/2016    Depression     History of pulmonary embolism 3/25/2017    Iron deficiency anemia     Pulmonary embolism (HCC)     Seizures (HCC)     Treatment of healed fracture follow-up examination 3/25/2017     Present on Admission:   Anxiety   Epilepsy (HCC)   Current smoker   Anemia   History of pulmonary embolism      Admitting Diagnosis: Shortness of breath [R06.02]  Anemia [D64.9]  Leg swelling [M79.89]  Multifocal pneumonia [J18.9]  Age/Sex: 49 y.o. female    Network Utilization Review Department  ATTENTION: Please call with any questions or concerns to [phone] and carefully listen to the prompts so that you are directed to the right person. All voicemails are confidential.   For Discharge needs, contact Care Management DC Support Team at [phone] opt. [phone]  Send all requests for admission clinical reviews, approved or denied determinations and any other requests to dedicated fax number below belonging to the [facility] where the patient is receiving treatment. List of dedicated fax numbers for the Facilities:  FACILITY NAME UR FAX NUMBER   ADMISSION DENIALS (Administrative/Medical Necessity) [phone]   DISCHARGE SUPPORT TEAM (NETWORK) [phone]   PARENT CHILD HEALTH (Maternity/NICU/Pediatrics) [phone]   [facility] [phone]   [facility] [phone]   [facility] [phone]   [facility] [phone]   [facility] [phone]   General acute hospital [phone]   [facility] [phone]   [facility] [phone]   [facility] [phone]   [facility]  [phone]   [facility] [phone]   [facility] [phone]
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
## 2025-04-14 NOTE — ASSESSMENT & PLAN NOTE
Patient presented with shortness of breath for several weeks  Patient has history of several PEs in the past  Patient also complaining of bilateral lower extremity edema  CT PE showed no PE however showed Peribronchovascular groundglass and nodular opacities throughout both lungs, likely infectious/inflammatory pneumonia.  Plan  Possible etiology of shortness of breath including pneumonia, chronic PE, heart failure  Venous dopplers negative  Echocardiogram showing LVEF of 60 to 65% normal systolic and diastolic function left atrium mildly dilated

## 2025-04-14 NOTE — PLAN OF CARE
Problem: PAIN - ADULT  Goal: Verbalizes/displays adequate comfort level or baseline comfort level  Description: Interventions:- Encourage patient to monitor pain and request assistance- Assess pain using appropriate pain scale- Administer analgesics based on type and severity of pain and evaluate response- Implement non-pharmacological measures as appropriate and evaluate response- Consider cultural and social influences on pain and pain management- Notify physician/advanced practitioner if interventions unsuccessful or patient reports new pain  Outcome: Progressing     Problem: INFECTION - ADULT  Goal: Absence or prevention of progression during hospitalization  Description: INTERVENTIONS:- Assess and monitor for signs and symptoms of infection- Monitor lab/diagnostic results- Monitor all insertion sites, i.e. indwelling lines, tubes, and drains- Monitor endotracheal if appropriate and nasal secretions for changes in amount and color- [place] appropriate cooling/warming therapies per order- Administer medications as ordered- Instruct and encourage patient and family to use good hand hygiene technique- Identify and instruct in appropriate isolation precautions for identified infection/condition  Outcome: Progressing  Goal: Absence of fever/infection during neutropenic period  Description: INTERVENTIONS:- Monitor WBC  Outcome: Progressing     Problem: SAFETY ADULT  Goal: Maintain or return to baseline ADL function  Description: INTERVENTIONS:-  Assess patient's ability to carry out ADLs; assess patient's baseline for ADL function and identify physical deficits which impact ability to perform ADLs (bathing, care of mouth/teeth, toileting, grooming, dressing, etc.)- Assess/evaluate cause of self-care deficits - Assess range of motion- Assess patient's mobility; develop plan if impaired- Assess patient's need for assistive devices and provide as appropriate- Encourage maximum independence but intervene and supervise  when necessary- Involve family in performance of ADLs- Assess for home care needs following discharge - Consider OT consult to assist with ADL evaluation and planning for discharge- Provide patient education as appropriate  Outcome: Progressing     Problem: DISCHARGE PLANNING  Goal: Discharge to home or other facility with appropriate resources  Description: INTERVENTIONS:- Identify barriers to discharge w/patient and caregiver- Arrange for needed discharge resources and transportation as appropriate- Identify discharge learning needs (meds, wound care, etc.)- Arrange for interpretive services to assist at discharge as needed- Refer to Case Management Department for coordinating discharge planning if the patient needs post-hospital services based on physician/advanced practitioner order or complex needs related to functional status, cognitive ability, or social support system  Outcome: Progressing     Problem: Knowledge Deficit  Goal: Patient/family/caregiver demonstrates understanding of disease process, treatment plan, medications, and discharge instructions  Description: Complete learning assessment and assess knowledge base.Interventions:- Provide teaching at level of understanding- Provide teaching via preferred learning methods  Outcome: Progressing

## 2025-04-14 NOTE — ASSESSMENT & PLAN NOTE
Patient has a history of iron deficient anemia  Patient reported that she was supposed to be starting on iron  Current hemoglobin 6.7  1 unit of packed red blood cells was ordered in the ED  Patient denied any symptoms of lightheadedness.  Patient denied dark stools  Plan  iron panel showing iron deficiency-> iron supplementation   reticulocyte count WNL  Monitor hemoglobin  Consider starting iron if it is low  Patient will benefit from outpatient colonoscopy screening with GI referral

## 2025-04-14 NOTE — UTILIZATION REVIEW
NOTIFICATION OF INPATIENT ADMISSION   AUTHORIZATION REQUEST   SERVICING FACILITY:   [place] [address]  Tax ID: [id number]  NPI: [id number] ATTENDING PROVIDER:  Attending Name and NPI#: Kiki Ye Md [[id number]]  Address: [address] [place] [address]  Phone: [phone]   ADMISSION INFORMATION:  Place of Service: Inpatient [facility]  Place of Service Code: 21  Inpatient Admission Date/Time: 4/13/25 11:33 AM  Discharge Date/Time: No discharge date for patient encounter.  Admitting Diagnosis Code/Description:  Shortness of breath [R06.02]  Anemia [D64.9]  Leg swelling [M79.89]  Multifocal pneumonia [J18.9]     UTILIZATION REVIEW CONTACT:  Liliana Kendrick, Utilization   Network Utilization Review Department  Phone: [phone]  Fax: [phone]  Email: [email or web address]  Contact for approvals/pending authorizations, clinical reviews, and discharge.     PHYSICIAN ADVISORY SERVICES:  Medical Necessity Denial & Xtjy-yy-Ypjt Review  Phone: [phone]  Fax: [phone]  Email: [email or web address]     DISCHARGE SUPPORT TEAM:  For Patients Discharge Needs & Updates  Phone: [phone] opt. [phone] Fax: [phone]  Email: [email or web address]
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
## 2025-04-15 NOTE — UTILIZATION REVIEW
NOTIFICATION OF ADMISSION DISCHARGE   This is a Notification of Discharge from [facility]. Please be advised that this patient has been discharge from our facility. Below you will find the admission and discharge date and time including the patient’s disposition.   UTILIZATION REVIEW CONTACT:  Utilization Review Assistants  Network Utilization Review Department  Phone: [phone] x carefully listen to the prompts. All voicemails are confidential.  Email: [email or web address]     ADMISSION INFORMATION  PRESENTATION DATE: 4/13/2025  6:44 AM  OBERVATION ADMISSION DATE: N/A  INPATIENT ADMISSION DATE: 4/13/25 11:33 AM   DISCHARGE DATE: 4/14/2025  4:34 PM   DISPOSITION:Home/Self Care    Network Utilization Review Department  ATTENTION: Please call with any questions or concerns to [phone] and carefully listen to the prompts so that you are directed to the right person. All voicemails are confidential.   For Discharge needs, contact Care Management DC Support Team at [phone] opt. [phone]  Send all requests for admission clinical reviews, approved or denied determinations and any other requests to dedicated fax number below belonging to the campus where the patient is receiving treatment. List of dedicated fax numbers for the Facilities:  FACILITY NAME UR FAX NUMBER   ADMISSION DENIALS (Administrative/Medical Necessity) [phone]   DISCHARGE SUPPORT TEAM ([facility]) [phone]   PARENT CHILD HEALTH (Maternity/NICU/Pediatrics) [phone]   [facility] [phone]   [facility] [phone]   [facility] [phone]   [facility] [phone]   [facility] [phone]   [facility] [phone]   [facility] [phone]   [facility] [phone]   [facility]  [facility] [phone]   [facility] [phone]   General acute hospital [phone]   [facility] [phone]

## 2025-04-18 LAB
BACTERIA BLD CULT: NORMAL
BACTERIA BLD CULT: NORMAL

## 2025-05-06 DIAGNOSIS — D64.9 ANEMIA: ICD-10-CM

## 2025-05-06 RX ORDER — FERROUS SULFATE 324(65)MG
324 TABLET, DELAYED RELEASE (ENTERIC COATED) ORAL
Qty: 180 TABLET | Refills: 1 | OUTPATIENT
Start: 2025-05-06

## 2025-05-13 PROBLEM — A41.9 SEPSIS (HCC): Status: RESOLVED | Noted: 2025-04-13 | Resolved: 2025-05-13

## 2025-05-13 PROBLEM — J18.9 PNEUMONIA: Status: RESOLVED | Noted: 2025-04-13 | Resolved: 2025-05-13
